# Patient Record
Sex: FEMALE | NOT HISPANIC OR LATINO | Employment: OTHER | URBAN - NONMETROPOLITAN AREA
[De-identification: names, ages, dates, MRNs, and addresses within clinical notes are randomized per-mention and may not be internally consistent; named-entity substitution may affect disease eponyms.]

---

## 2022-02-23 ENCOUNTER — HOSPITAL ENCOUNTER (EMERGENCY)
Facility: HOSPITAL | Age: 87
Discharge: HOME | End: 2022-02-24
Attending: EMERGENCY MEDICINE | Admitting: EMERGENCY MEDICINE
Payer: COMMERCIAL

## 2022-02-23 ENCOUNTER — APPOINTMENT (EMERGENCY)
Dept: CT IMAGING | Facility: HOSPITAL | Age: 87
End: 2022-02-23
Payer: COMMERCIAL

## 2022-02-23 ENCOUNTER — APPOINTMENT (EMERGENCY)
Dept: RADIOLOGY | Facility: HOSPITAL | Age: 87
End: 2022-02-23
Payer: COMMERCIAL

## 2022-02-23 DIAGNOSIS — R19.7 NAUSEA VOMITING AND DIARRHEA: ICD-10-CM

## 2022-02-23 DIAGNOSIS — R11.2 NAUSEA VOMITING AND DIARRHEA: ICD-10-CM

## 2022-02-23 DIAGNOSIS — J98.01 BRONCHOSPASM: Primary | ICD-10-CM

## 2022-02-23 DIAGNOSIS — J18.9 PNEUMONIA: ICD-10-CM

## 2022-02-23 LAB
2HR DELTA HS TROPONIN: 5 NG/L
ALBUMIN SERPL BCP-MCNC: 3.5 G/DL (ref 3.5–5)
ALP SERPL-CCNC: 86 U/L (ref 46–116)
ALT SERPL W P-5'-P-CCNC: 12 U/L (ref 12–78)
ANION GAP SERPL CALCULATED.3IONS-SCNC: 11 MMOL/L (ref 4–13)
APTT PPP: 28 SECONDS (ref 23–37)
AST SERPL W P-5'-P-CCNC: 16 U/L (ref 5–45)
BASE EX.OXY STD BLDV CALC-SCNC: 56.5 % (ref 60–80)
BASE EXCESS BLDV CALC-SCNC: -4.7 MMOL/L
BASOPHILS # BLD AUTO: 0.04 THOUSANDS/ΜL (ref 0–0.1)
BASOPHILS NFR BLD AUTO: 1 % (ref 0–1)
BILIRUB SERPL-MCNC: 0.64 MG/DL (ref 0.2–1)
BUN SERPL-MCNC: 43 MG/DL (ref 5–25)
CALCIUM SERPL-MCNC: 8.7 MG/DL (ref 8.3–10.1)
CARDIAC TROPONIN I PNL SERPL HS: 17 NG/L
CARDIAC TROPONIN I PNL SERPL HS: 22 NG/L
CHLORIDE SERPL-SCNC: 106 MMOL/L (ref 100–108)
CO2 SERPL-SCNC: 22 MMOL/L (ref 21–32)
CREAT SERPL-MCNC: 1.44 MG/DL (ref 0.6–1.3)
EOSINOPHIL # BLD AUTO: 0.85 THOUSAND/ΜL (ref 0–0.61)
EOSINOPHIL NFR BLD AUTO: 10 % (ref 0–6)
ERYTHROCYTE [DISTWIDTH] IN BLOOD BY AUTOMATED COUNT: 13.2 % (ref 11.6–15.1)
FLUAV RNA RESP QL NAA+PROBE: NEGATIVE
FLUBV RNA RESP QL NAA+PROBE: NEGATIVE
GFR SERPL CREATININE-BSD FRML MDRD: 33 ML/MIN/1.73SQ M
GLUCOSE SERPL-MCNC: 114 MG/DL (ref 65–140)
HCO3 BLDV-SCNC: 20.6 MMOL/L (ref 24–30)
HCT VFR BLD AUTO: 39.3 % (ref 34.8–46.1)
HGB BLD-MCNC: 13.4 G/DL (ref 11.5–15.4)
IMM GRANULOCYTES # BLD AUTO: 0.04 THOUSAND/UL (ref 0–0.2)
IMM GRANULOCYTES NFR BLD AUTO: 1 % (ref 0–2)
INR PPP: 1.18 (ref 0.84–1.19)
LACTATE SERPL-SCNC: 1.6 MMOL/L (ref 0.5–2)
LYMPHOCYTES # BLD AUTO: 1.79 THOUSANDS/ΜL (ref 0.6–4.47)
LYMPHOCYTES NFR BLD AUTO: 21 % (ref 14–44)
MAGNESIUM SERPL-MCNC: 2.1 MG/DL (ref 1.6–2.6)
MCH RBC QN AUTO: 33.9 PG (ref 26.8–34.3)
MCHC RBC AUTO-ENTMCNC: 34.1 G/DL (ref 31.4–37.4)
MCV RBC AUTO: 100 FL (ref 82–98)
MONOCYTES # BLD AUTO: 0.71 THOUSAND/ΜL (ref 0.17–1.22)
MONOCYTES NFR BLD AUTO: 8 % (ref 4–12)
NEUTROPHILS # BLD AUTO: 5.11 THOUSANDS/ΜL (ref 1.85–7.62)
NEUTS SEG NFR BLD AUTO: 59 % (ref 43–75)
NRBC BLD AUTO-RTO: 0 /100 WBCS
NT-PROBNP SERPL-MCNC: 501 PG/ML
O2 CT BLDV-SCNC: 11.2 ML/DL
PCO2 BLDV: 38.9 MM HG (ref 42–50)
PH BLDV: 7.34 [PH] (ref 7.3–7.4)
PLATELET # BLD AUTO: 196 THOUSANDS/UL (ref 149–390)
PMV BLD AUTO: 9.6 FL (ref 8.9–12.7)
PO2 BLDV: 29.7 MM HG (ref 35–45)
POTASSIUM SERPL-SCNC: 4.3 MMOL/L (ref 3.5–5.3)
PROT SERPL-MCNC: 7.3 G/DL (ref 6.4–8.2)
PROTHROMBIN TIME: 14.9 SECONDS (ref 11.6–14.5)
RBC # BLD AUTO: 3.95 MILLION/UL (ref 3.81–5.12)
RSV RNA RESP QL NAA+PROBE: NEGATIVE
SARS-COV-2 RNA RESP QL NAA+PROBE: NEGATIVE
SODIUM SERPL-SCNC: 139 MMOL/L (ref 136–145)
WBC # BLD AUTO: 8.54 THOUSAND/UL (ref 4.31–10.16)

## 2022-02-23 PROCEDURE — 85610 PROTHROMBIN TIME: CPT | Performed by: PHYSICIAN ASSISTANT

## 2022-02-23 PROCEDURE — 99285 EMERGENCY DEPT VISIT HI MDM: CPT

## 2022-02-23 PROCEDURE — 82805 BLOOD GASES W/O2 SATURATION: CPT | Performed by: PHYSICIAN ASSISTANT

## 2022-02-23 PROCEDURE — 0241U HB NFCT DS VIR RESP RNA 4 TRGT: CPT | Performed by: PHYSICIAN ASSISTANT

## 2022-02-23 PROCEDURE — 99284 EMERGENCY DEPT VISIT MOD MDM: CPT | Performed by: PHYSICIAN ASSISTANT

## 2022-02-23 PROCEDURE — 84484 ASSAY OF TROPONIN QUANT: CPT | Performed by: PHYSICIAN ASSISTANT

## 2022-02-23 PROCEDURE — 96360 HYDRATION IV INFUSION INIT: CPT

## 2022-02-23 PROCEDURE — 83880 ASSAY OF NATRIURETIC PEPTIDE: CPT | Performed by: PHYSICIAN ASSISTANT

## 2022-02-23 PROCEDURE — 71260 CT THORAX DX C+: CPT

## 2022-02-23 PROCEDURE — 83735 ASSAY OF MAGNESIUM: CPT | Performed by: PHYSICIAN ASSISTANT

## 2022-02-23 PROCEDURE — 71045 X-RAY EXAM CHEST 1 VIEW: CPT

## 2022-02-23 PROCEDURE — 93005 ELECTROCARDIOGRAM TRACING: CPT

## 2022-02-23 PROCEDURE — 83605 ASSAY OF LACTIC ACID: CPT | Performed by: PHYSICIAN ASSISTANT

## 2022-02-23 PROCEDURE — 87040 BLOOD CULTURE FOR BACTERIA: CPT | Performed by: PHYSICIAN ASSISTANT

## 2022-02-23 PROCEDURE — 80053 COMPREHEN METABOLIC PANEL: CPT | Performed by: PHYSICIAN ASSISTANT

## 2022-02-23 PROCEDURE — 85025 COMPLETE CBC W/AUTO DIFF WBC: CPT | Performed by: PHYSICIAN ASSISTANT

## 2022-02-23 PROCEDURE — 85730 THROMBOPLASTIN TIME PARTIAL: CPT | Performed by: PHYSICIAN ASSISTANT

## 2022-02-23 PROCEDURE — 36415 COLL VENOUS BLD VENIPUNCTURE: CPT | Performed by: PHYSICIAN ASSISTANT

## 2022-02-23 RX ORDER — ALBUTEROL SULFATE 90 UG/1
2 AEROSOL, METERED RESPIRATORY (INHALATION) EVERY 6 HOURS PRN
Qty: 8.5 G | Refills: 0 | Status: SHIPPED | OUTPATIENT
Start: 2022-02-23

## 2022-02-23 RX ORDER — DOXYCYCLINE HYCLATE 100 MG/1
100 CAPSULE ORAL EVERY 12 HOURS SCHEDULED
Qty: 14 CAPSULE | Refills: 0 | Status: SHIPPED | OUTPATIENT
Start: 2022-02-23 | End: 2022-03-02

## 2022-02-23 RX ORDER — LEVOFLOXACIN 5 MG/ML
750 INJECTION, SOLUTION INTRAVENOUS ONCE
Status: DISCONTINUED | OUTPATIENT
Start: 2022-02-23 | End: 2022-02-23

## 2022-02-23 RX ORDER — ONDANSETRON 4 MG/1
4 TABLET, ORALLY DISINTEGRATING ORAL EVERY 6 HOURS PRN
Qty: 20 TABLET | Refills: 0 | Status: SHIPPED | OUTPATIENT
Start: 2022-02-23

## 2022-02-23 RX ORDER — DOXYCYCLINE HYCLATE 100 MG/1
100 CAPSULE ORAL ONCE
Status: COMPLETED | OUTPATIENT
Start: 2022-02-23 | End: 2022-02-23

## 2022-02-23 RX ORDER — ALBUTEROL SULFATE 90 UG/1
2 AEROSOL, METERED RESPIRATORY (INHALATION) ONCE
Status: COMPLETED | OUTPATIENT
Start: 2022-02-23 | End: 2022-02-23

## 2022-02-23 RX ADMIN — DOXYCYCLINE 100 MG: 100 CAPSULE ORAL at 19:33

## 2022-02-23 RX ADMIN — SODIUM CHLORIDE 500 ML: 0.9 INJECTION, SOLUTION INTRAVENOUS at 18:52

## 2022-02-23 RX ADMIN — IOHEXOL 85 ML: 350 INJECTION, SOLUTION INTRAVENOUS at 18:29

## 2022-02-23 RX ADMIN — ALBUTEROL SULFATE 2 PUFF: 90 AEROSOL, METERED RESPIRATORY (INHALATION) at 19:36

## 2022-02-23 NOTE — ED PROVIDER NOTES
History  Chief Complaint   Patient presents with    Shortness of Breath     EMS found her saturation in the 70-80's stated possible aspiration because got short of breath after drinking some fluids  The patient is an 72-year-old female who presents emergency department today via EMS from her assisted living facility [facility] for the concern of shortness breath, cough, N/V/D  The patient states that last evening she "did not feel well " She had decreased appeptite and diarrhea  She began around 4 am having nausea and vomiting  She states that she had probably about 4 episodes of diarrhea, and 2-3 episodes of emesis last night into today  She states that these both ressolved  She states that she began having a dry persistant cough today, and at [facility], during a coughing bout, she had diaphoresis, paleness,SpO2 of 75% on RA  The aptient does not wear oxygen at home  The patient does admit to having the COVID vaccinations and booster vaccine  Patient denies any shortness of breath, chest pain, current nausea or vomiting or diarrhea, abdominal pain  Patient states that yesterday she did feel so she may have had a fever and chills but temperature was 97 9 per nursing staff  Per EMS staff when they arrived the patient was having a severe coughing bout the portable pulse ox was 75% on room air he she was placed on oxygen in route here     Patient takes elliquis due to history of PE      History provided by:  Patient and EMS personnel  Shortness of Breath  Severity:  Moderate  Onset quality:  Unable to specify  Timing:  Constant  Progression:  Resolved  Chronicity:  New  Context: URI    Relieved by:  Oxygen and rest  Worsened by:  Coughing  Ineffective treatments:  Rest  Associated symptoms: cough and vomiting    Associated symptoms: no abdominal pain, no chest pain, no ear pain, no fever, no rash and no sore throat    Cough:     Cough characteristics:  Non-productive    Sputum characteristics:  Nondescript Severity:  Moderate    Onset quality:  Gradual    Timing:  Constant    Progression:  Unchanged    Chronicity:  New  Vomiting:     Quality:  Undigested food and stomach contents    Severity:  Mild    Progression:  Resolved  Risk factors: obesity    Risk factors: no tobacco use        None       Past Medical History:   Diagnosis Date    Chronic pulmonary embolism (HCC)     Edema     Hyperlipidemia     Hypertension        History reviewed  No pertinent surgical history  History reviewed  No pertinent family history  I have reviewed and agree with the history as documented  E-Cigarette/Vaping     E-Cigarette/Vaping Substances     Social History     Tobacco Use    Smoking status: Former Smoker    Smokeless tobacco: Never Used   Substance Use Topics    Alcohol use: Never    Drug use: Never       Review of Systems   Constitutional: Negative for chills and fever  HENT: Negative for ear pain and sore throat  Eyes: Negative for pain and visual disturbance  Respiratory: Positive for cough and shortness of breath  Cardiovascular: Negative for chest pain and palpitations  Gastrointestinal: Positive for vomiting  Negative for abdominal pain  Genitourinary: Negative for dysuria and hematuria  Musculoskeletal: Negative for arthralgias and back pain  Skin: Negative for color change and rash  Neurological: Negative for seizures and syncope  All other systems reviewed and are negative  Physical Exam  Physical Exam  Vitals and nursing note reviewed  Constitutional:       General: She is not in acute distress  Appearance: She is well-developed  HENT:      Head: Normocephalic and atraumatic  Eyes:      Extraocular Movements: Extraocular movements intact  Conjunctiva/sclera: Conjunctivae normal       Pupils: Pupils are equal, round, and reactive to light  Cardiovascular:      Rate and Rhythm: Normal rate and regular rhythm  Heart sounds: No murmur heard        Pulmonary: Effort: Pulmonary effort is normal  No respiratory distress  Breath sounds: Rhonchi and rales present  Chest:      Chest wall: No tenderness  Abdominal:      Palpations: Abdomen is soft  Tenderness: There is no abdominal tenderness  Musculoskeletal:      Cervical back: Neck supple  Right lower leg: Edema present  Left lower leg: Edema present  Skin:     General: Skin is warm and dry  Capillary Refill: Capillary refill takes less than 2 seconds  Neurological:      General: No focal deficit present  Mental Status: She is alert and oriented to person, place, and time  Vital Signs  ED Triage Vitals [02/23/22 1722]   Temperature Pulse Respirations Blood Pressure SpO2   97 6 °F (36 4 °C) 72 22 139/63 95 %      Temp Source Heart Rate Source Patient Position - Orthostatic VS BP Location FiO2 (%)   Temporal Monitor Lying Right arm --      Pain Score       No Pain           Vitals:    02/23/22 1722 02/23/22 1810   BP: 139/63 103/57   Pulse: 72 68   Patient Position - Orthostatic VS: Lying          Visual Acuity      ED Medications  Medications   sodium chloride 0 9 % bolus 500 mL (0 mL Intravenous Stopped 2/23/22 1934)   iohexol (OMNIPAQUE) 350 MG/ML injection (SINGLE-DOSE) 85 mL (85 mL Intravenous Given 2/23/22 1829)   doxycycline hyclate (VIBRAMYCIN) capsule 100 mg (100 mg Oral Given 2/23/22 1933)   albuterol (PROVENTIL HFA,VENTOLIN HFA) inhaler 2 puff (2 puffs Inhalation Given 2/23/22 1936)       Diagnostic Studies  Results Reviewed     Procedure Component Value Units Date/Time    HS Troponin I 2hr [[id number]]  (Normal) Collected: 02/23/22 1901    Lab Status: Final result Specimen: Blood from Arm, Left Updated: 02/23/22 1928     hs TnI 2hr 22 ng/L      Delta 2hr hsTnI 5 ng/L     Blood culture #1 [[id number]] Collected: 02/23/22 1901    Lab Status:  In process Specimen: Blood from Arm, Left Updated: 02/23/22 1904    COVID/FLU/RSV - 2 hour TAT [[id number]]  (Normal) Collected: 02/23/22 1730    Lab Status: Final result Specimen: Nares from Nasopharyngeal Swab Updated: 02/23/22 1816     SARS-CoV-2 Negative     INFLUENZA A PCR Negative     INFLUENZA B PCR Negative     RSV PCR Negative    Narrative:      FOR PEDIATRIC PATIENTS - copy/paste COVID Guidelines URL to browser: [email or web address]    SARS-CoV-2 assay is a Nucleic Acid Amplification assay intended for the  qualitative detection of nucleic acid from SARS-CoV-2 in nasopharyngeal  swabs  Results are for the presumptive identification of SARS-CoV-2 RNA  Positive results are indicative of infection with SARS-CoV-2, the virus  causing COVID-19, but do not rule out bacterial infection or co-infection  with other viruses  Laboratories within the [place] and its  territories are required to report all positive results to the appropriate  public health authorities  Negative results do not preclude SARS-CoV-2  infection and should not be used as the sole basis for treatment or other  patient management decisions  Negative results must be combined with  clinical observations, patient history, and epidemiological information  This test has not been FDA cleared or approved  This test has been authorized by FDA under an Emergency Use Authorization  (EUA)  This test is only authorized for the duration of time the  declaration that circumstances exist justifying the authorization of the  emergency use of an in vitro diagnostic tests for detection of SARS-CoV-2  virus and/or diagnosis of COVID-19 infection under section 564(b)(1) of  the Act, 21 U  S C  185XTR-2(J)(3), unless the authorization is terminated  or revoked sooner  The test has been validated but independent review by FDA  and CLIA is pending  Test performed using [employer] Gene[employer]pert: This RT-PCR assay targets N2,  a region unique to SARS-CoV-2   A conserved region in the E-gene was chosen  for pan-Sarbecovirus detection which includes SARS-CoV-2  HS Troponin 0hr (reflex protocol) [[id number]]  (Normal) Collected: 02/23/22 1730    Lab Status: Final result Specimen: Blood from Arm, Left Updated: 02/23/22 1804     hs TnI 0hr 17 ng/L     Lactic acid [[id number]]  (Normal) Collected: 02/23/22 1730    Lab Status: Final result Specimen: Blood from Arm, Left Updated: 02/23/22 1804     LACTIC ACID 1 6 mmol/L     Narrative:      Result may be elevated if tourniquet was used during collection      Comprehensive metabolic panel [[id number]]  (Abnormal) Collected: 02/23/22 1730    Lab Status: Final result Specimen: Blood from Arm, Left Updated: 02/23/22 1801     Sodium 139 mmol/L      Potassium 4 3 mmol/L      Chloride 106 mmol/L      CO2 22 mmol/L      ANION GAP 11 mmol/L      BUN 43 mg/dL      Creatinine 1 44 mg/dL      Glucose 114 mg/dL      Calcium 8 7 mg/dL      AST 16 U/L      ALT 12 U/L      Alkaline Phosphatase 86 U/L      Total Protein 7 3 g/dL      Albumin 3 5 g/dL      Total Bilirubin 0 64 mg/dL      eGFR 33 ml/min/1 73sq m     Narrative:      [place] guidelines for Chronic Kidney Disease (CKD):     Stage 1 with normal or high GFR (GFR > 90 mL/min/1 73 square meters)    Stage 2 Mild CKD (GFR = 60-89 mL/min/1 73 square meters)    Stage 3A Moderate CKD (GFR = 45-59 mL/min/1 73 square meters)    Stage 3B Moderate CKD (GFR = 30-44 mL/min/1 73 square meters)    Stage 4 Severe CKD (GFR = 15-29 mL/min/1 73 square meters)    Stage 5 End Stage CKD (GFR <15 mL/min/1 73 square meters)  Note: GFR calculation is accurate only with a steady state creatinine    Magnesium [[id number]]  (Normal) Collected: 02/23/22 1730    Lab Status: Final result Specimen: Blood from Arm, Left Updated: 02/23/22 1801     Magnesium 2 1 mg/dL     NT-BNP PRO [[id number]]  (Abnormal) Collected: 02/23/22 1730    Lab Status: Final result Specimen: Blood from Arm, Left Updated: 02/23/22 1801     NT-proBNP 501 pg/mL     APTT [[id number]] (Normal) Collected: 02/23/22 1729    Lab Status: Final result Specimen: Blood from Arm, Left Updated: 02/23/22 1751     PTT 28 seconds     Protime-INR [[id number]]  (Abnormal) Collected: 02/23/22 1729    Lab Status: Final result Specimen: Blood from Arm, Left Updated: 02/23/22 1751     Protime 14 9 seconds      INR 1 18    Blood gas, venous [[id number]]  (Abnormal) Collected: 02/23/22 1729    Lab Status: Final result Specimen: Blood from Arm, Left Updated: 02/23/22 1738     pH, Abraham 7 341     pCO2, Abraham 38 9 mm Hg      pO2, Abraham 29 7 mm Hg      HCO3, Abraham 20 6 mmol/L      Base Excess, Abraham -4 7 mmol/L      O2 Content, Abraham 11 2 ml/dL      O2 HGB, VENOUS 56 5 %     CBC and differential [[id number]]  (Abnormal) Collected: 02/23/22 1730    Lab Status: Final result Specimen: Blood from Arm, Left Updated: 02/23/22 1737     WBC 8 54 Thousand/uL      RBC 3 95 Million/uL      Hemoglobin 13 4 g/dL      Hematocrit 39 3 %       fL      MCH 33 9 pg      MCHC 34 1 g/dL      RDW 13 2 %      MPV 9 6 fL      Platelets 617 Thousands/uL      nRBC 0 /100 WBCs      Neutrophils Relative 59 %      Immat GRANS % 1 %      Lymphocytes Relative 21 %      Monocytes Relative 8 %      Eosinophils Relative 10 %      Basophils Relative 1 %      Neutrophils Absolute 5 11 Thousands/µL      Immature Grans Absolute 0 04 Thousand/uL      Lymphocytes Absolute 1 79 Thousands/µL      Monocytes Absolute 0 71 Thousand/µL      Eosinophils Absolute 0 85 Thousand/µL      Basophils Absolute 0 04 Thousands/µL     Blood culture #2 [[id number]] Collected: 02/23/22 1730    Lab Status: In process Specimen: Blood from Arm, Left Updated: 02/23/22 1736                 CT chest with contrast   Final Result by Jose Mckenna MD (02/23 1851)      Patchy and linear opacities in bilateral upper (left greater than right) and left lower lobes  Findings may represent infection, inflammation, or aspiration  Consider follow-up chest CT in 8-12 weeks        The study was marked in EPIC for immediate notification  Workstation performed: [id number]         XR chest 1 view portable   Final Result by Karla Keller MD (02/23 1853)      Patchy linear opacities scattered throughout left lung  Differential includes infection, inflammation, or aspiration  Workstation performed: [id number]                    Procedures  ECG 12 Lead Documentation Only    Date/Time: 2/23/2022 6:11 PM  Performed by: Emilio Crum PA-C  Authorized by: Emilio Crum PA-C     Indications / Diagnosis:  Nausea  Patient location:  ED  Previous ECG:     Previous ECG:  Unavailable  Interpretation:     Interpretation: non-specific    Rate:     ECG rate:  71    ECG rate assessment: normal    Rhythm:     Rhythm: sinus rhythm    Conduction:     Conduction: abnormal      Abnormal conduction: complete RBBB               ED Course  ED Course as of 02/23/22 2306   Wed Feb 23, 2022   1802 The patient upon arrival is 95% on room air with a good wave form, will continue to monitor   1806 hs TnI 0hr: 17   1829 SARS-COV-2: Negative   1904 Patchy and linear opacities in bilateral upper (left greater than right) and left lower lobes  Findings may represent infection, inflammation, or aspiration  Consider follow-up chest CT in 8-12 weeks      The study was marked in EPIC for immediate notification  1928 Delta 2hr hsTnI: 5                               SBIRT 22yo+      Most Recent Value   SBIRT (22 yo +)    In order to provide better care to our patients, we are screening all of our patients for alcohol and drug use  Would it be okay to ask you these screening questions? Yes Filed at: 02/23/2022 1851   Initial Alcohol Screen: US AUDIT-C     1  How often do you have a drink containing alcohol? 0 Filed at: 02/23/2022 1851   2  How many drinks containing alcohol do you have on a typical day you are drinking? 0 Filed at: 02/23/2022 1851   3a  Male UNDER 65:  How often do you have five or more drinks on one occasion? 0 Filed at: 02/23/2022 1851   3b  FEMALE Any Age, or MALE 65+: How often do you have 4 or more drinks on one occassion? 0 Filed at: 02/23/2022 1851   Audit-C Score 0 Filed at: 02/23/2022 1851   DEMETRIS: How many times in the past year have you    Used an illegal drug or used a prescription medication for non-medical reasons? Never Filed at: 02/23/2022 1851                    MDM  Number of Diagnoses or Management Options  Bronchospasm  Nausea vomiting and diarrhea  Pneumonia  Diagnosis management comments: The patient upon arrival did not require any supplemental oxygen and remained 96%-98% on room air throughout her entire ER evaluation  Patient did not have any fever any leukocytosis or lactic acidosis  Patient on CT scan illustrated inflammation versus infection in the upper lobes  Patient however was on room air and doing well and feeling well  Patient's condition earlier with her abnormal pulse ox could be due to an inaccurate read secondary to bronchospasm or movement, this could been due to an acute bronchospasm which resolved  Patient will be empirically started on antibiotics  Discussed case with the patient at this time, she feels well would like to return back to the assisted living facility to complete outpatient antibiotics and treatment will return if anything worsens         Amount and/or Complexity of Data Reviewed  Clinical lab tests: ordered and reviewed  Tests in the radiology section of CPT®: ordered and reviewed  Decide to obtain previous medical records or to obtain history from someone other than the patient: yes  Review and summarize past medical records: yes  Independent visualization of images, tracings, or specimens: yes    Risk of Complications, Morbidity, and/or Mortality  Presenting problems: moderate  Diagnostic procedures: moderate  Management options: moderate    Patient Progress  Patient progress: stable      Disposition  Final diagnoses: Bronchospasm   Nausea vomiting and diarrhea   Pneumonia     Time reflects when diagnosis was documented in both MDM as applicable and the Disposition within this note     Time User Action Codes Description Comment    2/23/2022  7:31 PM Fraser Lundborg Add [J98 01] Bronchospasm     2/23/2022  7:31 PM Fraser Lundborg Add [R11 2,  R19 7] Nausea vomiting and diarrhea     2/23/2022  7:32 PM Bry Izquierdog Add [J18 9] Pneumonia       ED Disposition     ED Disposition Condition Date/Time Comment    Discharge Stable Wed Feb 23, 2022  7:32 PM Vandana Payor discharge to home/self care  Follow-up Information    None         Patient's Medications   Discharge Prescriptions    ALBUTEROL (PROAIR HFA) 90 MCG/ACT INHALER    Inhale 2 puffs every 6 (six) hours as needed for wheezing       Start Date: 2/23/2022 End Date: --       Order Dose: 2 puffs       Quantity: 8 5 g    Refills: 0    DOXYCYCLINE HYCLATE (VIBRAMYCIN) 100 MG CAPSULE    Take 1 capsule (100 mg total) by mouth every 12 (twelve) hours for 7 days       Start Date: 2/23/2022 End Date: 3/2/2022       Order Dose: 100 mg       Quantity: 14 capsule    Refills: 0    ONDANSETRON (ZOFRAN ODT) 4 MG DISINTEGRATING TABLET    Take 1 tablet (4 mg total) by mouth every 6 (six) hours as needed for nausea or vomiting       Start Date: 2/23/2022 End Date: --       Order Dose: 4 mg       Quantity: 20 tablet    Refills: 0       No discharge procedures on file      PDMP Review     None          ED Provider  Electronically Signed by           Gerry Peterson PA-C  02/23/22 1808

## 2022-02-24 VITALS
WEIGHT: 224.87 LBS | OXYGEN SATURATION: 93 % | DIASTOLIC BLOOD PRESSURE: 65 MMHG | RESPIRATION RATE: 18 BRPM | SYSTOLIC BLOOD PRESSURE: 152 MMHG | TEMPERATURE: 97.6 F | HEART RATE: 67 BPM

## 2022-02-24 LAB
ATRIAL RATE: 71 BPM
P AXIS: 54 DEGREES
PR INTERVAL: 164 MS
QRS AXIS: -2 DEGREES
QRSD INTERVAL: 118 MS
QT INTERVAL: 420 MS
QTC INTERVAL: 456 MS
T WAVE AXIS: 22 DEGREES
VENTRICULAR RATE: 71 BPM

## 2022-02-24 RX ORDER — BACITRACIN 500 [USP'U]/G
OINTMENT TOPICAL 2 TIMES DAILY
COMMUNITY

## 2022-02-24 RX ORDER — OXYBUTYNIN CHLORIDE 10 MG/1
10 TABLET, EXTENDED RELEASE ORAL
COMMUNITY

## 2022-02-24 RX ORDER — FUROSEMIDE 40 MG/1
40 TABLET ORAL DAILY PRN
COMMUNITY

## 2022-02-24 RX ORDER — PRAVASTATIN SODIUM 40 MG
40 TABLET ORAL DAILY
COMMUNITY

## 2022-02-24 RX ORDER — LISINOPRIL 20 MG/1
20 TABLET ORAL DAILY
COMMUNITY

## 2022-02-24 NOTE — ED NOTES
Pt 1 assist to bedside toilet  Pt 2 assist back into bed  Pt given ice water        Lore Gomez RN  02/23/22 5985

## 2022-02-24 NOTE — ED NOTES
[facility] to transport patient back to [address] at 1200       Girish Garrett RN  02/24/22 5627

## 2022-02-24 NOTE — ED ATTENDING ATTESTATION
2/23/2022  Gallo Guzman DO, saw and evaluated the patient  I have discussed the patient with the resident/non-physician practitioner and agree with the resident's/non-physician practitioner's findings, Plan of Care, and MDM as documented in the resident's/non-physician practitioner's note, except where noted  All available labs and Radiology studies were reviewed  I was present for key portions of any procedure(s) performed by the resident/non-physician practitioner and I was immediately available to provide assistance  At this point I agree with the current assessment done in the Emergency Department  I have conducted an independent evaluation of this patient a history and physical     ED Course  ED Course as of 02/23/22 2125 Wed Feb 23, 2022   1740 EKG 5:31 p m   Normal sinus rhythm rate 71 normal axis intraventricular conduction delay T-wave inversion V1 lead 3 no ST elevation or depression interpreted by me

## 2022-02-24 NOTE — DISCHARGE INSTRUCTIONS
On our CT of your chest you were found to have some " Patchy and linear opacities in bilateral upper (left greater than right) and left lower lobes  Findings may represent infection, inflammation, or aspiration  Consider follow-up chest CT in 8-12 weeks "    We have started you on antibiotic if anything were to worsen please return  It also started inhaler please use 1-2 puffs every 4-6 hours as needed for cough or shortness of breath  You may have had a bronchospasm that resolved, you did not need any supplemental oxygen in the emergency department

## 2022-02-24 NOTE — ED NOTES
[facility] contacted again, state no BLS transportation available tonight, will try again on day shift        Daron Vera RN  02/24/22 6621

## 2022-03-01 LAB
BACTERIA BLD CULT: NORMAL
BACTERIA BLD CULT: NORMAL

## 2022-08-31 ENCOUNTER — HOSPITAL ENCOUNTER (EMERGENCY)
Facility: HOSPITAL | Age: 87
Discharge: HOME | End: 2022-09-01
Attending: STUDENT IN AN ORGANIZED HEALTH CARE EDUCATION/TRAINING PROGRAM
Payer: COMMERCIAL

## 2022-08-31 DIAGNOSIS — R89.9 ABNORMAL LABORATORY TEST: Primary | ICD-10-CM

## 2022-08-31 LAB
ANION GAP SERPL CALCULATED.3IONS-SCNC: 9 MMOL/L (ref 4–13)
ATRIAL RATE: 71 BPM
BUN SERPL-MCNC: 34 MG/DL (ref 5–25)
CALCIUM SERPL-MCNC: 8.4 MG/DL (ref 8.3–10.1)
CHLORIDE SERPL-SCNC: 105 MMOL/L (ref 96–108)
CO2 SERPL-SCNC: 23 MMOL/L (ref 21–32)
CREAT SERPL-MCNC: 1.44 MG/DL (ref 0.6–1.3)
GFR SERPL CREATININE-BSD FRML MDRD: 33 ML/MIN/1.73SQ M
GLUCOSE SERPL-MCNC: 117 MG/DL (ref 65–140)
MAGNESIUM SERPL-MCNC: 2.1 MG/DL (ref 1.6–2.6)
P AXIS: 17 DEGREES
POTASSIUM SERPL-SCNC: 4.7 MMOL/L (ref 3.5–5.3)
PR INTERVAL: 150 MS
QRS AXIS: -35 DEGREES
QRSD INTERVAL: 122 MS
QT INTERVAL: 416 MS
QTC INTERVAL: 452 MS
SODIUM SERPL-SCNC: 137 MMOL/L (ref 135–147)
T WAVE AXIS: 14 DEGREES
VENTRICULAR RATE: 71 BPM

## 2022-08-31 PROCEDURE — 93005 ELECTROCARDIOGRAM TRACING: CPT

## 2022-08-31 PROCEDURE — 83735 ASSAY OF MAGNESIUM: CPT | Performed by: STUDENT IN AN ORGANIZED HEALTH CARE EDUCATION/TRAINING PROGRAM

## 2022-08-31 PROCEDURE — 99284 EMERGENCY DEPT VISIT MOD MDM: CPT

## 2022-08-31 PROCEDURE — 80048 BASIC METABOLIC PNL TOTAL CA: CPT | Performed by: STUDENT IN AN ORGANIZED HEALTH CARE EDUCATION/TRAINING PROGRAM

## 2022-08-31 PROCEDURE — 36415 COLL VENOUS BLD VENIPUNCTURE: CPT | Performed by: STUDENT IN AN ORGANIZED HEALTH CARE EDUCATION/TRAINING PROGRAM

## 2022-08-31 PROCEDURE — 99282 EMERGENCY DEPT VISIT SF MDM: CPT | Performed by: STUDENT IN AN ORGANIZED HEALTH CARE EDUCATION/TRAINING PROGRAM

## 2022-09-01 VITALS
WEIGHT: 235.01 LBS | TEMPERATURE: 98 F | RESPIRATION RATE: 20 BRPM | HEART RATE: 68 BPM | OXYGEN SATURATION: 98 % | HEIGHT: 65 IN | BODY MASS INDEX: 39.16 KG/M2 | DIASTOLIC BLOOD PRESSURE: 62 MMHG | SYSTOLIC BLOOD PRESSURE: 110 MMHG

## 2022-09-01 NOTE — ED NOTES
[facility] called for transportation, will call back with transport information  Patient made aware and advised transport could take some time  Patient advised they would be placed in inpatient bed if they needed to stay over night waiting for ride  Patient agreeable        Trista Rodriguez RN  08/31/22 3506

## 2022-09-01 NOTE — DISCHARGE INSTRUCTIONS
Continue all prescribed medications  Follow up with the PCP  Do not hesitate to return to the ED for any concerning signs or symptoms

## 2022-09-01 NOTE — ED PROVIDER NOTES
History  Chief Complaint   Patient presents with    Abnormal Lab     Pt sent from [facility] AND  [facility] [place]  Hx hyperkalemia  K 6 1 on OP lab draw today  HPI     82year old F  Presents to the ED with hyperkalemia  OP labs obtained today was significant for a K 6 1  Has been eating and drinking well over the past few days  Voiding normally  Prescribed PO Lasix 40 mg prn for weight gain  Denies chest pain and SOB  Prior to Admission Medications   Prescriptions Last Dose Informant Patient Reported? Taking? Pseudoephedrine-guaiFENesin (GUAIFENESIN/PSE SA PO)   Yes No   Sig: Take 5 mL by mouth every 6 (six) hours as needed   albuterol (ProAir HFA) 90 mcg/act inhaler   No No   Sig: Inhale 2 puffs every 6 (six) hours as needed for wheezing   apixaban (Eliquis) 2 5 mg   Yes No   Sig: Take by mouth 2 (two) times a day   furosemide (LASIX) 40 mg tablet   Yes No   Sig: Take 40 mg by mouth daily as needed   lisinopril (ZESTRIL) 20 mg tablet   Yes No   Sig: Take 20 mg by mouth daily   ondansetron (Zofran ODT) 4 mg disintegrating tablet   No No   Sig: Take 1 tablet (4 mg total) by mouth every 6 (six) hours as needed for nausea or vomiting   oxybutynin (DITROPAN-XL) 10 MG 24 hr tablet   Yes No   Sig: Take 10 mg by mouth daily at bedtime   pravastatin (PRAVACHOL) 40 mg tablet   Yes No   Sig: Take 40 mg by mouth daily   timolol (BETIMOL) 0 5 % ophthalmic solution   Yes No   Sig: Administer 1 drop into the left eye daily at bedtime     zinc oxide 20 % ointment   Yes No   Sig: Apply topically 2 (two) times a day      Facility-Administered Medications: None       Past Medical History:   Diagnosis Date    Chronic pulmonary embolism (HCC)     COPD (chronic obstructive pulmonary disease) (HCC)     Edema     Hyperlipidemia     Hypertension        History reviewed  No pertinent surgical history  History reviewed  No pertinent family history  I have reviewed and agree with the history as documented      E-Cigarette/Vaping E-Cigarette/Vaping Substances     Social History     Tobacco Use    Smoking status: Former Smoker    Smokeless tobacco: Never Used   Substance Use Topics    Alcohol use: Never    Drug use: Never       Review of Systems   Constitutional: Negative for activity change, appetite change, chills, diaphoresis and fever  HENT: Negative for congestion, rhinorrhea, sinus pressure, sinus pain and sore throat  Eyes: Negative for pain, discharge, itching and visual disturbance  Respiratory: Negative for cough, chest tightness, shortness of breath and wheezing  Cardiovascular: Negative for chest pain, palpitations and leg swelling  Gastrointestinal: Negative for abdominal pain, constipation, diarrhea, nausea and vomiting  Genitourinary: Negative for decreased urine volume, difficulty urinating, dysuria, flank pain, frequency and urgency  Musculoskeletal: Negative for back pain, myalgias, neck pain and neck stiffness  Skin: Negative for color change, pallor, rash and wound  Neurological: Negative for dizziness, syncope, weakness, light-headedness, numbness and headaches  Hematological: Does not bruise/bleed easily  Psychiatric/Behavioral: Negative for confusion and sleep disturbance  All other systems reviewed and are negative  Physical Exam  Physical Exam  Vitals and nursing note reviewed  Constitutional:       General: She is not in acute distress  Appearance: She is not ill-appearing or toxic-appearing  HENT:      Head: Normocephalic and atraumatic  Right Ear: External ear normal       Left Ear: External ear normal       Nose: No congestion or rhinorrhea  Mouth/Throat:      Mouth: Mucous membranes are moist       Pharynx: Oropharynx is clear  No oropharyngeal exudate or posterior oropharyngeal erythema  Eyes:      General:         Right eye: No discharge  Left eye: No discharge  Extraocular Movements: Extraocular movements intact        Conjunctiva/sclera: Conjunctivae normal    Cardiovascular:      Rate and Rhythm: Normal rate and regular rhythm  Pulses: Normal pulses  Heart sounds: Normal heart sounds  No murmur heard  Pulmonary:      Effort: Pulmonary effort is normal  No respiratory distress  Breath sounds: Normal breath sounds  No stridor  No wheezing, rhonchi or rales  Chest:      Chest wall: No tenderness  Abdominal:      General: Abdomen is flat  Bowel sounds are normal  There is no distension  Palpations: Abdomen is soft  There is no mass  Tenderness: There is no abdominal tenderness  There is no right CVA tenderness, left CVA tenderness, guarding or rebound  Hernia: No hernia is present  Musculoskeletal:         General: No swelling, tenderness, deformity or signs of injury  Cervical back: Neck supple  No tenderness  Right lower leg: Edema present  Left lower leg: Edema present  Skin:     General: Skin is warm and dry  Capillary Refill: Capillary refill takes less than 2 seconds  Coloration: Skin is not jaundiced or pale  Findings: No bruising, erythema or rash  Neurological:      General: No focal deficit present  Mental Status: She is alert and oriented to person, place, and time  Cranial Nerves: No cranial nerve deficit  Sensory: No sensory deficit  Motor: No weakness  Psychiatric:         Mood and Affect: Mood normal          Behavior: Behavior normal          Thought Content:  Thought content normal          Judgment: Judgment normal          Vital Signs  ED Triage Vitals [08/31/22 2032]   Temperature Pulse Respirations Blood Pressure SpO2   98 °F (36 7 °C) 76 16 (!) 171/129 98 %      Temp src Heart Rate Source Patient Position - Orthostatic VS BP Location FiO2 (%)   -- -- Lying Right arm --      Pain Score       No Pain         Vitals:    08/31/22 2032   BP: (!) 171/129   Pulse: 76   Patient Position - Orthostatic VS: Lying     ED Medications  Medications - No data to display    Diagnostic Studies  Results Reviewed     Procedure Component Value Units Date/Time    Basic metabolic panel [[id number]]  (Abnormal) Collected: 08/31/22 2100    Lab Status: Final result Specimen: Blood from Arm, Left Updated: 08/31/22 2115     Sodium 137 mmol/L      Potassium 4 7 mmol/L      Chloride 105 mmol/L      CO2 23 mmol/L      ANION GAP 9 mmol/L      BUN 34 mg/dL      Creatinine 1 44 mg/dL      Glucose 117 mg/dL      Calcium 8 4 mg/dL      eGFR 33 ml/min/1 73sq m     Narrative:      [place] guidelines for Chronic Kidney Disease (CKD):     Stage 1 with normal or high GFR (GFR > 90 mL/min/1 73 square meters)    Stage 2 Mild CKD (GFR = 60-89 mL/min/1 73 square meters)    Stage 3A Moderate CKD (GFR = 45-59 mL/min/1 73 square meters)    Stage 3B Moderate CKD (GFR = 30-44 mL/min/1 73 square meters)    Stage 4 Severe CKD (GFR = 15-29 mL/min/1 73 square meters)    Stage 5 End Stage CKD (GFR <15 mL/min/1 73 square meters)  Note: GFR calculation is accurate only with a steady state creatinine    Magnesium [[id number]]  (Normal) Collected: 08/31/22 2041    Lab Status: Final result Specimen: Blood from Arm, Left Updated: 08/31/22 2056     Magnesium 2 1 mg/dL              No orders to display          Procedures  Procedures     ED Course  ED Course as of 09/01/22 0646   Wed Aug 31, 2022   2124 Repeat potassium is within normal limits  Renal function is baseline  SBIRT 22yo+    Flowsheet Row Most Recent Value   SBIRT (25 yo +)    In order to provide better care to our patients, we are screening all of our patients for alcohol and drug use  Would it be okay to ask you these screening questions? Yes Filed at: 08/31/2022 2039   Initial Alcohol Screen: US AUDIT-C     1  How often do you have a drink containing alcohol? 0 Filed at: 08/31/2022 2039   2  How many drinks containing alcohol do you have on a typical day you are drinking? 0 Filed at: 08/31/2022 2039   3a   Male UNDER 65: How often do you have five or more drinks on one occasion? 0 Filed at: 08/31/2022 2039   3b  FEMALE Any Age, or MALE 65+: How often do you have 4 or more drinks on one occassion? 0 Filed at: 08/31/2022 2039   Audit-C Score 0 Filed at: 08/31/2022 2039   DEMETRIS: How many times in the past year have you    Used an illegal drug or used a prescription medication for non-medical reasons? Never Filed at: 08/31/2022 2039        MDM    Disposition  Final diagnoses:   Abnormal laboratory test     Time reflects when diagnosis was documented in both MDM as applicable and the Disposition within this note     Time User Action Codes Description Comment    8/31/2022  9:25 PM Luzma Delgado Add [R89 9] Abnormal laboratory test       ED Disposition     ED Disposition   Discharge    Condition   Stable    Date/Time   Wed Aug 31, 2022  9:24 PM    Comment   Yury Navarrete discharge to home/self care  Follow-up Information    None         Patient's Medications   Discharge Prescriptions    No medications on file       No discharge procedures on file      PDMP Review     None          ED Provider  Electronically Signed by           Hari Gunn DO  09/02/22 1524

## 2022-09-06 LAB
ATRIAL RATE: 71 BPM
P AXIS: 17 DEGREES
PR INTERVAL: 150 MS
QRS AXIS: -35 DEGREES
QRSD INTERVAL: 122 MS
QT INTERVAL: 416 MS
QTC INTERVAL: 452 MS
T WAVE AXIS: 14 DEGREES
VENTRICULAR RATE: 71 BPM

## 2022-09-06 PROCEDURE — 93010 ELECTROCARDIOGRAM REPORT: CPT | Performed by: INTERNAL MEDICINE

## 2023-02-09 ENCOUNTER — HOSPITAL ENCOUNTER (OUTPATIENT)
Dept: CT IMAGING | Facility: HOSPITAL | Age: 88
Discharge: HOME | End: 2023-02-09

## 2023-02-09 DIAGNOSIS — R91.8 LUNG MASS: ICD-10-CM

## 2023-11-20 ENCOUNTER — HOSPITAL ENCOUNTER (OUTPATIENT)
Dept: CT IMAGING | Facility: HOSPITAL | Age: 88
Discharge: HOME | End: 2023-11-20
Payer: COMMERCIAL

## 2023-11-20 DIAGNOSIS — R91.8 OTHER NONSPECIFIC ABNORMAL FINDING OF LUNG FIELD: ICD-10-CM

## 2023-11-20 PROCEDURE — 71250 CT THORAX DX C-: CPT

## 2024-01-11 ENCOUNTER — HOSPITAL ENCOUNTER (INPATIENT)
Facility: HOSPITAL | Age: 89
LOS: 2 days | Discharge: HOME HEALTH | DRG: 683 | End: 2024-01-13
Attending: EMERGENCY MEDICINE | Admitting: FAMILY MEDICINE
Payer: COMMERCIAL

## 2024-01-11 ENCOUNTER — APPOINTMENT (INPATIENT)
Dept: ULTRASOUND IMAGING | Facility: HOSPITAL | Age: 89
DRG: 683 | End: 2024-01-11
Payer: COMMERCIAL

## 2024-01-11 DIAGNOSIS — N17.9 AKI (ACUTE KIDNEY INJURY) (HCC): Primary | ICD-10-CM

## 2024-01-11 DIAGNOSIS — N17.9 ACUTE KIDNEY INJURY SUPERIMPOSED ON CHRONIC KIDNEY DISEASE: ICD-10-CM

## 2024-01-11 DIAGNOSIS — N18.9 ACUTE KIDNEY INJURY SUPERIMPOSED ON CHRONIC KIDNEY DISEASE: ICD-10-CM

## 2024-01-11 DIAGNOSIS — I89.0 LYMPHEDEMA: ICD-10-CM

## 2024-01-11 PROBLEM — I82.409 DVT (DEEP VENOUS THROMBOSIS) (HCC): Status: ACTIVE | Noted: 2024-01-11

## 2024-01-11 PROBLEM — J84.9 ILD (INTERSTITIAL LUNG DISEASE) (HCC): Status: ACTIVE | Noted: 2024-01-11

## 2024-01-11 PROBLEM — I10 HTN (HYPERTENSION): Status: ACTIVE | Noted: 2024-01-11

## 2024-01-11 LAB
ALBUMIN SERPL BCP-MCNC: 3.9 G/DL (ref 3.5–5)
ALP SERPL-CCNC: 56 U/L (ref 34–104)
ALT SERPL W P-5'-P-CCNC: 11 U/L (ref 7–52)
ANION GAP SERPL CALCULATED.3IONS-SCNC: 10 MMOL/L
AST SERPL W P-5'-P-CCNC: 24 U/L (ref 13–39)
BASOPHILS # BLD AUTO: 0.06 THOUSANDS/ÂΜL (ref 0–0.1)
BASOPHILS NFR BLD AUTO: 1 % (ref 0–1)
BILIRUB SERPL-MCNC: 0.54 MG/DL (ref 0.2–1)
BUN SERPL-MCNC: 76 MG/DL (ref 5–25)
CALCIUM SERPL-MCNC: 8.9 MG/DL (ref 8.4–10.2)
CHLORIDE SERPL-SCNC: 106 MMOL/L (ref 96–108)
CO2 SERPL-SCNC: 20 MMOL/L (ref 21–32)
CREAT SERPL-MCNC: 2.33 MG/DL (ref 0.6–1.3)
EOSINOPHIL # BLD AUTO: 0.41 THOUSAND/ÂΜL (ref 0–0.61)
EOSINOPHIL NFR BLD AUTO: 4 % (ref 0–6)
ERYTHROCYTE [DISTWIDTH] IN BLOOD BY AUTOMATED COUNT: 13 % (ref 11.6–15.1)
GFR SERPL CREATININE-BSD FRML MDRD: 18 ML/MIN/1.73SQ M
GLUCOSE SERPL-MCNC: 118 MG/DL (ref 65–140)
HCT VFR BLD AUTO: 39.7 % (ref 34.8–46.1)
HGB BLD-MCNC: 12.8 G/DL (ref 11.5–15.4)
IMM GRANULOCYTES # BLD AUTO: 0.05 THOUSAND/UL (ref 0–0.2)
IMM GRANULOCYTES NFR BLD AUTO: 1 % (ref 0–2)
LYMPHOCYTES # BLD AUTO: 2.19 THOUSANDS/ÂΜL (ref 0.6–4.47)
LYMPHOCYTES NFR BLD AUTO: 24 % (ref 14–44)
MCH RBC QN AUTO: 33.3 PG (ref 26.8–34.3)
MCHC RBC AUTO-ENTMCNC: 32.2 G/DL (ref 31.4–37.4)
MCV RBC AUTO: 103 FL (ref 82–98)
MONOCYTES # BLD AUTO: 0.71 THOUSAND/ÂΜL (ref 0.17–1.22)
MONOCYTES NFR BLD AUTO: 8 % (ref 4–12)
NEUTROPHILS # BLD AUTO: 5.82 THOUSANDS/ÂΜL (ref 1.85–7.62)
NEUTS SEG NFR BLD AUTO: 62 % (ref 43–75)
NRBC BLD AUTO-RTO: 0 /100 WBCS
PLATELET # BLD AUTO: 239 THOUSANDS/UL (ref 149–390)
PMV BLD AUTO: 10 FL (ref 8.9–12.7)
POTASSIUM SERPL-SCNC: 5.2 MMOL/L (ref 3.5–5.3)
POTASSIUM SERPL-SCNC: 5.7 MMOL/L (ref 3.5–5.3)
POTASSIUM SERPL-SCNC: 5.9 MMOL/L (ref 3.5–5.3)
PROT SERPL-MCNC: 7.5 G/DL (ref 6.4–8.4)
RBC # BLD AUTO: 3.84 MILLION/UL (ref 3.81–5.12)
SODIUM SERPL-SCNC: 136 MMOL/L (ref 135–147)
WBC # BLD AUTO: 9.24 THOUSAND/UL (ref 4.31–10.16)

## 2024-01-11 PROCEDURE — 80053 COMPREHEN METABOLIC PANEL: CPT | Performed by: EMERGENCY MEDICINE

## 2024-01-11 PROCEDURE — 96361 HYDRATE IV INFUSION ADD-ON: CPT

## 2024-01-11 PROCEDURE — 76775 US EXAM ABDO BACK WALL LIM: CPT

## 2024-01-11 PROCEDURE — 36415 COLL VENOUS BLD VENIPUNCTURE: CPT | Performed by: EMERGENCY MEDICINE

## 2024-01-11 PROCEDURE — 96360 HYDRATION IV INFUSION INIT: CPT

## 2024-01-11 PROCEDURE — 99223 1ST HOSP IP/OBS HIGH 75: CPT | Performed by: FAMILY MEDICINE

## 2024-01-11 PROCEDURE — 99285 EMERGENCY DEPT VISIT HI MDM: CPT

## 2024-01-11 PROCEDURE — 99285 EMERGENCY DEPT VISIT HI MDM: CPT | Performed by: EMERGENCY MEDICINE

## 2024-01-11 PROCEDURE — 85025 COMPLETE CBC W/AUTO DIFF WBC: CPT | Performed by: EMERGENCY MEDICINE

## 2024-01-11 PROCEDURE — 84132 ASSAY OF SERUM POTASSIUM: CPT | Performed by: EMERGENCY MEDICINE

## 2024-01-11 RX ORDER — SODIUM CHLORIDE 9 MG/ML
75 INJECTION, SOLUTION INTRAVENOUS CONTINUOUS
Status: DISCONTINUED | OUTPATIENT
Start: 2024-01-11 | End: 2024-01-13

## 2024-01-11 RX ORDER — ACETAMINOPHEN 325 MG/1
650 TABLET ORAL EVERY 6 HOURS PRN
Status: DISCONTINUED | OUTPATIENT
Start: 2024-01-11 | End: 2024-01-13 | Stop reason: HOSPADM

## 2024-01-11 RX ORDER — ALBUTEROL SULFATE 90 UG/1
2 AEROSOL, METERED RESPIRATORY (INHALATION) EVERY 6 HOURS PRN
Status: DISCONTINUED | OUTPATIENT
Start: 2024-01-11 | End: 2024-01-13 | Stop reason: HOSPADM

## 2024-01-11 RX ORDER — TIMOLOL MALEATE 5 MG/ML
1 SOLUTION/ DROPS OPHTHALMIC DAILY
Status: DISCONTINUED | OUTPATIENT
Start: 2024-01-12 | End: 2024-01-13 | Stop reason: HOSPADM

## 2024-01-11 RX ORDER — PRAVASTATIN SODIUM 40 MG
40 TABLET ORAL DAILY
Status: DISCONTINUED | OUTPATIENT
Start: 2024-01-12 | End: 2024-01-13 | Stop reason: HOSPADM

## 2024-01-11 RX ORDER — OXYBUTYNIN CHLORIDE 5 MG/1
10 TABLET, EXTENDED RELEASE ORAL
Status: DISCONTINUED | OUTPATIENT
Start: 2024-01-11 | End: 2024-01-13 | Stop reason: HOSPADM

## 2024-01-11 RX ORDER — ONDANSETRON 2 MG/ML
4 INJECTION INTRAMUSCULAR; INTRAVENOUS EVERY 6 HOURS PRN
Status: DISCONTINUED | OUTPATIENT
Start: 2024-01-11 | End: 2024-01-13 | Stop reason: HOSPADM

## 2024-01-11 RX ADMIN — APIXABAN 2.5 MG: 2.5 TABLET, FILM COATED ORAL at 17:53

## 2024-01-11 RX ADMIN — OXYBUTYNIN CHLORIDE 10 MG: 5 TABLET, EXTENDED RELEASE ORAL at 21:21

## 2024-01-11 RX ADMIN — SODIUM CHLORIDE 1000 ML: 0.9 INJECTION, SOLUTION INTRAVENOUS at 14:48

## 2024-01-11 RX ADMIN — SODIUM CHLORIDE 75 ML/HR: 0.9 INJECTION, SOLUTION INTRAVENOUS at 17:53

## 2024-01-11 NOTE — CASE MANAGEMENT
Case Management Assessment & Discharge Planning Note    Patient name Fang Victor  Location /-01 MRN [id number]  : 1935 Date 2024       Current Admission Date: 2024  Current Admission Diagnosis:Acute kidney injury superimposed on chronic kidney disease    Patient Active Problem List    Diagnosis Date Noted    Acute kidney injury superimposed on chronic kidney disease  2024    DVT (deep venous thrombosis) (HCC) 2024    ILD (interstitial lung disease) (HCC) 2024    HTN (hypertension) 2024    Lymphedema 2024      LOS (days): 0  Geometric Mean LOS (GMLOS) (days):   Days to GMLOS:     OBJECTIVE:              Current admission status: Inpatient  Referral Reason: Other (discharge planning)    Preferred Pharmacy:   PATIENT/FAMILY REPORTS NO PREFERRED PHARMACY  No address on file      Primary Care Provider: Simba Jameson MD    Primary Insurance: [facility] REP  Secondary Insurance:     ASSESSMENT:  Active Health Care Proxies    There are no active Health Care Proxies on file.       Advance Directives  Does patient have a Health Care POA?: Yes  Does patient have Advance Directives?: Yes  Advance Directives: Living will  Primary Contact: Yazmin (sister)         Readmission Root Cause  30 Day Readmission: No    Patient Information  Admitted from:: Other (comment) ([facility])  Mental Status: Alert  During Assessment patient was accompanied by: Not accompanied during assessment  Assessment information provided by:: Patient  Primary Caregiver: Self  Support Systems: Family members  [facility] Residence: [facility]  What city do you live in?: [place]  Home entry access options. Select all that apply.: No steps to enter home  Type of Current Residence: Facility  Upon entering residence, is there a bedroom on the main floor (no further steps)?: Yes  Upon entering residence, is there a bathroom on the main floor (no further steps)?:  Yes  Living Arrangements: Lives Alone  Is patient a ?: No    Activities of Daily Living Prior to Admission  Functional Status: Assistance  Completes ADLs independently?: No  Level of ADL dependence: Assistance  Ambulates independently?: Yes  Does patient use assisted devices?: Yes  Assisted Devices (DME) used: Walker, Wheelchair  Does patient currently own DME?: Yes  What DME does the patient currently own?: Walker, Wheelchair  Does patient have a history of Outpatient Therapy (PT/OT)?: No  Does the patient have a history of Short-Term Rehab?: Yes ([facility] 2020 - [place])  Does patient have a history of HHC?: No         Patient Information Continued  Income Source: SSI/SSD  Does patient have prescription coverage?: Yes  Does patient receive dialysis treatments?: No  Does patient have a history of substance abuse?: No  Does patient have a history of Mental Health Diagnosis?: No         Means of Transportation  Means of Transport to Appts:: Other (Comment) ([facility] - has an agreement with them - coordinated with [place])      Housing Stability: Low Risk  (1/11/2024)    Housing Stability Vital Sign     Unable to Pay for Housing in the Last Year: No     Number of Places Lived in the Last Year: 1     Unstable Housing in the Last Year: No   Food Insecurity: No Food Insecurity (1/11/2024)    Hunger Vital Sign     Worried About Running Out of Food in the Last Year: Never true     Ran Out of Food in the Last Year: Never true   Transportation Needs: No Transportation Needs (1/11/2024)    PRAPARE - Transportation     Lack of Transportation (Medical): No     Lack of Transportation (Non-Medical): No   Utilities: Not At Risk (1/11/2024)    [facility] Utilities     Threatened with loss of utilities: No       DISCHARGE DETAILS:    Discharge planning discussed with:: Patient  Freedom of Choice: Yes  Comments - Freedom of Choice: Patient wishes to return to [place]  CM contacted family/caregiver?: No- see comments (Patient  declined)  Were Treatment Team discharge recommendations reviewed with patient/caregiver?: Yes  Did patient/caregiver verbalize understanding of patient care needs?: Yes  Were patient/caregiver advised of the risks associated with not following Treatment Team discharge recommendations?: Yes       CM met with patient at the bedside,baseline information  was obtained. CM discussed the role of CM in helping the patient develop a discharge plan and assist the patient in carry out their plan. Patient has been living at [facility] [place] since July 2021. Patient reported overall they are independent aside of bathing support from staff. Patient able to ambulate with a walker or WC. Patient recently completed PT/OT at [facility] within the last month. Patient main support is her sister. Patient wishes to return to [facility].    CM to monitor patient's care and d/c needs.

## 2024-01-11 NOTE — H&P
[facility]  H&P  Name: Fang Victor 88 y.o. female I MRN: [id number]  Unit/Bed#: TR 13B I Date of Admission: 1/11/2024   Date of Service: 1/11/2024 I Hospital Day: 0      Assessment/Plan   * Acute kidney injury superimposed on chronic kidney disease   Assessment & Plan  Lab Results   Component Value Date    EGFR 18 01/11/2024    EGFR 33 08/31/2022    EGFR 33 02/23/2022    CREATININE 2.33 (H) 01/11/2024    CREATININE 1.44 (H) 08/31/2022    CREATININE 1.44 (H) 02/23/2022   Suspect prerenal secondary to having nausea vomiting poor p.o. intake on top of being on lisinopril and Lasix repeat hyperkalemia potassium is normal.  Looks like creatinine baseline is overall 1.3-1.4  Given a liter of fluids will hydrate with normal saline 75 mill per hour patient has Rales but this is compliant with her interstitial lung disease sounds  I's and O's will bladder scan ultrasound of the kidney and bladder  Obtain UA avoid hypotension SBP less than 130 avoid IV contrast and NSAIDs  I's and O's  Obtain urine creatinine and urine sodium urine urea  Bmp in am    Lymphedema  Assessment & Plan  Chronic per patient at base  As joselito needing hydration will hold lasix     HTN (hypertension)  Assessment & Plan  Hold lisinopril in light of joselito  Bp stable avoid <130    ILD (interstitial lung disease) ([facility])  Assessment & Plan  Hence with rales   EVIDENT ON CT CHEST   Referral seen to be made to pulmonary follow up once discharged     DVT (deep venous thrombosis) ([facility])  Assessment & Plan  Hx of  continue eliquis           VTE Pharmacologic Prophylaxis: VTE Score: 3 Moderate Risk (Score 3-4) - Pharmacological DVT Prophylaxis Contraindicated. Sequential Compression Devices Ordered.  Code Status: Level 3 - DNAR and DNI   Discussion with family: pt    Anticipated Length of Stay: Patient will be admitted on an inpatient basis with an anticipated length of stay of greater than 2 midnights secondary to joselito.    Total Time  Spent on Date of Encounter in care of patient: >45 mins. This time was spent on one or more of the following: performing physical exam; counseling and coordination of care; obtaining or reviewing history; documenting in the medical record; reviewing/ordering tests, medications or procedures; communicating with other healthcare professionals and discussing with patient's family/caregivers.    Chief Complaint: Abnormal lab    History of Present Illness:  Fang Victor is a 88 y.o. female with a PMH of interstitial fibrosis who presents with acute kidney injury hyperkalemia patient reported on Sunday she had nausea and vomiting has not been eating that has resolved no abdominal pain currently no diarrhea no chest pain or shortness of breath.  Denies any dysuria states has been urinating.    Review of Systems:  Review of Systems   Constitutional:  Negative for chills and fever.   HENT:  Negative for ear pain and sore throat.    Eyes:  Negative for pain and visual disturbance.   Respiratory:  Negative for cough and shortness of breath.    Cardiovascular:  Negative for chest pain and palpitations.   Gastrointestinal:  Negative for abdominal pain and vomiting.   Genitourinary:  Negative for dysuria and hematuria.   Musculoskeletal:  Negative for arthralgias and back pain.   Skin:  Negative for color change and rash.   Neurological:  Negative for seizures and syncope.   All other systems reviewed and are negative.      Past Medical and Surgical History:   Past Medical History:   Diagnosis Date    Chronic pulmonary embolism (HCC)     COPD (chronic obstructive pulmonary disease) (HCC)     Edema     Hyperlipidemia     Hypertension        History reviewed. No pertinent surgical history.    Meds/Allergies:  Prior to Admission medications    Medication Sig Start Date End Date Taking? Authorizing Provider   albuterol (ProAir HFA) 90 mcg/act inhaler Inhale 2 puffs every 6 (six) hours as needed for wheezing 2/23/22   Saran Pavon  JAIRO Munoz   apixaban (Eliquis) 2.5 mg Take by mouth 2 (two) times a day    Historical Provider, MD   furosemide (LASIX) 40 mg tablet Take 40 mg by mouth daily as needed    Historical Provider, MD   lisinopril (ZESTRIL) 20 mg tablet Take 20 mg by mouth daily    Historical Provider, MD   ondansetron (Zofran ODT) 4 mg disintegrating tablet Take 1 tablet (4 mg total) by mouth every 6 (six) hours as needed for nausea or vomiting 2/23/22   Saran Munoz PA-C   oxybutynin (DITROPAN-XL) 10 MG 24 hr tablet Take 10 mg by mouth daily at bedtime    Historical Provider, MD   pravastatin (PRAVACHOL) 40 mg tablet Take 40 mg by mouth daily    Historical Provider, MD   Pseudoephedrine-guaiFENesin (GUAIFENESIN/PSE SA PO) Take 5 mL by mouth every 6 (six) hours as needed    Historical Provider, MD   timolol (BETIMOL) 0.5 % ophthalmic solution Administer 1 drop into the left eye daily at bedtime      Historical Provider, MD   zinc oxide 20 % ointment Apply topically 2 (two) times a day    Historical Provider, MD FLOWER have reveiwed home medications using records provided by [facility].    Allergies:   Allergies   Allergen Reactions    Codeine Itching    Cephalosporins Rash    Penicillins Rash    Sulfa Antibiotics Rash       Social History:  Marital Status: Unknown   Substance Use History:   Social History     Substance and Sexual Activity   Alcohol Use Never     Social History     Tobacco Use   Smoking Status Former   Smokeless Tobacco Never     Social History     Substance and Sexual Activity   Drug Use Never       Family History:  History reviewed. No pertinent family history.    Physical Exam:     Vitals:   Blood Pressure: 134/87 (01/11/24 1600)  Pulse: 75 (01/11/24 1600)  Temperature: 98.5 °F (36.9 °C) (01/11/24 1359)  Temp Source: Oral (01/11/24 1359)  Respirations: 19 (01/11/24 1359)  Weight - Scale: 103 kg (227 lb 4.7 oz) (01/11/24 1359)  SpO2: 92 % (01/11/24 1600)    Physical Exam  Vitals and nursing note reviewed.    Constitutional:       General: She is not in acute distress.     Appearance: She is well-developed.   HENT:      Head: Normocephalic and atraumatic.   Eyes:      Conjunctiva/sclera: Conjunctivae normal.   Cardiovascular:      Rate and Rhythm: Normal rate and regular rhythm.      Heart sounds: No murmur heard.  Pulmonary:      Effort: Pulmonary effort is normal. No respiratory distress.      Breath sounds: Rales (has ILD c/w) present.   Abdominal:      General: Bowel sounds are normal. There is no distension.      Palpations: Abdomen is soft.      Tenderness: There is no abdominal tenderness.   Musculoskeletal:         General: Swelling (chronic lymphedema) present.      Cervical back: Neck supple.   Skin:     General: Skin is warm and dry.      Capillary Refill: Capillary refill takes less than 2 seconds.   Neurological:      Mental Status: She is alert and oriented to person, place, and time.   Psychiatric:         Mood and Affect: Mood normal.          Additional Data:     Lab Results:  Results from last 7 days   Lab Units 01/11/24  1409   WBC Thousand/uL 9.24   HEMOGLOBIN g/dL 12.8   HEMATOCRIT % 39.7   PLATELETS Thousands/uL 239   NEUTROS PCT % 62   LYMPHS PCT % 24   MONOS PCT % 8   EOS PCT % 4     Results from last 7 days   Lab Units 01/11/24  1554 01/11/24  1515 01/11/24  1409   SODIUM mmol/L  --   --  136   POTASSIUM mmol/L 5.2   < > 5.9*   CHLORIDE mmol/L  --   --  106   CO2 mmol/L  --   --  20*   BUN mg/dL  --   --  76*   CREATININE mg/dL  --   --  2.33*   ANION GAP mmol/L  --   --  10   CALCIUM mg/dL  --   --  8.9   ALBUMIN g/dL  --   --  3.9   TOTAL BILIRUBIN mg/dL  --   --  0.54   ALK PHOS U/L  --   --  56   ALT U/L  --   --  11   AST U/L  --   --  24   GLUCOSE RANDOM mg/dL  --   --  118    < > = values in this interval not displayed.                       Lines/Drains:  Invasive Devices       Peripheral Intravenous Line  Duration             Peripheral IV 01/11/24 Right Antecubital <1 day                         Imaging: No pertinent imaging reviewed.  US kidney and bladder    (Results Pending)       EKG and Other Studies Reviewed on Admission:   EKG: No EKG obtained.    ** Please Note: This note has been constructed using a voice recognition system. **

## 2024-01-11 NOTE — PLAN OF CARE
Problem: Potential for Falls  Goal: Patient will remain free of falls  Description: INTERVENTIONS:  - Educate patient/family on patient safety including physical limitations  - Instruct patient to call for assistance with activity   - Consult OT/PT to assist with strengthening/mobility   - Keep Call bell within reach  - Keep bed low and locked with side rails adjusted as appropriate  - Keep care items and personal belongings within reach  - Initiate and maintain comfort rounds  - Make Fall Risk Sign visible to staff  - Offer Toileting every 2 Hours, in advance of need  - Initiate/Maintain bed/chair alarm  - Obtain necessary fall risk management equipment: alarms  - Apply yellow socks and bracelet for high fall risk patients  - Consider moving patient to room near nurses station  1/11/2024 1831 by Lashonda Moctezuma RN  Outcome: Progressing  1/11/2024 1830 by Lashonda Moctezuma RN  Outcome: Progressing     Problem: PAIN - ADULT  Goal: Verbalizes/displays adequate comfort level or baseline comfort level  Description: Interventions:  - Encourage patient to monitor pain and request assistance  - Assess pain using appropriate pain scale  - Administer analgesics based on type and severity of pain and evaluate response  - Implement non-pharmacological measures as appropriate and evaluate response  - Consider cultural and social influences on pain and pain management  - Notify physician/advanced practitioner if interventions unsuccessful or patient reports new pain  1/11/2024 1831 by Lashonda Moctezuma RN  Outcome: Progressing  1/11/2024 1830 by Lashonda Moctezuma RN  Outcome: Progressing     Problem: INFECTION - ADULT  Goal: Absence or prevention of progression during hospitalization  Description: INTERVENTIONS:  - Assess and monitor for signs and symptoms of infection  - Monitor lab/diagnostic results  - Monitor all insertion sites, i.e. indwelling lines, tubes, and drains  - Monitor endotracheal if appropriate and nasal secretions for  changes in amount and color  - [place] appropriate cooling/warming therapies per order  - Administer medications as ordered  - Instruct and encourage patient and family to use good hand hygiene technique  - Identify and instruct in appropriate isolation precautions for identified infection/condition  1/11/2024 1831 by Lashonda Moctezuma RN  Outcome: Progressing  1/11/2024 1830 by Lashonda Moctezuma RN  Outcome: Progressing     Problem: SAFETY ADULT  Goal: Patient will remain free of falls  Description: INTERVENTIONS:  - Educate patient/family on patient safety including physical limitations  - Instruct patient to call for assistance with activity   - Consult OT/PT to assist with strengthening/mobility   - Keep Call bell within reach  - Keep bed low and locked with side rails adjusted as appropriate  - Keep care items and personal belongings within reach  - Initiate and maintain comfort rounds  - Make Fall Risk Sign visible to staff  - Offer Toileting every 2 Hours, in advance of need  - Initiate/Maintain bed/chair alarm  - Obtain necessary fall risk management equipment: alarms  - Apply yellow socks and bracelet for high fall risk patients  - Consider moving patient to room near nurses station  1/11/2024 1831 by Lashonda Moctezuma RN  Outcome: Progressing  1/11/2024 1830 by Lashonda Moctezuma RN  Outcome: Progressing  Goal: Maintain or return to baseline ADL function  Description: INTERVENTIONS:  -  Assess patient's ability to carry out ADLs; assess patient's baseline for ADL function and identify physical deficits which impact ability to perform ADLs (bathing, care of mouth/teeth, toileting, grooming, dressing, etc.)  - Assess/evaluate cause of self-care deficits   - Assess range of motion  - Assess patient's mobility; develop plan if impaired  - Assess patient's need for assistive devices and provide as appropriate  - Encourage maximum independence but intervene and supervise when necessary  - Involve family in performance of  ADLs  - Assess for home care needs following discharge   - Consider OT consult to assist with ADL evaluation and planning for discharge  - Provide patient education as appropriate  1/11/2024 1831 by Lashonda Moctezuma RN  Outcome: Progressing  1/11/2024 1830 by Lashonda Moctezuma RN  Outcome: Progressing  Goal: Maintains/Returns to pre admission functional level  Description: INTERVENTIONS:  - Perform AM-PAC 6 Click Basic Mobility/ Daily Activity assessment daily.  - Set and communicate daily mobility goal to care team and patient/family/caregiver.   - Collaborate with rehabilitation services on mobility goals if consulted  - Perform Range of Motion TID times a day.  - Reposition patient every 2 hours.  - Dangle patient TID times a day  - Stand patient TID times a day  - Ambulate patient TId times a day  - Out of bed to chair TID times a day   - Out of bed for meals TID times a day  - Out of bed for toileting  - Record patient progress and toleration of activity level   1/11/2024 1831 by Lashonda Moctezuma RN  Outcome: Progressing  1/11/2024 1830 by Lashonda Moctezuma RN  Outcome: Progressing     Problem: DISCHARGE PLANNING  Goal: Discharge to home or other facility with appropriate resources  Description: INTERVENTIONS:  - Identify barriers to discharge w/patient and caregiver  - Arrange for needed discharge resources and transportation as appropriate  - Identify discharge learning needs (meds, wound care, etc.)  - Arrange for interpretive services to assist at discharge as needed  - Refer to Case Management Department for coordinating discharge planning if the patient needs post-hospital services based on physician/advanced practitioner order or complex needs related to functional status, cognitive ability, or social support system  1/11/2024 1831 by Lashonda Moctezuma RN  Outcome: Progressing  1/11/2024 1830 by Lashonda Moctezuma RN  Outcome: Progressing     Problem: Knowledge Deficit  Goal: Patient/family/caregiver demonstrates  understanding of disease process, treatment plan, medications, and discharge instructions  Description: Complete learning assessment and assess knowledge base.  Interventions:  - Provide teaching at level of understanding  - Provide teaching via preferred learning methods  1/11/2024 1831 by Lashonda Moctezuma RN  Outcome: Progressing  1/11/2024 1830 by Lashonda Moctezuma RN  Outcome: Progressing     Problem: METABOLIC, FLUID AND ELECTROLYTES - ADULT  Goal: Electrolytes maintained within normal limits  Description: INTERVENTIONS:  - Monitor labs and assess patient for signs and symptoms of electrolyte imbalances  - Administer electrolyte replacement as ordered  - Monitor response to electrolyte replacements, including repeat lab results as appropriate  - Instruct patient on fluid and nutrition as appropriate  1/11/2024 1831 by Lashonda Moctezuma RN  Outcome: Progressing  1/11/2024 1830 by Lashonda Moctezuma RN  Outcome: Progressing

## 2024-01-11 NOTE — ASSESSMENT & PLAN NOTE
Hence with rales   EVIDENT ON CT CHEST   Referral seen to be made to pulmonary follow up once discharged

## 2024-01-11 NOTE — ASSESSMENT & PLAN NOTE
Lab Results   Component Value Date    EGFR 18 01/11/2024    EGFR 33 08/31/2022    EGFR 33 02/23/2022    CREATININE 2.33 (H) 01/11/2024    CREATININE 1.44 (H) 08/31/2022    CREATININE 1.44 (H) 02/23/2022   Suspect prerenal secondary to having nausea vomiting poor p.o. intake on top of being on lisinopril and Lasix repeat hyperkalemia potassium is normal.  Looks like creatinine baseline is overall 1.3-1.4  Given a liter of fluids will hydrate with normal saline 75 mill per hour patient has Rales but this is compliant with her interstitial lung disease sounds  I's and O's will bladder scan ultrasound of the kidney and bladder  Obtain UA avoid hypotension SBP less than 130 avoid IV contrast and NSAIDs  I's and O's  Obtain urine creatinine and urine sodium urine urea  Bmp in am

## 2024-01-11 NOTE — ED PROVIDER NOTES
History  Chief Complaint   Patient presents with    Abnormal Lab     Abnormal BUN and CR per nursing home.      88 yr old female presents from the NH for evaluation of elevated BUN/cr. Patient denies any complaints. She denies any nausea vomiting or diarrhea and she admits to having poor PO intake.         Prior to Admission Medications   Prescriptions Last Dose Informant Patient Reported? Taking?   Pseudoephedrine-guaiFENesin (GUAIFENESIN/PSE SA PO)   Yes No   Sig: Take 5 mL by mouth every 6 (six) hours as needed   albuterol (ProAir HFA) 90 mcg/act inhaler   No No   Sig: Inhale 2 puffs every 6 (six) hours as needed for wheezing   apixaban (Eliquis) 2.5 mg   Yes No   Sig: Take by mouth 2 (two) times a day   furosemide (LASIX) 40 mg tablet   Yes No   Sig: Take 40 mg by mouth daily as needed   lisinopril (ZESTRIL) 20 mg tablet   Yes No   Sig: Take 20 mg by mouth daily   ondansetron (Zofran ODT) 4 mg disintegrating tablet   No No   Sig: Take 1 tablet (4 mg total) by mouth every 6 (six) hours as needed for nausea or vomiting   oxybutynin (DITROPAN-XL) 10 MG 24 hr tablet   Yes No   Sig: Take 10 mg by mouth daily at bedtime   pravastatin (PRAVACHOL) 40 mg tablet   Yes No   Sig: Take 40 mg by mouth daily   timolol (BETIMOL) 0.5 % ophthalmic solution   Yes No   Sig: Administer 1 drop into the left eye daily at bedtime     zinc oxide 20 % ointment   Yes No   Sig: Apply topically 2 (two) times a day      Facility-Administered Medications: None       Past Medical History:   Diagnosis Date    Chronic pulmonary embolism (HCC)     COPD (chronic obstructive pulmonary disease) (HCC)     Edema     Hyperlipidemia     Hypertension        History reviewed. No pertinent surgical history.    History reviewed. No pertinent family history.  I have reviewed and agree with the history as documented.    E-Cigarette/Vaping    E-Cigarette Use Never User      E-Cigarette/Vaping Substances     Social History     Tobacco Use    Smoking status:  Former    Smokeless tobacco: Never   Vaping Use    Vaping status: Never Used   Substance Use Topics    Alcohol use: Never    Drug use: Never       Review of Systems   Constitutional:  Negative for chills and fever.   HENT:  Negative for ear pain and sore throat.    Eyes:  Negative for pain and visual disturbance.   Respiratory:  Negative for cough and shortness of breath.    Cardiovascular:  Negative for chest pain and palpitations.   Gastrointestinal:  Negative for abdominal pain and vomiting.   Genitourinary:  Negative for dysuria and hematuria.   Musculoskeletal:  Negative for arthralgias and back pain.   Skin:  Negative for color change and rash.   Neurological:  Negative for seizures and syncope.   All other systems reviewed and are negative.      Physical Exam  Physical Exam  Vitals and nursing note reviewed.   Constitutional:       General: She is not in acute distress.     Appearance: Normal appearance. She is well-developed and normal weight.   HENT:      Head: Normocephalic and atraumatic.      Right Ear: External ear normal.      Left Ear: External ear normal.      Nose: Nose normal. No congestion or rhinorrhea.      Mouth/Throat:      Mouth: Mucous membranes are moist.      Pharynx: No oropharyngeal exudate or posterior oropharyngeal erythema.   Eyes:      General:         Right eye: No discharge.         Left eye: No discharge.      Extraocular Movements: Extraocular movements intact.      Conjunctiva/sclera: Conjunctivae normal.   Cardiovascular:      Rate and Rhythm: Normal rate and regular rhythm.      Heart sounds: No murmur heard.  Pulmonary:      Effort: Pulmonary effort is normal. No respiratory distress.      Breath sounds: Normal breath sounds. No stridor. No wheezing or rhonchi.   Abdominal:      General: Abdomen is flat. Bowel sounds are normal. There is no distension.      Palpations: Abdomen is soft. There is no mass.      Tenderness: There is no abdominal tenderness. There is no guarding  or rebound.      Hernia: No hernia is present.   Musculoskeletal:         General: No swelling, tenderness, deformity or signs of injury. Normal range of motion.      Cervical back: Normal range of motion and neck supple. No rigidity or tenderness.      Right lower leg: No edema.      Left lower leg: No edema.   Skin:     General: Skin is warm and dry.      Capillary Refill: Capillary refill takes less than 2 seconds.   Neurological:      General: No focal deficit present.      Mental Status: She is alert and oriented to person, place, and time. Mental status is at baseline.      Cranial Nerves: No cranial nerve deficit.      Sensory: No sensory deficit.      Motor: No weakness.      Coordination: Coordination normal.      Gait: Gait normal.   Psychiatric:         Mood and Affect: Mood normal.     /86    Vital Signs  ED Triage Vitals   Temperature Pulse Respirations Blood Pressure SpO2   01/11/24 1359 01/11/24 1359 01/11/24 1359 01/11/24 1359 01/11/24 1359   98.5 °F (36.9 °C) 84 19 133/63 95 %      Temp Source Heart Rate Source Patient Position - Orthostatic VS BP Location FiO2 (%)   01/11/24 1359 01/11/24 1359 01/11/24 1359 01/11/24 1359 --   Oral Monitor Sitting Left arm       Pain Score       01/11/24 1759       No Pain           Vitals:    01/11/24 1600 01/11/24 1700 01/11/24 1737 01/11/24 2130   BP: 134/87 118/78 144/63 (!) 117/48   Pulse: 75  71 74   Patient Position - Orthostatic VS:   Lying          Visual Acuity      ED Medications  Medications   apixaban (ELIQUIS) tablet 2.5 mg (2.5 mg Oral Given 1/11/24 1753)   oxybutynin (DITROPAN-XL) 24 hr tablet 10 mg (10 mg Oral Given 1/11/24 2121)   pravastatin (PRAVACHOL) tablet 40 mg (has no administration in time range)   timolol (TIMOPTIC) 0.5 % ophthalmic solution 1 drop (has no administration in time range)   albuterol (PROVENTIL HFA,VENTOLIN HFA) inhaler 2 puff (has no administration in time range)   acetaminophen (TYLENOL) tablet 650 mg (has no administration  in time range)   ondansetron (ZOFRAN) injection 4 mg (has no administration in time range)   sodium chloride 0.9 % infusion (75 mL/hr Intravenous New Bag 1/11/24 1753)   sodium chloride 0.9 % bolus 1,000 mL (1,000 mL Intravenous New Bag 1/11/24 1448)       Diagnostic Studies  Results Reviewed       Procedure Component Value Units Date/Time    Sodium, urine, random [[id number]]     Lab Status: No result Specimen: Urine     Creatinine, urine, random [[id number]]     Lab Status: No result Specimen: Urine     Urinalysis with microscopic [[id number]]     Lab Status: No result Specimen: Urine     Urea nitrogen, urine [[id number]]     Lab Status: No result Specimen: Urine     MRSA culture [[id number]]     Lab Status: No result Specimen: Nares from Nose     Potassium [[id number]]  (Normal) Collected: 01/11/24 1554    Lab Status: Final result Specimen: Blood from Arm, Right Updated: 01/11/24 1620     Potassium 5.2 mmol/L     Potassium [[id number]]  (Abnormal) Collected: 01/11/24 1515    Lab Status: Final result Specimen: Blood from Arm, Right Updated: 01/11/24 1538     Potassium 5.7 mmol/L     Comprehensive metabolic panel [[id number]]  (Abnormal) Collected: 01/11/24 1409    Lab Status: Final result Specimen: Blood from Arm, Left Updated: 01/11/24 1444     Sodium 136 mmol/L      Potassium 5.9 mmol/L      Chloride 106 mmol/L      CO2 20 mmol/L      ANION GAP 10 mmol/L      BUN 76 mg/dL      Creatinine 2.33 mg/dL      Glucose 118 mg/dL      Calcium 8.9 mg/dL      AST 24 U/L      ALT 11 U/L      Alkaline Phosphatase 56 U/L      Total Protein 7.5 g/dL      Albumin 3.9 g/dL      Total Bilirubin 0.54 mg/dL      eGFR 18 ml/min/1.73sq m     Narrative:      [employer] guidelines for Chronic Kidney Disease (CKD):     Stage 1 with normal or high GFR (GFR > 90 mL/min/1.73 square meters)    Stage 2 Mild CKD (GFR = 60-89 mL/min/1.73 square meters)    Stage 3A Moderate CKD (GFR = 45-59 mL/min/1.73 square meters)    Stage 3B  Moderate CKD (GFR = 30-44 mL/min/1.73 square meters)    Stage 4 Severe CKD (GFR = 15-29 mL/min/1.73 square meters)    Stage 5 End Stage CKD (GFR <15 mL/min/1.73 square meters)  Note: GFR calculation is accurate only with a steady state creatinine    CBC and differential [[id number]]  (Abnormal) Collected: 01/11/24 1409    Lab Status: Final result Specimen: Blood from Arm, Left Updated: 01/11/24 1413     WBC 9.24 Thousand/uL      RBC 3.84 Million/uL      Hemoglobin 12.8 g/dL      Hematocrit 39.7 %       fL      MCH 33.3 pg      MCHC 32.2 g/dL      RDW 13.0 %      MPV 10.0 fL      Platelets 239 Thousands/uL      nRBC 0 /100 WBCs      Neutrophils Relative 62 %      Immat GRANS % 1 %      Lymphocytes Relative 24 %      Monocytes Relative 8 %      Eosinophils Relative 4 %      Basophils Relative 1 %      Neutrophils Absolute 5.82 Thousands/µL      Immature Grans Absolute 0.05 Thousand/uL      Lymphocytes Absolute 2.19 Thousands/µL      Monocytes Absolute 0.71 Thousand/µL      Eosinophils Absolute 0.41 Thousand/µL      Basophils Absolute 0.06 Thousands/µL                    US kidney and bladder    (Results Pending)              Procedures  Procedures         ED Course  ED Course as of 01/11/24 2229   Thu Jan 11, 2024   1636 Case was discussed with the hospitalist who accepts the patient for admission                                 SBIRT 22yo+      Flowsheet Row Most Recent Value   Initial Alcohol Screen: US AUDIT-C     1. How often do you have a drink containing alcohol? 0 Filed at: 01/11/2024 1401   2. How many drinks containing alcohol do you have on a typical day you are drinking?  0 Filed at: 01/11/2024 1401   3a. Male UNDER 65: How often do you have five or more drinks on one occasion? 0 Filed at: 01/11/2024 1401   3b. FEMALE Any Age, or MALE 65+: How often do you have 4 or more drinks on one occassion? 0 Filed at: 01/11/2024 1401   Audit-C Score 0 Filed at: 01/11/2024 1401   DEMETRIS: How many times in the past  year have you...    Used an illegal drug or used a prescription medication for non-medical reasons? Never Filed at: 01/11/2024 1401                      Medical Decision Making  Amount and/or Complexity of Data Reviewed  Labs: ordered.    Risk  Decision regarding hospitalization.             Disposition  Final diagnoses:   MICHAEL (acute kidney injury) ([facility])     Time reflects when diagnosis was documented in both MDM as applicable and the Disposition within this note       Time User Action Codes Description Comment    1/11/2024  4:29 PM Erica Slater Add [N17.9] MICHAEL (acute kidney injury) ([facility])           ED Disposition       ED Disposition   Admit    Condition   Stable    Date/Time   Thu Jan 11, 2024 5690    Comment                  Follow-up Information    None         Current Discharge Medication List        CONTINUE these medications which have NOT CHANGED    Details   albuterol (ProAir HFA) 90 mcg/act inhaler Inhale 2 puffs every 6 (six) hours as needed for wheezing  Qty: 8.5 g, Refills: 0    Comments: Substitution to a formulary equivalent within the same pharmaceutical class is authorized.  Associated Diagnoses: Bronchospasm      apixaban (Eliquis) 2.5 mg Take by mouth 2 (two) times a day      furosemide (LASIX) 40 mg tablet Take 40 mg by mouth daily as needed      lisinopril (ZESTRIL) 20 mg tablet Take 20 mg by mouth daily      ondansetron (Zofran ODT) 4 mg disintegrating tablet Take 1 tablet (4 mg total) by mouth every 6 (six) hours as needed for nausea or vomiting  Qty: 20 tablet, Refills: 0    Associated Diagnoses: Nausea vomiting and diarrhea      oxybutynin (DITROPAN-XL) 10 MG 24 hr tablet Take 10 mg by mouth daily at bedtime      pravastatin (PRAVACHOL) 40 mg tablet Take 40 mg by mouth daily      Pseudoephedrine-guaiFENesin (GUAIFENESIN/PSE SA PO) Take 5 mL by mouth every 6 (six) hours as needed      timolol (BETIMOL) 0.5 % ophthalmic solution Administer 1 drop into the left eye daily at bedtime        zinc  oxide 20 % ointment Apply topically 2 (two) times a day             No discharge procedures on file.    PDMP Review       None            ED Provider  Electronically Signed by             Erica Slater DO  01/11/24 0566

## 2024-01-12 ENCOUNTER — HOME HEALTH ADMISSION (OUTPATIENT)
Dept: HOME HEALTH SERVICES | Facility: HOME HEALTHCARE | Age: 89
End: 2024-01-12

## 2024-01-12 LAB
ANION GAP SERPL CALCULATED.3IONS-SCNC: 9 MMOL/L
BACTERIA UR QL AUTO: ABNORMAL /HPF
BILIRUB UR QL STRIP: NEGATIVE
BUN SERPL-MCNC: 64 MG/DL (ref 5–25)
CALCIUM SERPL-MCNC: 8.4 MG/DL (ref 8.4–10.2)
CHLORIDE SERPL-SCNC: 112 MMOL/L (ref 96–108)
CLARITY UR: CLEAR
CO2 SERPL-SCNC: 18 MMOL/L (ref 21–32)
COLOR UR: YELLOW
CREAT SERPL-MCNC: 1.83 MG/DL (ref 0.6–1.3)
CREAT UR-MCNC: 42.5 MG/DL
ERYTHROCYTE [DISTWIDTH] IN BLOOD BY AUTOMATED COUNT: 12.9 % (ref 11.6–15.1)
GFR SERPL CREATININE-BSD FRML MDRD: 24 ML/MIN/1.73SQ M
GLUCOSE SERPL-MCNC: 87 MG/DL (ref 65–140)
GLUCOSE UR STRIP-MCNC: NEGATIVE MG/DL
HCT VFR BLD AUTO: 36.4 % (ref 34.8–46.1)
HGB BLD-MCNC: 11.5 G/DL (ref 11.5–15.4)
HGB UR QL STRIP.AUTO: NEGATIVE
KETONES UR STRIP-MCNC: NEGATIVE MG/DL
LEUKOCYTE ESTERASE UR QL STRIP: NEGATIVE
MAGNESIUM SERPL-MCNC: 2.2 MG/DL (ref 1.9–2.7)
MCH RBC QN AUTO: 32.7 PG (ref 26.8–34.3)
MCHC RBC AUTO-ENTMCNC: 31.6 G/DL (ref 31.4–37.4)
MCV RBC AUTO: 103 FL (ref 82–98)
NITRITE UR QL STRIP: POSITIVE
NON-SQ EPI CELLS URNS QL MICRO: ABNORMAL /HPF
PH UR STRIP.AUTO: 6 [PH]
PLATELET # BLD AUTO: 178 THOUSANDS/UL (ref 149–390)
PMV BLD AUTO: 9.2 FL (ref 8.9–12.7)
POTASSIUM SERPL-SCNC: 4.9 MMOL/L (ref 3.5–5.3)
PROT UR STRIP-MCNC: NEGATIVE MG/DL
RBC # BLD AUTO: 3.52 MILLION/UL (ref 3.81–5.12)
RBC #/AREA URNS AUTO: ABNORMAL /HPF
SODIUM 24H UR-SCNC: 103 MOL/L
SODIUM SERPL-SCNC: 139 MMOL/L (ref 135–147)
SP GR UR STRIP.AUTO: 1.01 (ref 1–1.03)
UROBILINOGEN UR QL STRIP.AUTO: 0.2 E.U./DL
UUN 24H UR-MCNC: 544 MG/DL
WBC # BLD AUTO: 7.79 THOUSAND/UL (ref 4.31–10.16)
WBC #/AREA URNS AUTO: ABNORMAL /HPF

## 2024-01-12 PROCEDURE — 84540 ASSAY OF URINE/UREA-N: CPT | Performed by: FAMILY MEDICINE

## 2024-01-12 PROCEDURE — 85027 COMPLETE CBC AUTOMATED: CPT | Performed by: FAMILY MEDICINE

## 2024-01-12 PROCEDURE — 99232 SBSQ HOSP IP/OBS MODERATE 35: CPT | Performed by: FAMILY MEDICINE

## 2024-01-12 PROCEDURE — 80048 BASIC METABOLIC PNL TOTAL CA: CPT | Performed by: FAMILY MEDICINE

## 2024-01-12 PROCEDURE — 87086 URINE CULTURE/COLONY COUNT: CPT | Performed by: FAMILY MEDICINE

## 2024-01-12 PROCEDURE — 81001 URINALYSIS AUTO W/SCOPE: CPT | Performed by: FAMILY MEDICINE

## 2024-01-12 PROCEDURE — 84300 ASSAY OF URINE SODIUM: CPT | Performed by: FAMILY MEDICINE

## 2024-01-12 PROCEDURE — 97166 OT EVAL MOD COMPLEX 45 MIN: CPT

## 2024-01-12 PROCEDURE — 87077 CULTURE AEROBIC IDENTIFY: CPT | Performed by: FAMILY MEDICINE

## 2024-01-12 PROCEDURE — 82570 ASSAY OF URINE CREATININE: CPT | Performed by: FAMILY MEDICINE

## 2024-01-12 PROCEDURE — 97163 PT EVAL HIGH COMPLEX 45 MIN: CPT

## 2024-01-12 PROCEDURE — 87081 CULTURE SCREEN ONLY: CPT | Performed by: FAMILY MEDICINE

## 2024-01-12 PROCEDURE — 87186 SC STD MICRODIL/AGAR DIL: CPT | Performed by: FAMILY MEDICINE

## 2024-01-12 PROCEDURE — 83735 ASSAY OF MAGNESIUM: CPT | Performed by: FAMILY MEDICINE

## 2024-01-12 PROCEDURE — 92610 EVALUATE SWALLOWING FUNCTION: CPT

## 2024-01-12 RX ORDER — LEVOFLOXACIN 750 MG/1
750 TABLET, FILM COATED ORAL EVERY OTHER DAY
Status: DISCONTINUED | OUTPATIENT
Start: 2024-01-12 | End: 2024-01-13 | Stop reason: HOSPADM

## 2024-01-12 RX ORDER — PANTOPRAZOLE SODIUM 40 MG/1
40 TABLET, DELAYED RELEASE ORAL
Status: DISCONTINUED | OUTPATIENT
Start: 2024-01-12 | End: 2024-01-13 | Stop reason: HOSPADM

## 2024-01-12 RX ADMIN — OXYBUTYNIN CHLORIDE 10 MG: 5 TABLET, EXTENDED RELEASE ORAL at 21:43

## 2024-01-12 RX ADMIN — PANTOPRAZOLE SODIUM 40 MG: 40 TABLET, DELAYED RELEASE ORAL at 12:45

## 2024-01-12 RX ADMIN — SODIUM CHLORIDE 75 ML/HR: 0.9 INJECTION, SOLUTION INTRAVENOUS at 16:46

## 2024-01-12 RX ADMIN — APIXABAN 2.5 MG: 2.5 TABLET, FILM COATED ORAL at 16:46

## 2024-01-12 RX ADMIN — TIMOLOL MALEATE 1 DROP: 5 SOLUTION/ DROPS OPHTHALMIC at 07:57

## 2024-01-12 RX ADMIN — SODIUM CHLORIDE 75 ML/HR: 0.9 INJECTION, SOLUTION INTRAVENOUS at 05:15

## 2024-01-12 RX ADMIN — PRAVASTATIN SODIUM 40 MG: 40 TABLET ORAL at 07:55

## 2024-01-12 RX ADMIN — APIXABAN 2.5 MG: 2.5 TABLET, FILM COATED ORAL at 07:55

## 2024-01-12 RX ADMIN — LEVOFLOXACIN 750 MG: 750 TABLET, FILM COATED ORAL at 12:45

## 2024-01-12 NOTE — PLAN OF CARE
Problem: OCCUPATIONAL THERAPY ADULT  Goal: Performs self-care activities at highest level of function for planned discharge setting.  See evaluation for individualized goals.  Description: Treatment Interventions: ADL retraining, UE strengthening/ROM, Endurance training, Functional transfer training, Compensatory technique education, Energy conservation, Activityengagement          See flowsheet documentation for full assessment, interventions and recommendations.   1/12/2024 1157 by Sarahi Franklin OT  Outcome: Progressing  Note: Limitation: Decreased ADL status, Decreased UE strength, Decreased Safe judgement during ADL, Decreased endurance, Decreased self-care trans  Prognosis: Fair  Assessment: Pt is a 88 y.o. female seen for OT evaluation s/p admit to  [facility]  on 1/11/2024 w/ Acute kidney injury superimposed on chronic kidney disease .  Comorbidities affecting pt's functional performance at time of assessment include: HTN, COPD, and hyperlipidemia . Personal factors affecting pt at time of IE include:difficulty performing ADLS and health management . Prior to admission, pt was reportedly requires minimal assistance for ADL tasks, Mod I for functional mobility. Upon evaluation: Pt requires Mod A for LB ADLs, Min A for UB ADLs 2* the following deficits impacting occupational performance: decreased strength, decreased balance, decreased tolerance, and decreased safety awareness. Pt on RA during session with SPO2 remaining 90% or greater. Pt to benefit from continued skilled OT tx while in the hospital to address deficits as defined above and maximize level of functional independence w ADL's and functional mobility. Occupational Performance areas to address include: bathing/shower, toilet hygiene, dressing, health maintenance, functional mobility, and clothing management. The patient's raw score on the -PAC Daily Activity Inpatient Short Form is 16. A raw score of less than 19 suggests the patient may benefit  from discharge to post-acute rehabilitation services. Discharge recommendation at this time is level level 2 (moderate resource intensity).  Pt benefited from co-evaluation of skilled OT and PT therapists in order to most appropriately address functional deficits d/t extensive assistance required for safe functional mobility, decreased activity tolerance, and regression from functioning level prior to admission and/or onset of present illness. OT/PT objectives were addressed separately; please see PT note for specific goal areas targeted.     Rehab Resource Intensity Level, OT: II (Moderate Resource Intensity)       1/12/2024 1157 by Sarahi Franklin OT  Outcome: Progressing  Note: Limitation: Decreased ADL status, Decreased UE strength, Decreased Safe judgement during ADL, Decreased endurance, Decreased self-care trans  Prognosis: Fair  Assessment: Pt is a 88 y.o. female seen for OT evaluation s/p admit to  [facility]  on 1/11/2024 w/ Acute kidney injury superimposed on chronic kidney disease .  Comorbidities affecting pt's functional performance at time of assessment include: HTN, COPD, and hyperlipidemia . Personal factors affecting pt at time of IE include:difficulty performing ADLS and health management . Prior to admission, pt was reportedly requires minimal assistance for ADL tasks, Mod I for functional mobility. Upon evaluation: Pt requires Mod A for LB ADLs, Min A for UB ADLs 2* the following deficits impacting occupational performance: decreased strength, decreased balance, decreased tolerance, and decreased safety awareness. Pt on RA during session with SPO2 remaining 90% or greater. Pt to benefit from continued skilled OT tx while in the hospital to address deficits as defined above and maximize level of functional independence w ADL's and functional mobility. Occupational Performance areas to address include: bathing/shower, toilet hygiene, dressing, health maintenance, functional mobility, and clothing  management. The patient's raw score on the AM-PAC Daily Activity Inpatient Short Form is 16. A raw score of less than 19 suggests the patient may benefit from discharge to post-acute rehabilitation services. Discharge recommendation at this time is level level 2 (moderate resource intensity).  Pt benefited from co-evaluation of skilled OT and PT therapists in order to most appropriately address functional deficits d/t extensive assistance required for safe functional mobility, decreased activity tolerance, and regression from functioning level prior to admission and/or onset of present illness. OT/PT objectives were addressed separately; please see PT note for specific goal areas targeted.     Rehab Resource Intensity Level, OT: II (Moderate Resource Intensity)

## 2024-01-12 NOTE — ASSESSMENT & PLAN NOTE
Lab Results   Component Value Date    EGFR 24 01/12/2024    EGFR 18 01/11/2024    EGFR 33 08/31/2022    CREATININE 1.83 (H) 01/12/2024    CREATININE 2.33 (H) 01/11/2024    CREATININE 1.44 (H) 08/31/2022   Suspect prerenal secondary to having nausea vomiting poor p.o. intake on top of being on lisinopril and Lasix repeat hyperkalemia potassium is normal.  Looks like creatinine baseline is overall 1.3-1.4  Given a liter of fluids will hydrate with normal saline 75 mill per hour patient has Rales but this is compliant with her interstitial lung disease sounds. Cr improved will hydrate for another day   I's and O's will bladder scan ultrasound of the kidney and bladder  Obtain UA Is with uti  hypotension SBP less than 130 avoid IV contrast and NSAIDs  I's and O's  Obtain urine creatinine and urine sodium urine urea awaiting results  Bmp in am  Us kidney and bladder - no obstruction

## 2024-01-12 NOTE — PROGRESS NOTES
Patient:    MRN:  [id number]    Zach Request ID:  [id number]    Level of care reserved:  Home Health Agency    Partner Reserved:  [facility], [place], [place] [id number] [phone]    Clinical needs requested:    Geography searched:  [phone]    Start of Service:    Request sent:  2:40pm EST on 1/12/2024 by Rachel Ferreira    Partner reserved:  3:07pm EST on 1/12/2024 by Rachel Ferreira    Choice list shared:

## 2024-01-12 NOTE — UTILIZATION REVIEW
Initial Clinical Review    Admission: Date/Time/Statement:   Admission Orders (From admission, onward)       Ordered        01/11/24 1630  INPATIENT ADMISSION  Once                          Orders Placed This Encounter   Procedures    INPATIENT ADMISSION     Standing Status:   Standing     Number of Occurrences:   1     Order Specific Question:   Level of Care     Answer:   Med Surg [16]     Order Specific Question:   Estimated length of stay     Answer:   More than 2 Midnights     Order Specific Question:   Certification     Answer:   I certify that inpatient services are medically necessary for this patient for a duration of greater than two midnights. See H&P and MD Progress Notes for additional information about the patient's course of treatment.     ED Arrival Information       Expected   -    Arrival   1/11/2024 13:56    Acuity   Urgent              Means of arrival   Ambulance    Escorted by   [facility]   Hospitalist    Admission type   Emergency              Arrival complaint   abnormal kidney function             Chief Complaint   Patient presents with    Abnormal Lab     Abnormal BUN and CR per nursing home.        Initial Presentation: 88 y.o. female to ED via EMS from home   Present to ED with acute kidney injury hyperkalemia. patient reported on Sunday she had nausea and vomiting and has not been eating.   PMHX:  interstitial fibrosis; COPD; HLD; HTN  Admitted to [facility] with DX: Acute kidney injury superimposed on chronic kidney disease   on exam: lungs with rales; Cr 2.33 (baseline 1.3-1.4)   PLAN: cont ivf; monitor labs; f/u U/S bladder/ kidneys; f/u UA; fluid restriction; f/u urine creatinine and urine sodium urine urea       Date: 1/12/24      Day 2  Cr improved will hydrate for another day ;  Us kidney and bladder - no obstruction   Plan: cont ivf; monitor labs; f/u UA; fluid restriction; f/u urine creatinine and urine sodium urine urea; hold lasix    Date: 1/13/24     Day 3: Has  surpassed a 2nd midnight with active treatments and services, which include cont ivf; monitor labs.      ED Triage Vitals   Temperature Pulse Respirations Blood Pressure SpO2   01/11/24 1359 01/11/24 1359 01/11/24 1359 01/11/24 1359 01/11/24 1359   98.5 °F (36.9 °C) 84 19 133/63 95 %      Temp Source Heart Rate Source Patient Position - Orthostatic VS BP Location FiO2 (%)   01/11/24 1359 01/11/24 1359 01/11/24 1359 01/11/24 1359 --   Oral Monitor Sitting Left arm       Pain Score       01/11/24 1759       No Pain          Wt Readings from Last 1 Encounters:   01/11/24 103 kg (227 lb 4.7 oz)     Additional Vital Signs:   Date/Time Temp Pulse Resp BP MAP (mmHg) SpO2 O2 Device Patient Position - Orthostatic VS   01/13/24 07:16:02 97.5 °F (36.4 °C) 66 19 129/54 79 93 % -- --   01/12/24 22:52:08 97.5 °F (36.4 °C) 70 -- 119/58 78 93 % None (Room air) --   01/12/24 14:04:49 97.3 °F (36.3 °C) Abnormal  67 18 114/55 75 94 %       Date/Time Temp Pulse Resp BP MAP (mmHg) SpO2 O2 Device Patient Position - Orthostatic VS   01/12/24 0755 -- -- -- -- -- -- None (Room air) --   01/12/24 07:17:03 97.3 °F (36.3 °C) Abnormal  66 18 115/68 84 94 % -- --   01/11/24 21:30:39 97.5 °F (36.4 °C) 74 18 117/48 Abnormal  71 96 % None (Room air) --   01/11/24 1811 -- -- -- -- -- -- None (Room air) --   01/11/24 17:37:17 97.3 °F (36.3 °C) Abnormal  71 17 144/63 90 97 % None (Room air) Lying   01/11/24 1700 -- -- -- 118/78 93 -- -- --   01/11/24 1600 -- 75 -- 134/87 106 92 % -- --   01/11/24 1410 -- -- -- -- -- -- None (Room air) --   01/11/24 1359 98.5 °F (36.9 °C) 84 19 133/63 -- 95 % None (Room air) Sitting         EKG: None obtained      Pertinent Labs/Diagnostic Test Results:   US kidney and bladder   Final Result by Gabe Howell DO (01/12 0829)      No hydronephrosis.      Mild bilateral renal atrophy.            Workstation performed: [id number]              Results from last 7 days   Lab Units 01/12/24  0520 01/11/24  1409   WBC  Thousand/uL 7.79 9.24   HEMOGLOBIN g/dL 11.5 12.8   HEMATOCRIT % 36.4 39.7   PLATELETS Thousands/uL 178 239   NEUTROS ABS Thousands/µL  --  5.82        Results from last 7 days   Lab Units 01/13/24  0509 01/12/24  0520 01/11/24  1554 01/11/24  1515 01/11/24  1409   SODIUM mmol/L 139 139  --   --  136   POTASSIUM mmol/L 4.6 4.9 5.2 5.7* 5.9*   CHLORIDE mmol/L 112* 112*  --   --  106   CO2 mmol/L 20* 18*  --   --  20*   ANION GAP mmol/L 7 9  --   --  10   BUN mg/dL 48* 64*  --   --  76*   CREATININE mg/dL 1.48* 1.83*  --   --  2.33*   EGFR ml/min/1.73sq m 31 24  --   --  18   CALCIUM mg/dL 8.5 8.4  --   --  8.9   MAGNESIUM mg/dL  --  2.2  --   --   --      Results from last 7 days   Lab Units 01/11/24  1409   AST U/L 24   ALT U/L 11   ALK PHOS U/L 56   TOTAL PROTEIN g/dL 7.5   ALBUMIN g/dL 3.9   TOTAL BILIRUBIN mg/dL 0.54        Results from last 7 days   Lab Units 01/13/24  0509 01/12/24  0520 01/11/24  1409   GLUCOSE RANDOM mg/dL 92 87 118        Results from last 7 days   Lab Units 01/12/24  0010   CLARITY UA  Clear   COLOR UA  Yellow   SPEC GRAV UA  1.015   PH UA  6.0   GLUCOSE UA mg/dl Negative   KETONES UA mg/dl Negative   BLOOD UA  Negative   PROTEIN UA mg/dl Negative   NITRITE UA  Positive*   BILIRUBIN UA  Negative   UROBILINOGEN UA E.U./dl 0.2   LEUKOCYTES UA  Negative   WBC UA /hpf 0-1*   RBC UA /hpf None Seen   BACTERIA UA /hpf Moderate*   EPITHELIAL CELLS WET PREP /hpf Occasional   SODIUM UR  103   CREATININE UR mg/dL 42.5              Date/Time Order Dose Route Action     01/11/2024 1448 EST sodium chloride 0.9 % bolus 1,000 mL 1,000 mL Intravenous New Bag            Admitting Diagnosis: Abnormal laboratory test result [R89.9]  MICHAEL (acute kidney injury) (HCC) [N17.9]    Age/Sex: 88 y.o. female    Admission Orders: SCDs; I/O; renal diet; fluid restriction 1800    Scheduled Medications:  apixaban, 2.5 mg, Oral, BID  oxybutynin, 10 mg, Oral, HS  pravastatin, 40 mg, Oral, Daily  timolol, 1 drop, Left Eye,  Daily      Continuous IV Infusions:  sodium chloride, 75 mL/hr, Intravenous, Continuous      PRN Meds:  acetaminophen, 650 mg, Oral, Q6H PRN  albuterol, 2 puff, Inhalation, Q6H PRN  ondansetron, 4 mg, Intravenous, Q6H PRN        Network Utilization Review Department  ATTENTION: Please call with any questions or concerns to [phone] and carefully listen to the prompts so that you are directed to the right person. All voicemails are confidential.   For Discharge needs, contact Care Management DC Support Team at [phone] opt. 2  Send all requests for admission clinical reviews, approved or denied determinations and any other requests to dedicated fax number below belonging to the campus where the patient is receiving treatment. List of dedicated fax numbers for the Facilities:  FACILITY NAME UR FAX NUMBER   ADMISSION DENIALS (Administrative/Medical Necessity) [phone]   DISCHARGE SUPPORT TEAM (NETWORK) [phone]   PARENT CHILD HEALTH (Maternity/NICU/Pediatrics) [phone]   [facility] [phone]   [facility] [phone]   [facility] [phone]   [facility] [phone]   [facility] [phone]   [facility] [phone]   [facility] [phone]   [facility] [phone]   [facility] [phone]   [facility] [phone]   [facility] [phone]

## 2024-01-12 NOTE — OCCUPATIONAL THERAPY NOTE
"    Occupational Therapy Evaluation     Patient Name: Fang Victor  Today's Date: 1/12/2024  Problem List  Principal Problem:    Acute kidney injury superimposed on chronic kidney disease   Active Problems:    DVT (deep venous thrombosis) (HCC)    ILD (interstitial lung disease) (HCC)    HTN (hypertension)    Lymphedema    Past Medical History  Past Medical History:   Diagnosis Date    Chronic pulmonary embolism (HCC)     COPD (chronic obstructive pulmonary disease) (HCC)     Edema     Hyperlipidemia     Hypertension      Past Surgical History  History reviewed. No pertinent surgical history.      01/12/24 0915   OT Last Visit   OT Visit Date 01/12/24   Note Type   Note type Evaluation   Pain Assessment   Pain Assessment Tool 0-10   Pain Score No Pain   Home Living   Type of Home Assisted living  ([place])   Home Layout One level;Performs ADLs on one level;Able to live on main level with bedroom/bathroom   Bathroom Shower/Tub Walk-in shower   Bathroom Toilet Raised   Bathroom Equipment Grab bars in shower;Shower chair;Grab bars around toilet   Bathroom Accessibility Accessible   Home Equipment Walker;Wheelchair-manual   Prior Function   Level of [place] Independent with functional mobility;Needs assistance with ADLs;Needs assistance with IADLS   Lives With Facility staff   Receives Help From Personal care attendant   IADLs Family/Friend/Other provides transportation;Family/Friend/Other provides meals;Family/Friend/Other provides medication management   Falls in the last 6 months 0   Vocational Retired   Comments Pt reports A for LB ADL tasks, and IADLs; functional mobility with RW at Mod I level for short distances, Mod I with WC for long distances.   Subjective   Subjective \"I was doing all of this myself but  I don't think I can now.\"   ADL   Where Assessed Other (Comment)  (EOB, toilet)   LB Bathing Assistance 3  Moderate Assistance   LB Bathing Deficit Buttocks   UB Dressing Assistance 4  Minimal " Assistance   UB Dressing Deficit Thread RUE;Thread LUE;Fasteners;Other (Comment)  (to don hospital gown)   LB Dressing Assistance 3  Moderate Assistance   LB Dressing Deficit Thread RLE into underwear;Thread LLE into underwear;Pull up over hips;Steadying;Requires assistive device for steadying   Toileting Assistance  3  Moderate Assistance   Toileting Deficit Grab bar use;Clothing management up;Clothing management down;Perineal hygiene;Steadying   Bed Mobility   Supine to Sit   (SBA)   Additional items Increased time required;Bedrails;HOB elevated;Verbal cues   Additional Comments Pt sleeps in adjustable bed at baseline. Pt OOB in chair at end of session, all needs within reach.   Transfers   Sit to Stand   (steadying A)   Additional items Increased time required;Verbal cues   Stand to Sit   (steadying A)   Additional items Increased time required;Verbal cues   Stand pivot   (steadying A)   Additional items Increased time required;Verbal cues   Additional Comments RW used, verbal cues for safe techniques   Functional Mobility   Functional Mobility   (steadying A)   Additional Comments Pt ambulates to/from restroom in her room using RW, steadying A for safety. Pt requires increased time due to slow pace.   Additional items Rolling walker   Activity Tolerance   Activity Tolerance Patient limited by fatigue   Medical Staff Made Aware nurse aware   RUE Assessment   RUE Assessment WFL   LUE Assessment   LUE Assessment WFL   Hand Function   Gross Motor Coordination Functional   Fine Motor Coordination Functional   Sensation   Light Touch No apparent deficits   Sharp/Dull No apparent deficits   Vision-Basic Assessment   Current Vision Wears glasses all the time   Psychosocial   Psychosocial (WDL) WDL   Cognition   Overall Cognitive Status WFL   Arousal/Participation Alert;Cooperative   Attention Within functional limits   Orientation Level Oriented X4   Memory Within functional limits   Following Commands Follows one step  commands without difficulty   Assessment   Limitation Decreased ADL status;Decreased UE strength;Decreased Safe judgement during ADL;Decreased endurance;Decreased self-care trans   Prognosis Fair   Assessment Pt is a 88 y.o. female seen for OT evaluation s/p admit to  [facility]  on 1/11/2024 w/ Acute kidney injury superimposed on chronic kidney disease .  Comorbidities affecting pt's functional performance at time of assessment include: HTN, COPD, and hyperlipidemia . Personal factors affecting pt at time of IE include:difficulty performing ADLS and health management . Prior to admission, pt was reportedly requires minimal assistance for ADL tasks, Mod I for functional mobility. Upon evaluation: Pt requires Mod A for LB ADLs, Min A for UB ADLs 2* the following deficits impacting occupational performance: decreased strength, decreased balance, decreased tolerance, and decreased safety awareness. Pt on RA during session with SPO2 remaining 90% or greater. Pt to benefit from continued skilled OT tx while in the hospital to address deficits as defined above and maximize level of functional independence w ADL's and functional mobility. Occupational Performance areas to address include: bathing/shower, toilet hygiene, dressing, health maintenance, functional mobility, and clothing management. The patient's raw score on the -PAC Daily Activity Inpatient Short Form is 16. A raw score of less than 19 suggests the patient may benefit from discharge to post-acute rehabilitation services. Discharge recommendation at this time is level level 2 (moderate resource intensity).  Pt benefited from co-evaluation of skilled OT and PT therapists in order to most appropriately address functional deficits d/t extensive assistance required for safe functional mobility, decreased activity tolerance, and regression from functioning level prior to admission and/or onset of present illness. OT/PT objectives were addressed separately; please see PT  note for specific goal areas targeted.   Goals   Patient Goals to go home   Short Term Goal #1 Pt will complete UB/LB bathing and dressing tasks with SPV.   Short Term Goal #2 Pt will complete toileting tasks with SPV.   Short Term Goal  Pt will complete all functional transfers and mobility with SPV.   Long Term Goal #1 Pt will complete B UE strengthening exercises and demo carryover of HEP.   Plan   Treatment Interventions ADL retraining;UE strengthening/ROM;Endurance training;Functional transfer training;Compensatory technique education;Energy conservation;Activityengagement   Goal Expiration Date 01/26/24   OT Frequency 3-5x/wk   Discharge Recommendation   Rehab Resource Intensity Level, OT II (Moderate Resource Intensity)   AM-PAC Daily Activity Inpatient   Lower Body Dressing 2   Bathing 2   Toileting 2   Upper Body Dressing 3   Grooming 3   Eating 4   Daily Activity Raw Score 16   Daily Activity Standardized Score (Calc for Raw Score >=11) 35.96   AM-PAC Applied Cognition Inpatient   Following a Speech/Presentation 4   Understanding Ordinary Conversation 4   Taking Medications 3   Remembering Where Things Are Placed or Put Away 3   Remembering List of 4-5 Errands 3   Taking Care of Complicated Tasks 3   Applied Cognition Raw Score 20   Applied Cognition Standardized Score 41.76     Pt will benefit from continued OT services in order to maximize (I) c ADL performance, FM c RW, and improve overall endurance/strength required to complete functional tasks in preparation for d/c.    Pt benefited from co-evaluation of skilled OT and PT therapists in order to most appropriately address functional deficits d/t extensive assistance required for safe functional mobility, decreased activity tolerance, and regression from functioning level prior to admission and/or onset of present illness. OT/PT objectives were addressed separately; please see PT note for specific goal areas targeted.     Pt left seated in chair at end  of session; all needs within reach; all lines intact.

## 2024-01-12 NOTE — PROGRESS NOTES
[facility]  Progress Note  Name: Fang Victor I  MRN: [id number]  Unit/Bed#: [facility] I Date of Admission: 1/11/2024   Date of Service: 1/12/2024 I Hospital Day: 1    Assessment/Plan   * Acute kidney injury superimposed on chronic kidney disease   Assessment & Plan  Lab Results   Component Value Date    EGFR 24 01/12/2024    EGFR 18 01/11/2024    EGFR 33 08/31/2022    CREATININE 1.83 (H) 01/12/2024    CREATININE 2.33 (H) 01/11/2024    CREATININE 1.44 (H) 08/31/2022   Suspect prerenal secondary to having nausea vomiting poor p.o. intake on top of being on lisinopril and Lasix repeat hyperkalemia potassium is normal.  Looks like creatinine baseline is overall 1.3-1.4  Given a liter of fluids will hydrate with normal saline 75 mill per hour patient has Rales but this is compliant with her interstitial lung disease sounds. Cr improved will hydrate for another day   I's and O's will bladder scan ultrasound of the kidney and bladder  Obtain UA Is with uti  hypotension SBP less than 130 avoid IV contrast and NSAIDs  I's and O's  Obtain urine creatinine and urine sodium urine urea awaiting results  Bmp in am  Us kidney and bladder - no obstruction    Lymphedema  Assessment & Plan  Chronic per patient [facility]  As joselito needing hydration will hold lasix     HTN (hypertension)  Assessment & Plan  Hold lisinopril in light of joselito  Bp stable avoid <130    ILD (interstitial lung disease) ([facility])  Assessment & Plan  Hence with rales   EVIDENT ON CT CHEST   Referral seen to be made to pulmonary follow up once discharged     DVT (deep venous thrombosis) ([facility])  Assessment & Plan  Hx of  continue eliquis                 VTE Pharmacologic Prophylaxis: VTE Score: 3 Moderate Risk (Score 3-4) - Pharmacological DVT Prophylaxis Ordered: apixaban (Eliquis).    Mobility:   Basic Mobility Inpatient Raw Score: 16  JH-HLM Goal: 5: Stand one or more mins  JH-HLM Achieved: 6: Walk 10 steps or more  HLM Goal achieved.  Continue to encourage appropriate mobility.    Patient Centered Rounds: I performed bedside rounds with nursing staff today.   Discussions with Specialists or Other Care Team Provider: none    Education and Discussions with Family / Patient: pt will update sister     Total Time Spent on Date of Encounter in care of patient: >35 mins. This time was spent on one or more of the following: performing physical exam; counseling and coordination of care; obtaining or reviewing history; documenting in the medical record; reviewing/ordering tests, medications or procedures; communicating with other healthcare professionals and discussing with patient's family/caregivers.    Current Length of Stay: 1 day(s)  Current Patient Status: Inpatient   Certification Statement: The patient will continue to require additional inpatient hospital stay due to joselito  Discharge Plan: Anticipate discharge tomorrow to home with home services.    Code Status: Level 3 - DNAR and DNI    Subjective:   Seen and examined gerd type symptoms    Objective:     Vitals:   Temp (24hrs), Av.7 °F (36.5 °C), Min:97.3 °F (36.3 °C), Max:98.5 °F (36.9 °C)    Temp:  [97.3 °F (36.3 °C)-98.5 °F (36.9 °C)] 97.3 °F (36.3 °C)  HR:  [66-84] 66  Resp:  [17-19] 18  BP: (115-144)/(48-87) 115/68  SpO2:  [92 %-97 %] 94 %  Body mass index is 47.5 kg/m².     Input and Output Summary (last 24 hours):     Intake/Output Summary (Last 24 hours) at 2024 1231  Last data filed at 2024 0849  Gross per 24 hour   Intake 1300 ml   Output 1950 ml   Net -650 ml       Physical Exam:   Physical Exam  Vitals and nursing note reviewed.   Constitutional:       General: She is not in acute distress.     Appearance: She is well-developed.   HENT:      Head: Normocephalic and atraumatic.   Eyes:      Conjunctiva/sclera: Conjunctivae normal.   Cardiovascular:      Rate and Rhythm: Normal rate and regular rhythm.      Heart sounds: No murmur heard.  Pulmonary:      Effort: Pulmonary  effort is normal. No respiratory distress.      Breath sounds: Rales (scattered rales) present. No wheezing.   Abdominal:      General: There is no distension.      Palpations: Abdomen is soft.      Tenderness: There is no abdominal tenderness.   Musculoskeletal:         General: Swelling present.      Cervical back: Neck supple.   Skin:     General: Skin is warm and dry.      Capillary Refill: Capillary refill takes less than 2 seconds.   Neurological:      Mental Status: She is alert and oriented to person, place, and time.   Psychiatric:         Mood and Affect: Mood normal.          Additional Data:     Labs:  Results from last 7 days   Lab Units 01/12/24  0520 01/11/24  1409   WBC Thousand/uL 7.79 9.24   HEMOGLOBIN g/dL 11.5 12.8   HEMATOCRIT % 36.4 39.7   PLATELETS Thousands/uL 178 239   NEUTROS PCT %  --  62   LYMPHS PCT %  --  24   MONOS PCT %  --  8   EOS PCT %  --  4     Results from last 7 days   Lab Units 01/12/24  0520 01/11/24  1515 01/11/24  1409   SODIUM mmol/L 139  --  136   POTASSIUM mmol/L 4.9   < > 5.9*   CHLORIDE mmol/L 112*  --  106   CO2 mmol/L 18*  --  20*   BUN mg/dL 64*  --  76*   CREATININE mg/dL 1.83*  --  2.33*   ANION GAP mmol/L 9  --  10   CALCIUM mg/dL 8.4  --  8.9   ALBUMIN g/dL  --   --  3.9   TOTAL BILIRUBIN mg/dL  --   --  0.54   ALK PHOS U/L  --   --  56   ALT U/L  --   --  11   AST U/L  --   --  24   GLUCOSE RANDOM mg/dL 87  --  118    < > = values in this interval not displayed.                       Lines/Drains:  Invasive Devices       Peripheral Intravenous Line  Duration             Peripheral IV 01/11/24 Right Antecubital <1 day                          Imaging: Reviewed radiology reports from this admission including: ultrasound(s)    Recent Cultures (last 7 days):         Last 24 Hours Medication List:   Current Facility-Administered Medications   Medication Dose Route Frequency Provider Last Rate    acetaminophen  650 mg Oral Q6H PRN Nicole Galeana MD      albuterol   2 puff Inhalation Q6H PRN Nicole Galeana MD      apixaban  2.5 mg Oral BID Nicole Galeana MD      levofloxacin  750 mg Oral Every Other Day Nicole Galeana MD      ondansetron  4 mg Intravenous Q6H PRN Nicole Galeana MD      oxybutynin  10 mg Oral HS Nicole Galeana MD      pantoprazole  40 mg Oral Early Morning Nicole Galeana MD      pravastatin  40 mg Oral Daily Nicole Galeana MD      sodium chloride  75 mL/hr Intravenous Continuous Nicole Galeana MD 75 mL/hr (01/12/24 0515)    timolol  1 drop Left Eye Daily Nicole Galeana MD          Today, Patient Was Seen By: Nicole Galeana MD    **Please Note: This note may have been constructed using a voice recognition system.**

## 2024-01-12 NOTE — PLAN OF CARE
Problem: Potential for Falls  Goal: Patient will remain free of falls  Description: INTERVENTIONS:  - Educate patient/family on patient safety including physical limitations  - Instruct patient to call for assistance with activity   - Consult OT/PT to assist with strengthening/mobility   - Keep Call bell within reach  - Keep bed low and locked with side rails adjusted as appropriate  - Keep care items and personal belongings within reach  - Initiate and maintain comfort rounds  - Make Fall Risk Sign visible to staff  - Offer Toileting every 2 Hours, in advance of need  - Initiate/Maintain bed/chair alarm  - Obtain necessary fall risk management equipment: alarms  - Apply yellow socks and bracelet for high fall risk patients  - Consider moving patient to room near nurses station  Outcome: Progressing     Problem: PAIN - ADULT  Goal: Verbalizes/displays adequate comfort level or baseline comfort level  Description: Interventions:  - Encourage patient to monitor pain and request assistance  - Assess pain using appropriate pain scale  - Administer analgesics based on type and severity of pain and evaluate response  - Implement non-pharmacological measures as appropriate and evaluate response  - Consider cultural and social influences on pain and pain management  - Notify physician/advanced practitioner if interventions unsuccessful or patient reports new pain  Outcome: Progressing     Problem: INFECTION - ADULT  Goal: Absence or prevention of progression during hospitalization  Description: INTERVENTIONS:  - Assess and monitor for signs and symptoms of infection  - Monitor lab/diagnostic results  - Monitor all insertion sites, i.e. indwelling lines, tubes, and drains  - Monitor endotracheal if appropriate and nasal secretions for changes in amount and color  - [place] appropriate cooling/warming therapies per order  - Administer medications as ordered  - Instruct and encourage patient and family to use good hand  hygiene technique  - Identify and instruct in appropriate isolation precautions for identified infection/condition  Outcome: Progressing     Problem: SAFETY ADULT  Goal: Patient will remain free of falls  Description: INTERVENTIONS:  - Educate patient/family on patient safety including physical limitations  - Instruct patient to call for assistance with activity   - Consult OT/PT to assist with strengthening/mobility   - Keep Call bell within reach  - Keep bed low and locked with side rails adjusted as appropriate  - Keep care items and personal belongings within reach  - Initiate and maintain comfort rounds  - Make Fall Risk Sign visible to staff  - Offer Toileting every 2 Hours, in advance of need  - Initiate/Maintain bed/chair alarm  - Obtain necessary fall risk management equipment: alarms  - Apply yellow socks and bracelet for high fall risk patients  - Consider moving patient to room near nurses station  Outcome: Progressing  Goal: Maintain or return to baseline ADL function  Description: INTERVENTIONS:  -  Assess patient's ability to carry out ADLs; assess patient's baseline for ADL function and identify physical deficits which impact ability to perform ADLs (bathing, care of mouth/teeth, toileting, grooming, dressing, etc.)  - Assess/evaluate cause of self-care deficits   - Assess range of motion  - Assess patient's mobility; develop plan if impaired  - Assess patient's need for assistive devices and provide as appropriate  - Encourage maximum independence but intervene and supervise when necessary  - Involve family in performance of ADLs  - Assess for home care needs following discharge   - Consider OT consult to assist with ADL evaluation and planning for discharge  - Provide patient education as appropriate  Outcome: Progressing  Goal: Maintains/Returns to pre admission functional level  Description: INTERVENTIONS:  - Perform AM-PAC 6 Click Basic Mobility/ Daily Activity assessment daily.  - Set and  communicate daily mobility goal to care team and patient/family/caregiver.   - Collaborate with rehabilitation services on mobility goals if consulted  - Perform Range of Motion TID times a day.  - Reposition patient every 2 hours.  - Dangle patient TID times a day  - Stand patient TID times a day  - Ambulate patient TId times a day  - Out of bed to chair TID times a day   - Out of bed for meals TID times a day  - Out of bed for toileting  - Record patient progress and toleration of activity level   Outcome: Progressing     Problem: DISCHARGE PLANNING  Goal: Discharge to home or other facility with appropriate resources  Description: INTERVENTIONS:  - Identify barriers to discharge w/patient and caregiver  - Arrange for needed discharge resources and transportation as appropriate  - Identify discharge learning needs (meds, wound care, etc.)  - Arrange for interpretive services to assist at discharge as needed  - Refer to Case Management Department for coordinating discharge planning if the patient needs post-hospital services based on physician/advanced practitioner order or complex needs related to functional status, cognitive ability, or social support system  Outcome: Progressing     Problem: Knowledge Deficit  Goal: Patient/family/caregiver demonstrates understanding of disease process, treatment plan, medications, and discharge instructions  Description: Complete learning assessment and assess knowledge base.  Interventions:  - Provide teaching at level of understanding  - Provide teaching via preferred learning methods  Outcome: Progressing     Problem: METABOLIC, FLUID AND ELECTROLYTES - ADULT  Goal: Electrolytes maintained within normal limits  Description: INTERVENTIONS:  - Monitor labs and assess patient for signs and symptoms of electrolyte imbalances  - Administer electrolyte replacement as ordered  - Monitor response to electrolyte replacements, including repeat lab results as appropriate  - Instruct  patient on fluid and nutrition as appropriate  Outcome: Progressing     Problem: Prexisting or High Potential for Compromised Skin Integrity  Goal: Skin integrity is maintained or improved  Description: INTERVENTIONS:  - Identify patients at risk for skin breakdown  - Assess and monitor skin integrity  - Assess and monitor nutrition and hydration status  - Monitor labs   - Assess for incontinence   - Turn and reposition patient  - Assist with mobility/ambulation  - Relieve pressure over bony prominences  - Avoid friction and shearing  - Provide appropriate hygiene as needed including keeping skin clean and dry  - Evaluate need for skin moisturizer/barrier cream  - Collaborate with interdisciplinary team   - Patient/family teaching  - Consider wound care consult   Outcome: Progressing

## 2024-01-12 NOTE — PLAN OF CARE
Problem: PHYSICAL THERAPY ADULT  Goal: Performs mobility at highest level of function for planned discharge setting.  See evaluation for individualized goals.  Description: Treatment/Interventions: Functional transfer training, LE strengthening/ROM, Therapeutic exercise, Endurance training, Patient/family training, Equipment eval/education, Bed mobility, Gait training, Compensatory technique education, Spoke to nursing, OT  Equipment Recommended:  (pt owns RW)       See flowsheet documentation for full assessment, interventions and recommendations.  1/12/2024 1152 by Ssi Colón PT  Note: Prognosis: Good  Problem List: Decreased strength, Decreased endurance, Impaired balance, Decreased mobility, Decreased safety awareness, Obesity  Assessment: Pt is a 88 y.o. female seen for PT evaluation s/p admission to [facility] on 1/11/2024 with Acute kidney injury superimposed on chronic kidney disease .  Order placed for PT services.  Upon evaluation: Pt is presenting with impaired functional mobility due to decreased strength, decreased endurance, impaired balance, gait deviations, decreased safety awareness, and fall risk requiring  SBA assistance for bed mobility and steadying assistance for transfers and ambulation with RW . Pt's clinical presentation is currently unstable/unpredictable given the functional mobility deficits above coupled with fall risks as indicated by AM-PAC 6-Clicks: 16/24 as well as impaired balance, polypharmacy, decreased safety awareness, and obesity and combined with medical complications of abnormal renal lab values, abnormal CO2 values, and need for input for mobility technique/safety.  Pt's PMHx and comorbidities that may affect physical performance and progress include: COPD, CKD, h/o DVT, h/o PE, HTN, and obesity. Personal factors affecting pt at time of IE include: advanced age, inability to perform IADLs, inability to perform ADLs, inability to navigate level  surfaces without external assistance, inability to ambulate household distances, and inability to navigate community distances. Pt will benefit from continued skilled PT services to address deficits as defined above and to maximize level of functional mobility to facilitate return toward PLOF and improved QOL. From PT/mobility standpoint, recommendation at time of d/c would be Level II (Moderate Resource Intensity pending progress in order to reduce fall risk and maximize pt's functional independence and consistency with mobility in order to facilitate return to PLOF.  Recommend trial with walker next 1-2 sessions and ther ex next 1-2 sessions.     Barriers to Discharge Comments: Pt resides in [facility], requires increased assistance for mobility  Rehab Resource Intensity Level, PT: II (Moderate Resource Intensity)    See flowsheet documentation for full assessment.

## 2024-01-12 NOTE — SPEECH THERAPY NOTE
Speech Language/Pathology  Speech-Language Pathology Bedside Swallow Evaluation      Patient Name: Fang Victor    Today's Date: 1/12/2024       Summary   Consult received for bedside swallow assessment secondary to coughing w/ breakfast. Pt admitted w/ MICHAEL, CKD, and lymphedema. PMHx includes ILD, HTN, DVT.   Pt overall denies dysphagia, reports sometimes she doesn't chew enough when she is in rush. Endorses coughing w/ liquids intermittently but reports it is not of concern. Currently on regular textures w/ thin liquids. On room air, initially reclined in chair. SLP assisted to upright positioning. Denies difficulty w/ pills.  Seen w/ regular texture lunch of salmon, veggies and thin liquids. Set up assistance provided. Verbose during intake. Slightly prolonged mastication but adequate clearance. Swallow prompt and no overt s/s aspiration w/ solids or liquids.    Recommend to continue regular textures w/ thin liquids  Meds whole as tolerated  Education provided to pt to decrease rate and verbosity while eating/drinking. Pt expressed understanding  SLP will s/o; please reconsult as needed    Risk/s for Aspiration: Low     Recommended Diet: regular diet and thin liquids   Recommended Form of Meds: whole with liquid   Aspiration precautions and swallowing strategies: upright posture, slow rate of feeding, small bites/sips, and quiet environment (tv off, limit talking, door closed, etc.)  Other Recommendations: Continue frequent oral care        Current Medical:  Fang Victor is a 88 y.o. female with a PMH of interstitial fibrosis who presents with acute kidney injury hyperkalemia patient reported on Sunday she had nausea and vomiting has not been eating that has resolved no abdominal pain currently no diarrhea no chest pain or shortness of breath.  Denies any dysuria states has been urinating.    Current Precautions:  Fall      Allergies:  No known food allergies    Past medical history:  Please see H&P for  details    Special Studies:  CT Chest 11/20/23  Stable unclassifiable pattern of pulmonary fibrosis with multifocal reticulation, groundglass opacity, parenchymal bands, and traction bronchiectasis/bronchiolectasis, greatest in the left upper lobe.  Pulmonary artery enlargement which can be seen with pulmonary hypertension.       Social/Education/Vocational Hx:  Pt lives in assisted living facility    Swallow Information   Current Risks for Dysphagia & Aspiration:  coughing w/ intake  Current Symptoms/Concerns: coughing with po  Current Diet: regular diet and thin liquids   Baseline Diet: regular diet and thin liquids      Baseline Assessment   Behavior/Cognition: alert  Speech/Language Status: able to participate in conversation  Patient Positioning: upright in recliner  Pain Status/Interventions/Response to Interventions:   No report of or nonverbal indications of pain.       Swallow Mechanism Exam  Facial: symmetrical  Labial: WFL  Lingual: WFL  Velum: symmetrical  Mandible: adequate ROM  Dentition: adequate  Vocal quality:clear/adequate   Volitional Cough: strong/productive   Respiratory Status: on RA        Consistencies Assessed and Performance   Consistencies Administered: thin liquids, soft solids, and hard solids    Oral Stage: WFL  Mastication was slightly prolonged  Bolus formation and transfer were functional with no significant oral residue noted.  No overt s/s reduced oral control.    Pharyngeal Stage: WFL  Swallow Mechanics:  Swallowing initiation appeared prompt.  Laryngeal rise was palpated and judged to be within functional limits.  No coughing, throat clearing, change in vocal quality or respiratory status noted today.     Esophageal Concerns: none reported    Summary and Recommendations (see above)    Results Reviewed with: patient and MD     Treatment Recommended: None at this time     Nora Atwood MS CCC-SLP  1/12/2024

## 2024-01-12 NOTE — CASE MANAGEMENT
Case Management Discharge Planning Note    Patient name Fang Victor  Location /-01 MRN [id number]  : 1935 Date 2024       Current Admission Date: 2024  Current Admission Diagnosis:Acute kidney injury superimposed on chronic kidney disease    Patient Active Problem List    Diagnosis Date Noted    Acute kidney injury superimposed on chronic kidney disease  2024    DVT (deep venous thrombosis) ([facility]) 2024    ILD (interstitial lung disease) ([facility]) 2024    HTN (hypertension) 2024    Lymphedema 2024      LOS (days): 1  Geometric Mean LOS (GMLOS) (days): 3.1  Days to GMLOS:2.2     OBJECTIVE:  Risk of Unplanned Readmission Score: 9.59         Current admission status: Inpatient   Preferred Pharmacy:   PATIENT/FAMILY REPORTS NO PREFERRED PHARMACY  No address on file      Primary Care Provider: Simba Jameson MD    Primary Insurance: [facility] REP  Secondary Insurance:     DISCHARGE DETAILS:                 [facility] has been recommended for pt at time of discharge, CM placing blanket referrals to [facility] in [place].            [facility] has [facility] pt at time of discharge.

## 2024-01-12 NOTE — PHYSICAL THERAPY NOTE
PHYSICAL THERAPY EVALUATION  NAME:  Fang Victor  DATE: 01/12/24    AGE:   88 y.o.  Mrn:   [id number]  ADMIT DX:  Abnormal laboratory test result [R89.9]  MICHAEL (acute kidney injury) (HCC) [N17.9]    Past Medical History:   Diagnosis Date    Chronic pulmonary embolism (HCC)     COPD (chronic obstructive pulmonary disease) (HCC)     Edema     Hyperlipidemia     Hypertension      Length Of Stay: 1  Performed at least 2 patient identifiers during session: Name and Birthday  PHYSICAL THERAPY EVALUATION :        01/12/24 0849   Note Type   Note type Evaluation   Pain Assessment   Pain Assessment Tool 0-10   Pain Score No Pain   Restrictions/Precautions   Other Precautions Chair Alarm;Bed Alarm;Multiple lines;Fall Risk   Home Living   Type of Home Assisted living  ([place])   Home Layout One level;Performs ADLs on one level;Able to live on main level with bedroom/bathroom   Bathroom Shower/Tub Walk-in shower   Bathroom Toilet Raised   Bathroom Equipment Grab bars in shower;Shower chair;Grab bars around toilet   Home Equipment Walker;Wheelchair-manual   Additional Comments Pt reports living at [facility], ambulates short distances with RW at [facility], W/C for longer distances   Prior Function   Level of [place] Independent with functional mobility;Needs assistance with ADLs;Needs assistance with IADLS   Lives With Facility staff   Falls in the last 6 months 0   Comments Pt reports completing mobility with RW or W/C at [facility], has assistance with ADLs and IADLs   Cognition   Overall Cognitive Status WFL   Orientation Level Oriented X4   Following Commands Follows one step commands without difficulty   RLE Assessment   RLE Assessment WFL  (grossly assessed at least 3+/5 strength)   LLE Assessment   LLE Assessment WFL  (grossly assessed at least 3+/5 strength)   Bed Mobility   Supine to Sit   (SBA)   Additional items Increased time required;Verbal cues   Additional Comments HOB elevated as pt reports using adjustable  bed, SBA with VC for technique and scooting to EOB   Transfers   Sit to Stand   (steadying assist)   Additional items Increased time required;Verbal cues   Stand to Sit   (steadying assist)   Additional items Increased time required;Verbal cues   Stand pivot   (steadying assist)   Additional items Increased time required;Verbal cues   Additional Comments RW and steadying assisit for transfers with VC for hand placement, encouragement required   Ambulation/Elevation   Gait pattern Improper Weight shift;Wide EMILIE;Decreased foot clearance;Foward flexed;Short stride;Excessively slow   Gait Assistance   (steadying assist)   Additional items Verbal cues   Assistive Device Rolling walker   Distance 10' x 2 with RW and steadying assist, VC for improved upright posture as pt with significant forward flexion, limited by fatigue   Balance   Static Sitting Fair +   Dynamic Sitting Fair   Static Standing Fair   Dynamic Standing Fair -   Ambulatory Fair -   Endurance Deficit   Endurance Deficit Yes   Endurance Deficit Description fatigue   Activity Tolerance   Activity Tolerance Patient limited by fatigue   Medical Staff Made Aware OTSarahi   Nurse Made Aware RN Donna   Assessment   Prognosis Good   Problem List Decreased strength;Decreased endurance;Impaired balance;Decreased mobility;Decreased safety awareness;Obesity   Barriers to Discharge Comments [facility], requires increased assistance for mobility   Goals   Patient Goals Get better   RUST Expiration Date 01/26/24   Plan   Treatment/Interventions Functional transfer training;LE strengthening/ROM;Therapeutic exercise;Endurance training;Patient/family training;Equipment eval/education;Bed mobility;Gait training;Compensatory technique education;Spoke to nursing;OT   PT Frequency 3-5x/wk   Discharge Recommendation   Rehab Resource Intensity Level, PT II (Moderate Resource Intensity)   Equipment Recommended   (pt owns RW)   AM-PAC Basic Mobility Inpatient   Turning in  Flat Bed Without Bedrails 3   Lying on Back to Sitting on Edge of Flat Bed Without Bedrails 3   Moving Bed to Chair 3   Standing Up From Chair Using Arms 3   Walk in Room 3   Climb 3-5 Stairs With Railing 1   Basic Mobility Inpatient Raw Score 16   Basic Mobility Standardized Score 38.32   Highest Level Of Mobility   -HL Goal 5: Stand one or more mins   JH-HLM Achieved 6: Walk 10 steps or more   End of Consult   Patient Position at End of Consult Bedside chair;Bed/Chair alarm activated;All needs within reach     The patient's AM-PAC Basic Mobility Inpatient Short Form Raw Score is 16  . A Raw score of less than or equal to 16 suggests the patient may benefit from discharge to post-acute rehabilitation services. Please also refer to the recommendation of the Physical Therapist for safe discharge planning.    (Please find full objective findings from PT assessment regarding body systems outlined above).     Assessment: Pt is a 88 y.o. female seen for PT evaluation s/p admission to [facility] on 1/11/2024 with Acute kidney injury superimposed on chronic kidney disease .  Order placed for PT services.  Upon evaluation: Pt is presenting with impaired functional mobility due to decreased strength, decreased endurance, impaired balance, gait deviations, decreased safety awareness, and fall risk requiring  SBA assistance for bed mobility and steadying assistance for transfers and ambulation with RW . Pt's clinical presentation is currently unstable/unpredictable given the functional mobility deficits above coupled with fall risks as indicated by AM-PAC 6-Clicks: 16/24 as well as impaired balance, polypharmacy, decreased safety awareness, and obesity and combined with medical complications of abnormal renal lab values, abnormal CO2 values, and need for input for mobility technique/safety.  Pt's PMHx and comorbidities that may affect physical performance and progress include: COPD, CKD, h/o DVT, h/o PE,  HTN, and obesity. Personal factors affecting pt at time of IE include: advanced age, inability to perform IADLs, inability to perform ADLs, inability to navigate level surfaces without external assistance, inability to ambulate household distances, and inability to navigate community distances. Pt will benefit from continued skilled PT services to address deficits as defined above and to maximize level of functional mobility to facilitate return toward PLOF and improved QOL. From PT/mobility standpoint, recommendation at time of d/c would be Level II (Moderate Resource Intensity pending progress in order to reduce fall risk and maximize pt's functional independence and consistency with mobility in order to facilitate return to PLOF.  Recommend trial with walker next 1-2 sessions and ther ex next 1-2 sessions.      Goals: Pt will: Perform bed mobility tasks with modified I to reposition in bed and prepare for transfers. Pt will perform transfers with modified I to increase Indep in home environment and prepare for ambulation. Pt will ambulate with RW for >/= 25' with  modified I  to improve gait quality and promote proper use of assistive device and to access home environment. Increase bilateral LE strength 1/2 grade to facilitate independent mobility and Increase all balance 1/2 grade to decrease risk for falls.      Sis Colón, PT,DPT

## 2024-01-13 VITALS
DIASTOLIC BLOOD PRESSURE: 54 MMHG | RESPIRATION RATE: 19 BRPM | BODY MASS INDEX: 47.71 KG/M2 | HEIGHT: 58 IN | OXYGEN SATURATION: 93 % | SYSTOLIC BLOOD PRESSURE: 129 MMHG | WEIGHT: 227.29 LBS | HEART RATE: 66 BPM | TEMPERATURE: 97.5 F

## 2024-01-13 LAB
ANION GAP SERPL CALCULATED.3IONS-SCNC: 7 MMOL/L
BUN SERPL-MCNC: 48 MG/DL (ref 5–25)
CALCIUM SERPL-MCNC: 8.5 MG/DL (ref 8.4–10.2)
CHLORIDE SERPL-SCNC: 112 MMOL/L (ref 96–108)
CO2 SERPL-SCNC: 20 MMOL/L (ref 21–32)
CREAT SERPL-MCNC: 1.48 MG/DL (ref 0.6–1.3)
GFR SERPL CREATININE-BSD FRML MDRD: 31 ML/MIN/1.73SQ M
GLUCOSE SERPL-MCNC: 92 MG/DL (ref 65–140)
MRSA NOSE QL CULT: NORMAL
POTASSIUM SERPL-SCNC: 4.6 MMOL/L (ref 3.5–5.3)
SODIUM SERPL-SCNC: 139 MMOL/L (ref 135–147)

## 2024-01-13 PROCEDURE — 99239 HOSP IP/OBS DSCHRG MGMT >30: CPT | Performed by: FAMILY MEDICINE

## 2024-01-13 PROCEDURE — 80048 BASIC METABOLIC PNL TOTAL CA: CPT | Performed by: FAMILY MEDICINE

## 2024-01-13 RX ORDER — FUROSEMIDE 40 MG/1
40 TABLET ORAL DAILY
Start: 2024-01-13

## 2024-01-13 RX ORDER — LEVOFLOXACIN 750 MG/1
750 TABLET, FILM COATED ORAL EVERY OTHER DAY
Qty: 2 TABLET | Refills: 0 | Status: SHIPPED | OUTPATIENT
Start: 2024-01-14 | End: 2024-01-17

## 2024-01-13 RX ADMIN — SODIUM CHLORIDE 75 ML/HR: 0.9 INJECTION, SOLUTION INTRAVENOUS at 05:23

## 2024-01-13 RX ADMIN — PRAVASTATIN SODIUM 40 MG: 40 TABLET ORAL at 08:14

## 2024-01-13 RX ADMIN — TIMOLOL MALEATE 1 DROP: 5 SOLUTION/ DROPS OPHTHALMIC at 08:15

## 2024-01-13 RX ADMIN — APIXABAN 2.5 MG: 2.5 TABLET, FILM COATED ORAL at 08:14

## 2024-01-13 RX ADMIN — PANTOPRAZOLE SODIUM 40 MG: 40 TABLET, DELAYED RELEASE ORAL at 05:23

## 2024-01-13 NOTE — PLAN OF CARE
Problem: Potential for Falls  Goal: Patient will remain free of falls  Description: INTERVENTIONS:  - Educate patient/family on patient safety including physical limitations  - Instruct patient to call for assistance with activity   - Consult OT/PT to assist with strengthening/mobility   - Keep Call bell within reach  - Keep bed low and locked with side rails adjusted as appropriate  - Keep care items and personal belongings within reach  - Initiate and maintain comfort rounds  - Make Fall Risk Sign visible to staff  - Offer Toileting every 2 Hours, in advance of need  - Initiate/Maintain bed/chair alarm  - Obtain necessary fall risk management equipment: alarms  - Apply yellow socks and bracelet for high fall risk patients  - Consider moving patient to room near nurses station  1/13/2024 1305 by Isha Kwok LPN  Outcome: Adequate for Discharge  1/13/2024 0911 by Isha Kwok LPN  Outcome: Progressing     Problem: PAIN - ADULT  Goal: Verbalizes/displays adequate comfort level or baseline comfort level  Description: Interventions:  - Encourage patient to monitor pain and request assistance  - Assess pain using appropriate pain scale  - Administer analgesics based on type and severity of pain and evaluate response  - Implement non-pharmacological measures as appropriate and evaluate response  - Consider cultural and social influences on pain and pain management  - Notify physician/advanced practitioner if interventions unsuccessful or patient reports new pain  1/13/2024 1305 by Isha Kwok LPN  Outcome: Adequate for Discharge  1/13/2024 0911 by Isha Kwok LPN  Outcome: Progressing     Problem: INFECTION - ADULT  Goal: Absence or prevention of progression during hospitalization  Description: INTERVENTIONS:  - Assess and monitor for signs and symptoms of infection  - Monitor lab/diagnostic results  - Monitor all insertion sites, i.e. indwelling lines, tubes, and drains  - Monitor endotracheal if  appropriate and nasal secretions for changes in amount and color  - [place] appropriate cooling/warming therapies per order  - Administer medications as ordered  - Instruct and encourage patient and family to use good hand hygiene technique  - Identify and instruct in appropriate isolation precautions for identified infection/condition  1/13/2024 1305 by Isha Kwok LPN  Outcome: Adequate for Discharge  1/13/2024 0911 by Isha Kwok LPN  Outcome: Progressing     Problem: SAFETY ADULT  Goal: Patient will remain free of falls  Description: INTERVENTIONS:  - Educate patient/family on patient safety including physical limitations  - Instruct patient to call for assistance with activity   - Consult OT/PT to assist with strengthening/mobility   - Keep Call bell within reach  - Keep bed low and locked with side rails adjusted as appropriate  - Keep care items and personal belongings within reach  - Initiate and maintain comfort rounds  - Make Fall Risk Sign visible to staff  - Offer Toileting every 2 Hours, in advance of need  - Initiate/Maintain bed/chair alarm  - Obtain necessary fall risk management equipment: alarms  - Apply yellow socks and bracelet for high fall risk patients  - Consider moving patient to room near nurses station  1/13/2024 1305 by Isha Kwok LPN  Outcome: Adequate for Discharge  1/13/2024 0911 by Isha Kwok LPN  Outcome: Progressing  Goal: Maintain or return to baseline ADL function  Description: INTERVENTIONS:  -  Assess patient's ability to carry out ADLs; assess patient's baseline for ADL function and identify physical deficits which impact ability to perform ADLs (bathing, care of mouth/teeth, toileting, grooming, dressing, etc.)  - Assess/evaluate cause of self-care deficits   - Assess range of motion  - Assess patient's mobility; develop plan if impaired  - Assess patient's need for assistive devices and provide as appropriate  - Encourage maximum independence but  intervene and supervise when necessary  - Involve family in performance of ADLs  - Assess for home care needs following discharge   - Consider OT consult to assist with ADL evaluation and planning for discharge  - Provide patient education as appropriate  1/13/2024 1305 by Isha Kwok LPN  Outcome: Adequate for Discharge  1/13/2024 0911 by Isha Kwok LPN  Outcome: Progressing  Goal: Maintains/Returns to pre admission functional level  Description: INTERVENTIONS:  - Perform AM-PAC 6 Click Basic Mobility/ Daily Activity assessment daily.  - Set and communicate daily mobility goal to care team and patient/family/caregiver.   - Collaborate with rehabilitation services on mobility goals if consulted  - Perform Range of Motion TID times a day.  - Reposition patient every 2 hours.  - Dangle patient TID times a day  - Stand patient TID times a day  - Ambulate patient TId times a day  - Out of bed to chair TID times a day   - Out of bed for meals TID times a day  - Out of bed for toileting  - Record patient progress and toleration of activity level   1/13/2024 1305 by Isha Kwok LPN  Outcome: Adequate for Discharge  1/13/2024 0911 by Isha Kwok LPN  Outcome: Progressing     Problem: DISCHARGE PLANNING  Goal: Discharge to home or other facility with appropriate resources  Description: INTERVENTIONS:  - Identify barriers to discharge w/patient and caregiver  - Arrange for needed discharge resources and transportation as appropriate  - Identify discharge learning needs (meds, wound care, etc.)  - Arrange for interpretive services to assist at discharge as needed  - Refer to Case Management Department for coordinating discharge planning if the patient needs post-hospital services based on physician/advanced practitioner order or complex needs related to functional status, cognitive ability, or social support system  1/13/2024 1305 by Isha Kwok LPN  Outcome: Adequate for Discharge  1/13/2024 0911 by  Isha Kwok LPN  Outcome: Progressing     Problem: Knowledge Deficit  Goal: Patient/family/caregiver demonstrates understanding of disease process, treatment plan, medications, and discharge instructions  Description: Complete learning assessment and assess knowledge base.  Interventions:  - Provide teaching at level of understanding  - Provide teaching via preferred learning methods  1/13/2024 1305 by Isha Kwok LPN  Outcome: Adequate for Discharge  1/13/2024 0911 by Isha wKok LPN  Outcome: Progressing     Problem: METABOLIC, FLUID AND ELECTROLYTES - ADULT  Goal: Electrolytes maintained within normal limits  Description: INTERVENTIONS:  - Monitor labs and assess patient for signs and symptoms of electrolyte imbalances  - Administer electrolyte replacement as ordered  - Monitor response to electrolyte replacements, including repeat lab results as appropriate  - Instruct patient on fluid and nutrition as appropriate  1/13/2024 1305 by Isha Kwok LPN  Outcome: Adequate for Discharge  1/13/2024 0911 by Isha Kwok LPN  Outcome: Progressing     Problem: Prexisting or High Potential for Compromised Skin Integrity  Goal: Skin integrity is maintained or improved  Description: INTERVENTIONS:  - Identify patients at risk for skin breakdown  - Assess and monitor skin integrity  - Assess and monitor nutrition and hydration status  - Monitor labs   - Assess for incontinence   - Turn and reposition patient  - Assist with mobility/ambulation  - Relieve pressure over bony prominences  - Avoid friction and shearing  - Provide appropriate hygiene as needed including keeping skin clean and dry  - Evaluate need for skin moisturizer/barrier cream  - Collaborate with interdisciplinary team   - Patient/family teaching  - Consider wound care consult   1/13/2024 1305 by Isha Kwok LPN  Outcome: Adequate for Discharge  1/13/2024 0911 by Isha Kwok LPN  Outcome: Progressing

## 2024-01-13 NOTE — ASSESSMENT & PLAN NOTE
Hold lisinopril in light of joselito will discontinue blood pressure stable and has recurrence of hyperkalemia if needed directions given to use other options  Bp stable avoid <130

## 2024-01-13 NOTE — ASSESSMENT & PLAN NOTE
Lab Results   Component Value Date    EGFR 31 01/13/2024    EGFR 24 01/12/2024    EGFR 18 01/11/2024    CREATININE 1.48 (H) 01/13/2024    CREATININE 1.83 (H) 01/12/2024    CREATININE 2.33 (H) 01/11/2024   Suspect prerenal secondary to having nausea vomiting poor p.o. intake on top of being on lisinopril and Lasix repeat hyperkalemia potassium is normal.  Looks like creatinine baseline is overall 1.3-1.4  S/p fluids    I's and O's will bladder scan ultrasound of the kidney and bladder  Obtain UA Is with uti  hypotension SBP less than 130 avoid IV contrast and NSAIDs  I's and O's  Resolved discontinue fluids patient's creatinine is back to baseline we will discontinue lisinopril as an outpatient blood pressure stable and she has recurrence of hyperkalemia placed instructions of BP med as needed in the future to use other options.  Levaquin for 2 more days Lasix may be restarted as previous   Us kidney and bladder - no obstruction

## 2024-01-13 NOTE — CASE MANAGEMENT
Case Management Discharge Planning Note    Patient name Fang Victor  Location /-01 MRN [id number]  : 1935 Date 2024       Current Admission Date: 2024  Current Admission Diagnosis:Acute kidney injury superimposed on chronic kidney disease    Patient Active Problem List    Diagnosis Date Noted    Acute kidney injury superimposed on chronic kidney disease  2024    DVT (deep venous thrombosis) (HCC) 2024    ILD (interstitial lung disease) (HCC) 2024    HTN (hypertension) 2024    Lymphedema 2024      LOS (days): 2  Geometric Mean LOS (GMLOS) (days): 3.1  Days to GMLOS:1.4     OBJECTIVE:  Risk of Unplanned Readmission Score: 9.98         Current admission status: Inpatient   Preferred Pharmacy:   PATIENT/FAMILY REPORTS NO PREFERRED PHARMACY  No address on file      Primary Care Provider: Simba Jameson MD    Primary Insurance: [employer] [facility] RE[facility]  Secondary Insurance:     DISCHARGE DETAILS:                       Due to insurance issues, [facility] can no longer accept pt  [facility] has accepted at time of dc

## 2024-01-13 NOTE — DISCHARGE INSTR - AVS FIRST PAGE
Lisinopril has been stopped as recurrent hyperkalemia and bp is stable  If bp med is needed use other options   Restart lasix jose    Bmp one week   If patient not eating good or has gi symptoms would hold lasix that  day

## 2024-01-13 NOTE — PLAN OF CARE
Problem: Potential for Falls  Goal: Patient will remain free of falls  Description: INTERVENTIONS:  - Educate patient/family on patient safety including physical limitations  - Instruct patient to call for assistance with activity   - Consult OT/PT to assist with strengthening/mobility   - Keep Call bell within reach  - Keep bed low and locked with side rails adjusted as appropriate  - Keep care items and personal belongings within reach  - Initiate and maintain comfort rounds  - Make Fall Risk Sign visible to staff  - Offer Toileting every 2 Hours, in advance of need  - Initiate/Maintain bed/chair alarm  - Obtain necessary fall risk management equipment: alarms  - Apply yellow socks and bracelet for high fall risk patients  - Consider moving patient to room near nurses station  Outcome: Progressing     Problem: PAIN - ADULT  Goal: Verbalizes/displays adequate comfort level or baseline comfort level  Description: Interventions:  - Encourage patient to monitor pain and request assistance  - Assess pain using appropriate pain scale  - Administer analgesics based on type and severity of pain and evaluate response  - Implement non-pharmacological measures as appropriate and evaluate response  - Consider cultural and social influences on pain and pain management  - Notify physician/advanced practitioner if interventions unsuccessful or patient reports new pain  Outcome: Progressing     Problem: INFECTION - ADULT  Goal: Absence or prevention of progression during hospitalization  Description: INTERVENTIONS:  - Assess and monitor for signs and symptoms of infection  - Monitor lab/diagnostic results  - Monitor all insertion sites, i.e. indwelling lines, tubes, and drains  - Monitor endotracheal if appropriate and nasal secretions for changes in amount and color  - [place] appropriate cooling/warming therapies per order  - Administer medications as ordered  - Instruct and encourage patient and family to use good hand  hygiene technique  - Identify and instruct in appropriate isolation precautions for identified infection/condition  Outcome: Progressing     Problem: SAFETY ADULT  Goal: Patient will remain free of falls  Description: INTERVENTIONS:  - Educate patient/family on patient safety including physical limitations  - Instruct patient to call for assistance with activity   - Consult OT/PT to assist with strengthening/mobility   - Keep Call bell within reach  - Keep bed low and locked with side rails adjusted as appropriate  - Keep care items and personal belongings within reach  - Initiate and maintain comfort rounds  - Make Fall Risk Sign visible to staff  - Offer Toileting every 2 Hours, in advance of need  - Initiate/Maintain bed/chair alarm  - Obtain necessary fall risk management equipment: alarms  - Apply yellow socks and bracelet for high fall risk patients  - Consider moving patient to room near nurses station  Outcome: Progressing  Goal: Maintain or return to baseline ADL function  Description: INTERVENTIONS:  -  Assess patient's ability to carry out ADLs; assess patient's baseline for ADL function and identify physical deficits which impact ability to perform ADLs (bathing, care of mouth/teeth, toileting, grooming, dressing, etc.)  - Assess/evaluate cause of self-care deficits   - Assess range of motion  - Assess patient's mobility; develop plan if impaired  - Assess patient's need for assistive devices and provide as appropriate  - Encourage maximum independence but intervene and supervise when necessary  - Involve family in performance of ADLs  - Assess for home care needs following discharge   - Consider OT consult to assist with ADL evaluation and planning for discharge  - Provide patient education as appropriate  Outcome: Progressing  Goal: Maintains/Returns to pre admission functional level  Description: INTERVENTIONS:  - Perform AM-PAC 6 Click Basic Mobility/ Daily Activity assessment daily.  - Set and  communicate daily mobility goal to care team and patient/family/caregiver.   - Collaborate with rehabilitation services on mobility goals if consulted  - Perform Range of Motion TID times a day.  - Reposition patient every 2 hours.  - Dangle patient TID times a day  - Stand patient TID times a day  - Ambulate patient TId times a day  - Out of bed to chair TID times a day   - Out of bed for meals TID times a day  - Out of bed for toileting  - Record patient progress and toleration of activity level   Outcome: Progressing     Problem: DISCHARGE PLANNING  Goal: Discharge to home or other facility with appropriate resources  Description: INTERVENTIONS:  - Identify barriers to discharge w/patient and caregiver  - Arrange for needed discharge resources and transportation as appropriate  - Identify discharge learning needs (meds, wound care, etc.)  - Arrange for interpretive services to assist at discharge as needed  - Refer to Case Management Department for coordinating discharge planning if the patient needs post-hospital services based on physician/advanced practitioner order or complex needs related to functional status, cognitive ability, or social support system  Outcome: Progressing     Problem: Knowledge Deficit  Goal: Patient/family/caregiver demonstrates understanding of disease process, treatment plan, medications, and discharge instructions  Description: Complete learning assessment and assess knowledge base.  Interventions:  - Provide teaching at level of understanding  - Provide teaching via preferred learning methods  Outcome: Progressing     Problem: METABOLIC, FLUID AND ELECTROLYTES - ADULT  Goal: Electrolytes maintained within normal limits  Description: INTERVENTIONS:  - Monitor labs and assess patient for signs and symptoms of electrolyte imbalances  - Administer electrolyte replacement as ordered  - Monitor response to electrolyte replacements, including repeat lab results as appropriate  - Instruct  patient on fluid and nutrition as appropriate  Outcome: Progressing     Problem: Prexisting or High Potential for Compromised Skin Integrity  Goal: Skin integrity is maintained or improved  Description: INTERVENTIONS:  - Identify patients at risk for skin breakdown  - Assess and monitor skin integrity  - Assess and monitor nutrition and hydration status  - Monitor labs   - Assess for incontinence   - Turn and reposition patient  - Assist with mobility/ambulation  - Relieve pressure over bony prominences  - Avoid friction and shearing  - Provide appropriate hygiene as needed including keeping skin clean and dry  - Evaluate need for skin moisturizer/barrier cream  - Collaborate with interdisciplinary team   - Patient/family teaching  - Consider wound care consult   Outcome: Progressing

## 2024-01-13 NOTE — UTILIZATION REVIEW
NOTIFICATION OF INPATIENT ADMISSION   AUTHORIZATION REQUEST   SERVICING FACILITY:   [place] [address]  Tax ID: [id number]  NPI: [id number] ATTENDING PROVIDER:  Attending Name and NPI#: Nicole Galeana Md [[id number]]  Address: [address] [place] [address]  Phone: [phone]   ADMISSION INFORMATION:  Place of Service: Inpatient [facility]  Place of Service Code: 21  Inpatient Admission Date/Time: 1/11/24  4:30 PM  Discharge Date/Time: No discharge date for patient encounter.  Admitting Diagnosis Code/Description:  Abnormal laboratory test result [R89.9]  MICHAEL (acute kidney injury) (HCC) [N17.9]     UTILIZATION REVIEW CONTACT:  Josi Choi Utilization   Network Utilization Review Department  Phone: [phone]  Fax [phone]  Email: [email or web address]  Contact for approvals/pending authorizations, clinical reviews, and discharge.     PHYSICIAN ADVISORY SERVICES:  Medical Necessity Denial & Fbph-qj-Jizn Review  Phone: [phone]  Fax: [phone]  Email: [email or web address]     DISCHARGE SUPPORT TEAM:  For Patients Discharge Needs & Updates  Phone: [phone] opt. 2 Fax: [phone]  Email: [email or web address]

## 2024-01-13 NOTE — PLAN OF CARE
Problem: Potential for Falls  Goal: Patient will remain free of falls  Description: INTERVENTIONS:  - Educate patient/family on patient safety including physical limitations  - Instruct patient to call for assistance with activity   - Consult OT/PT to assist with strengthening/mobility   - Keep Call bell within reach  - Keep bed low and locked with side rails adjusted as appropriate  - Keep care items and personal belongings within reach  - Initiate and maintain comfort rounds  - Make Fall Risk Sign visible to staff  - Offer Toileting every 2 Hours, in advance of need  - Initiate/Maintain bed/chair alarm  - Obtain necessary fall risk management equipment: alarms  - Apply yellow socks and bracelet for high fall risk patients  - Consider moving patient to room near nurses station  Outcome: Progressing     Problem: PAIN - ADULT  Goal: Verbalizes/displays adequate comfort level or baseline comfort level  Description: Interventions:  - Encourage patient to monitor pain and request assistance  - Assess pain using appropriate pain scale  - Administer analgesics based on type and severity of pain and evaluate response  - Implement non-pharmacological measures as appropriate and evaluate response  - Consider cultural and social influences on pain and pain management  - Notify physician/advanced practitioner if interventions unsuccessful or patient reports new pain  Outcome: Progressing     Problem: INFECTION - ADULT  Goal: Absence or prevention of progression during hospitalization  Description: INTERVENTIONS:  - Assess and monitor for signs and symptoms of infection  - Monitor lab/diagnostic results  - Monitor all insertion sites, i.e. indwelling lines, tubes, and drains  - Monitor endotracheal if appropriate and nasal secretions for changes in amount and color  - [place] appropriate cooling/warming therapies per order  - Administer medications as ordered  - Instruct and encourage patient and family to use good hand  hygiene technique  - Identify and instruct in appropriate isolation precautions for identified infection/condition  Outcome: Progressing     Problem: SAFETY ADULT  Goal: Patient will remain free of falls  Description: INTERVENTIONS:  - Educate patient/family on patient safety including physical limitations  - Instruct patient to call for assistance with activity   - Consult OT/PT to assist with strengthening/mobility   - Keep Call bell within reach  - Keep bed low and locked with side rails adjusted as appropriate  - Keep care items and personal belongings within reach  - Initiate and maintain comfort rounds  - Make Fall Risk Sign visible to staff  - Offer Toileting every 2 Hours, in advance of need  - Initiate/Maintain bed/chair alarm  - Obtain necessary fall risk management equipment: alarms  - Apply yellow socks and bracelet for high fall risk patients  - Consider moving patient to room near nurses station  Outcome: Progressing  Goal: Maintain or return to baseline ADL function  Description: INTERVENTIONS:  -  Assess patient's ability to carry out ADLs; assess patient's baseline for ADL function and identify physical deficits which impact ability to perform ADLs (bathing, care of mouth/teeth, toileting, grooming, dressing, etc.)  - Assess/evaluate cause of self-care deficits   - Assess range of motion  - Assess patient's mobility; develop plan if impaired  - Assess patient's need for assistive devices and provide as appropriate  - Encourage maximum independence but intervene and supervise when necessary  - Involve family in performance of ADLs  - Assess for home care needs following discharge   - Consider OT consult to assist with ADL evaluation and planning for discharge  - Provide patient education as appropriate  Outcome: Progressing  Goal: Maintains/Returns to pre admission functional level  Description: INTERVENTIONS:  - Perform AM-PAC 6 Click Basic Mobility/ Daily Activity assessment daily.  - Set and  communicate daily mobility goal to care team and patient/family/caregiver.   - Collaborate with rehabilitation services on mobility goals if consulted  - Perform Range of Motion TID times a day.  - Reposition patient every 2 hours.  - Dangle patient TID times a day  - Stand patient TID times a day  - Ambulate patient TId times a day  - Out of bed to chair TID times a day   - Out of bed for meals TID times a day  - Out of bed for toileting  - Record patient progress and toleration of activity level   Outcome: Progressing     Problem: DISCHARGE PLANNING  Goal: Discharge to home or other facility with appropriate resources  Description: INTERVENTIONS:  - Identify barriers to discharge w/patient and caregiver  - Arrange for needed discharge resources and transportation as appropriate  - Identify discharge learning needs (meds, wound care, etc.)  - Arrange for interpretive services to assist at discharge as needed  - Refer to Case Management Department for coordinating discharge planning if the patient needs post-hospital services based on physician/advanced practitioner order or complex needs related to functional status, cognitive ability, or social support system  Outcome: Progressing     Problem: Knowledge Deficit  Goal: Patient/family/caregiver demonstrates understanding of disease process, treatment plan, medications, and discharge instructions  Description: Complete learning assessment and assess knowledge base.  Interventions:  - Provide teaching at level of understanding  - Provide teaching via preferred learning methods  Outcome: Progressing     Problem: METABOLIC, FLUID AND ELECTROLYTES - ADULT  Goal: Electrolytes maintained within normal limits  Description: INTERVENTIONS:  - Monitor labs and assess patient for signs and symptoms of electrolyte imbalances  - Administer electrolyte replacement as ordered  - Monitor response to electrolyte replacements, including repeat lab results as appropriate  - Instruct  patient on fluid and nutrition as appropriate  Outcome: Progressing     Problem: Prexisting or High Potential for Compromised Skin Integrity  Goal: Skin integrity is maintained or improved  Description: INTERVENTIONS:  - Identify patients at risk for skin breakdown  - Assess and monitor skin integrity  - Assess and monitor nutrition and hydration status  - Monitor labs   - Assess for incontinence   - Turn and reposition patient  - Assist with mobility/ambulation  - Relieve pressure over bony prominences  - Avoid friction and shearing  - Provide appropriate hygiene as needed including keeping skin clean and dry  - Evaluate need for skin moisturizer/barrier cream  - Collaborate with interdisciplinary team   - Patient/family teaching  - Consider wound care consult   Outcome: Progressing

## 2024-01-13 NOTE — DISCHARGE SUMMARY
[facility]  Discharge- Fang Victor 1935, 88 y.o. female MRN: [id number]  Unit/Bed#: [facility] Encounter: [id number]  Primary Care Provider: Simba Jameson MD   Date and time admitted to hospital: 1/11/2024  1:56 PM    * Acute kidney injury superimposed on chronic kidney disease   Assessment & Plan  Lab Results   Component Value Date    EGFR 31 01/13/2024    EGFR 24 01/12/2024    EGFR 18 01/11/2024    CREATININE 1.48 (H) 01/13/2024    CREATININE 1.83 (H) 01/12/2024    CREATININE 2.33 (H) 01/11/2024   Suspect prerenal secondary to having nausea vomiting poor p.o. intake on top of being on lisinopril and Lasix repeat hyperkalemia potassium is normal.  Looks like creatinine baseline is overall 1.3-1.4  Given a liter of fluids will hydrate with normal saline 75 mill per hour patient has Rales but this is compliant with her interstitial lung disease sounds.   I's and O's will bladder scan ultrasound of the kidney and bladder  Obtain UA Is with uti  hypotension SBP less than 130 avoid IV contrast and NSAIDs  I's and O's  Resolved discontinue fluids patient's creatinine is back to baseline we will discontinue lisinopril as an outpatient blood pressure stable and she has recurrence of hyperkalemia placed instructions of BP med as needed in the future to use other options.  Levaquin for 2 more days Lasix may be restarted as needed  Us kidney and bladder - no obstruction    Lymphedema  Assessment & Plan  Chronic per patient at base  May resume Lasix as previous    HTN (hypertension)  Assessment & Plan  Hold lisinopril in light of joselito will discontinue blood pressure stable and has recurrence of hyperkalemia if needed directions given to use other options  Bp stable avoid <130    ILD (interstitial lung disease) ([facility])  Assessment & Plan  Hence with rales   EVIDENT ON CT CHEST   Referral seen to be made to pulmonary follow up once discharged     DVT (deep venous thrombosis) ([facility])  Assessment &  "Plan  Hx of  continue eliquis          Medical Problems       Resolved Problems  Date Reviewed: 1/13/2024   None       Discharging Physician / Practitioner: Nicole Galeana MD  PCP: Simba Jameson MD  Admission Date:   Admission Orders (From admission, onward)       Ordered        01/11/24 1630  INPATIENT ADMISSION  Once                          Discharge Date: 01/13/24    Consultations During Hospital Stay:  none    Procedures Performed:   none    Significant Findings / Test Results:   Us of kidney and bladder -No hydronephrosis.     Mild bilateral renal atrophy.    Incidental Findings:   none    Test Results Pending at Discharge (will require follow up):   none     Outpatient Tests Requested:  none    Complications:  none    Reason for Admission: michael    Hospital Course:   Fang Victor is a 88 y.o. female patient who originally presented to the hospital on 1/11/2024 due to acute kidney injury.  It has not had some GI symptoms and was not eating or drinking much and being on Lasix and lisinopril found to have acute kidney injury with hyperkalemia which was stabilized patient hydrated and MICHAEL has resolved she had no GI symptoms eating well.  Her lisinopril is held and will be discontinued as looking into history if she has recurrence of hyperkalemia blood pressure stable if needed outpatient May use other options Lasix may be resumed.no obstruction. Also found to have uti - levaquin for 2 more days given.        Please see above list of diagnoses and related plan for additional information.     Condition at Discharge: stable    Discharge Day Visit / Exam:   Subjective:  seen and examined no complaints wants to go home  Vitals: Blood Pressure: 129/54 (01/13/24 0716)  Pulse: 66 (01/13/24 0716)  Temperature: 97.5 °F (36.4 °C) (01/13/24 0716)  Temp Source: Temporal (01/11/24 1737)  Respirations: 19 (01/13/24 0716)  Height: 4' 10\" (147.3 cm) (01/12/24 1100)  Weight - Scale: 103 kg (227 lb 4.7 oz) (01/11/24 1737)  SpO2: " 93 % (01/13/24 0751)  Exam:   Physical Exam  Vitals and nursing note reviewed.   Constitutional:       General: She is not in acute distress.     Appearance: She is well-developed.   HENT:      Head: Normocephalic and atraumatic.   Eyes:      Conjunctiva/sclera: Conjunctivae normal.   Cardiovascular:      Rate and Rhythm: Normal rate and regular rhythm.      Heart sounds: No murmur heard.  Pulmonary:      Effort: Pulmonary effort is normal. No respiratory distress.      Breath sounds: Rales present. No wheezing.   Abdominal:      General: There is no distension.      Palpations: Abdomen is soft.      Tenderness: There is no abdominal tenderness.   Musculoskeletal:         General: Swelling (b/l chronic lymphedema at baseline) present.      Cervical back: Neck supple.   Skin:     General: Skin is warm and dry.      Capillary Refill: Capillary refill takes less than 2 seconds.   Neurological:      Mental Status: She is alert.   Psychiatric:         Mood and Affect: Mood normal.          Discussion with Family: pt    Discharge instructions/Information to patient and family:   See after visit summary for information provided to patient and family.      Provisions for Follow-Up Care:  See after visit summary for information related to follow-up care and any pertinent home health orders.      Mobility at time of Discharge:   Basic Mobility Inpatient Raw Score: 16  JH-HLM Goal: 5: Stand one or more mins  JH-HLM Achieved: 2: Bed activities/Dependent transfer  HLM Goal achieved. Continue to encourage appropriate mobility.     Disposition:   Home with VNA Services (Reminder: Complete face to face encounter)    Planned Readmission: no     Discharge Statement:  I spent >35 minutes discharging the patient. This time was spent on the day of discharge. I had direct contact with the patient on the day of discharge. Greater than 50% of the total time was spent examining patient, answering all patient questions, arranging and  discussing plan of care with patient as well as directly providing post-discharge instructions.  Additional time then spent on discharge activities.    Discharge Medications:  See after visit summary for reconciled discharge medications provided to patient and/or family.      **Please Note: This note may have been constructed using a voice recognition system**

## 2024-01-13 NOTE — CASE MANAGEMENT
Case Management Discharge Planning Note    Patient name Fang Victor  Location /-01 MRN [id number]  : 1935 Date 2024       Current Admission Date: 2024  Current Admission Diagnosis:Acute kidney injury superimposed on chronic kidney disease    Patient Active Problem List    Diagnosis Date Noted    Acute kidney injury superimposed on chronic kidney disease  2024    DVT (deep venous thrombosis) (HCC) 2024    ILD (interstitial lung disease) ([facility]) 2024    HTN (hypertension) 2024    Lymphedema 2024      LOS (days): 2  Geometric Mean LOS (GMLOS) (days): 3.1  Days to GMLOS:1.2     OBJECTIVE:  Risk of Unplanned Readmission Score: 9.89         Current admission status: Inpatient   Preferred Pharmacy:   PATIENT/FAMILY REPORTS NO PREFERRED PHARMACY  No address on file      Primary Care Provider: Simba Jameson MD    Primary Insurance: [facility] REP  Secondary Insurance:     DISCHARGE DETAILS:                    Pt medically stable for discharge.  CM placing transportation request to [facility] [place].  Pt requesting [facility], CM adding preference to request  CM unable to reach staff at [place],  left several messages for Administration and Nursing unit. CM faxing facility informing pt is med clear and returning to facility today.  CM faxing dc instructions        As per Hector ALVARADO will  pt at 1500 to transport to [facility] via

## 2024-01-13 NOTE — PROGRESS NOTES
Patient:    MRN:  [id number]    Zach Request ID:  [id number]    Level of care reserved:  Home Health Agency    Partner Reserved:  [facility], [place] [address] [phone]    Clinical needs requested:    Geography searched:  [phone]    Start of Service:    Request sent:  2:40pm EST on 1/12/2024 by Rachel Ferreira    Partner reserved:  8:49am EST on 1/13/2024 by Rachel Ferreira    Choice list shared:

## 2024-01-14 LAB — BACTERIA UR CULT: ABNORMAL

## 2024-01-15 NOTE — UTILIZATION REVIEW
NOTIFICATION OF ADMISSION DISCHARGE   This is a Notification of Discharge from [facility]. Please be advised that this patient has been discharge from our facility. Below you will find the admission and discharge date and time including the patient’s disposition.   UTILIZATION REVIEW CONTACT:  Josi Choi  Utilization   Network Utilization Review Department  Phone: [phone] x carefully listen to the prompts. All voicemails are confidential.  Email: [email or web address]     ADMISSION INFORMATION  PRESENTATION DATE: 1/11/2024  1:56 PM  OBERVATION ADMISSION DATE:   INPATIENT ADMISSION DATE: 1/11/24  4:30 PM   DISCHARGE DATE: 1/13/2024  3:08 PM   DISPOSITION:Home with Home Health Care    Network Utilization Review Department  ATTENTION: Please call with any questions or concerns to [phone] and carefully listen to the prompts so that you are directed to the right person. All voicemails are confidential.   For Discharge needs, contact Care Management DC Support Team at [phone] opt. 2  Send all requests for admission clinical reviews, approved or denied determinations and any other requests to dedicated fax number below belonging to the campus where the patient is receiving treatment. List of dedicated fax numbers for the Facilities:  FACILITY NAME UR FAX NUMBER   ADMISSION DENIALS (Administrative/Medical Necessity) [phone]   DISCHARGE SUPPORT TEAM ([facility]) [phone]   PARENT CHILD HEALTH (Maternity/NICU/Pediatrics) [phone]   General acute hospital [phone]   [facility] [phone]   [facility] [phone]   [facility] [phone]   [facility] [phone]   [facility] [phone]   [facility] [phone]   [facility]  [place] [phone]   [facility] [phone]   [facility] [phone]   [facility] [phone]

## 2024-02-02 ENCOUNTER — LAB REQUISITION (OUTPATIENT)
Dept: LAB | Facility: HOSPITAL | Age: 89
End: 2024-02-02
Payer: COMMERCIAL

## 2024-02-02 DIAGNOSIS — N18.9 CHRONIC KIDNEY DISEASE, UNSPECIFIED: ICD-10-CM

## 2024-02-02 LAB
ANION GAP SERPL CALCULATED.3IONS-SCNC: 8 MMOL/L
BUN SERPL-MCNC: 32 MG/DL (ref 5–25)
CALCIUM SERPL-MCNC: 8.9 MG/DL (ref 8.4–10.2)
CHLORIDE SERPL-SCNC: 105 MMOL/L (ref 96–108)
CO2 SERPL-SCNC: 27 MMOL/L (ref 21–32)
CREAT SERPL-MCNC: 1.4 MG/DL (ref 0.6–1.3)
GFR SERPL CREATININE-BSD FRML MDRD: 34 ML/MIN/1.73SQ M
GLUCOSE P FAST SERPL-MCNC: 93 MG/DL (ref 65–99)
POTASSIUM SERPL-SCNC: 4.4 MMOL/L (ref 3.5–5.3)
SODIUM SERPL-SCNC: 140 MMOL/L (ref 135–147)

## 2024-02-02 PROCEDURE — 80048 BASIC METABOLIC PNL TOTAL CA: CPT | Performed by: FAMILY MEDICINE

## 2024-12-12 ENCOUNTER — TELEPHONE (OUTPATIENT)
Age: 89
End: 2024-12-12

## 2024-12-12 NOTE — TELEPHONE ENCOUNTER
Received from Dr. Simba Jameson office that they have been trying to get this pt in our [place] office as she is a nursing home resident with no transportaion and spoke with someone back in October that we have told them it would be okay to schedule this pt here with Dr. Flores.     Informed there was no documentation in the chart nor referral would be able to do a new pt appt if needed and asked for diagnosis. Was provided pulmonary fibrosis, when using the decision tree it did not give the option of of [place] and informed Naa of that, however Dr. Jameson insisted that we get the patient scheduled in at [place]. Was able to get pt scheduled as ILD new pt with Dr. Flores at [facility], however did inform Naa that I am not sure that this appt would be appropriate and would have management take a look as it had to be booked a bit differently, to which she understood and that we may have to get back if this appt cannot be completed.     Can reach Naa at [phone]. From Dr. Jameson office.

## 2025-01-13 RX ORDER — IPRATROPIUM BROMIDE AND ALBUTEROL SULFATE 2.5; .5 MG/3ML; MG/3ML
SOLUTION RESPIRATORY (INHALATION)
COMMUNITY
Start: 2024-10-11

## 2025-02-10 ENCOUNTER — TELEPHONE (OUTPATIENT)
Age: 89
End: 2025-02-10

## 2025-02-10 NOTE — TELEPHONE ENCOUNTER
Patient called to reschedule appointment 2/11/25 at 9:30 with Dr Flores-Sayed. She is now scheduled for soonest available 4/8/25 at 2:00 pm. Patient is ILD new patient and declined going to any other locations with sooner availability. She would only like [facility] as [facility] is her transportation. Patient requested a call back if able to schedule her sooner. Please advise.

## 2025-02-11 NOTE — TELEPHONE ENCOUNTER
Patient will keep 4/8/25 appointment   Spoke with patient. Reviewed full message.     Patient would like to know if she should still take lomotil if she is starting the colestipol.     Patient mentioned that she has already been taking the dicyclomine 10 mg 4 times a day for 1 week and it is not helping.     Attempted to schedule follow up with Dr. Nunn or Dr. Ambriz. Patient would like an appointment yet this week as she said she has been dealing with this for a year. Nothing available with providers in any offices. Patient cannot do Monday or Tuesday next week as her  is having surgery and she needs to stay with him. The rest of the week she would need after 3 pm in  and the providers are not in the [place] office. Would either Dr. Nunn or Dr. Ambriz be able to squeeze this patient in anywhere?

## 2025-04-04 ENCOUNTER — APPOINTMENT (EMERGENCY)
Dept: RADIOLOGY | Facility: HOSPITAL | Age: 89
DRG: 189 | End: 2025-04-04
Payer: COMMERCIAL

## 2025-04-04 ENCOUNTER — HOSPITAL ENCOUNTER (INPATIENT)
Facility: HOSPITAL | Age: 89
LOS: 3 days | Discharge: NURSING, REHAB OR LONG-TERM CARE | DRG: 189 | End: 2025-04-08
Attending: EMERGENCY MEDICINE | Admitting: FAMILY MEDICINE
Payer: COMMERCIAL

## 2025-04-04 ENCOUNTER — APPOINTMENT (OUTPATIENT)
Dept: CT IMAGING | Facility: HOSPITAL | Age: 89
DRG: 189 | End: 2025-04-04
Payer: COMMERCIAL

## 2025-04-04 ENCOUNTER — APPOINTMENT (OUTPATIENT)
Dept: NON INVASIVE DIAGNOSTICS | Facility: HOSPITAL | Age: 89
DRG: 189 | End: 2025-04-04
Payer: COMMERCIAL

## 2025-04-04 DIAGNOSIS — R09.02 HYPOXIA: ICD-10-CM

## 2025-04-04 DIAGNOSIS — J84.10 PULMONARY FIBROSIS (HCC): ICD-10-CM

## 2025-04-04 DIAGNOSIS — L30.4 INTERTRIGO: ICD-10-CM

## 2025-04-04 DIAGNOSIS — J81.0 ACUTE PULMONARY EDEMA (HCC): ICD-10-CM

## 2025-04-04 DIAGNOSIS — J44.1 COPD EXACERBATION (HCC): Primary | ICD-10-CM

## 2025-04-04 DIAGNOSIS — J96.01 ACUTE RESPIRATORY FAILURE WITH HYPOXIA (HCC): ICD-10-CM

## 2025-04-04 PROBLEM — N18.32 STAGE 3B CHRONIC KIDNEY DISEASE (HCC): Status: ACTIVE | Noted: 2025-04-04

## 2025-04-04 PROBLEM — Z86.718 HISTORY OF DEEP VEIN THROMBOSIS (DVT) OF LOWER EXTREMITY: Status: ACTIVE | Noted: 2025-04-04

## 2025-04-04 PROBLEM — J81.1 PULMONARY EDEMA: Status: ACTIVE | Noted: 2025-04-04

## 2025-04-04 LAB
2HR DELTA HS TROPONIN: 6 NG/L
4HR DELTA HS TROPONIN: 9 NG/L
ALBUMIN SERPL BCG-MCNC: 3.7 G/DL (ref 3.5–5)
ALP SERPL-CCNC: 68 U/L (ref 34–104)
ALT SERPL W P-5'-P-CCNC: 8 U/L (ref 7–52)
ANION GAP SERPL CALCULATED.3IONS-SCNC: 11 MMOL/L (ref 4–13)
AORTIC ROOT: 2.8 CM
AORTIC VALVE MEAN VELOCITY: 17.7 M/S
ASCENDING AORTA: 2.9 CM
AST SERPL W P-5'-P-CCNC: 17 U/L (ref 13–39)
ATRIAL RATE: 78 BPM
AV AREA BY CONTINUOUS VTI: 0.8 CM2
AV AREA PEAK VELOCITY: 0.8 CM2
AV LVOT MEAN GRADIENT: 2 MMHG
AV LVOT PEAK GRADIENT: 4 MMHG
AV MEAN PRESS GRAD SYS DOP V1V2: 14 MMHG
AV ORIFICE AREA US: 0.8 CM2
AV PEAK GRADIENT: 24 MMHG
AV REGURGITATION PRESSURE HALF TIME: 404 MS
AV VELOCITY RATIO: 0.4
AV VMAX SYS DOP: 2.44 M/S
BASE EX.OXY STD BLDV CALC-SCNC: 85.3 % (ref 60–80)
BASE EXCESS BLDV CALC-SCNC: 1.5 MMOL/L
BASOPHILS # BLD AUTO: 0.05 THOUSANDS/ÂΜL (ref 0–0.1)
BASOPHILS NFR BLD AUTO: 1 % (ref 0–1)
BILIRUB SERPL-MCNC: 0.79 MG/DL (ref 0.2–1)
BNP SERPL-MCNC: 249 PG/ML (ref 0–100)
BSA FOR ECHO PROCEDURE: 1.92 M2
BUN SERPL-MCNC: 31 MG/DL (ref 5–25)
CALCIUM SERPL-MCNC: 8.8 MG/DL (ref 8.4–10.2)
CARDIAC TROPONIN I PNL SERPL HS: 37 NG/L (ref ?–50)
CARDIAC TROPONIN I PNL SERPL HS: 43 NG/L (ref ?–50)
CARDIAC TROPONIN I PNL SERPL HS: 46 NG/L (ref ?–50)
CHLORIDE SERPL-SCNC: 101 MMOL/L (ref 96–108)
CO2 SERPL-SCNC: 27 MMOL/L (ref 21–32)
CREAT SERPL-MCNC: 1.39 MG/DL (ref 0.6–1.3)
DOP CALC AO VTI: 55.18 CM
DOP CALC LVOT AREA: 2.01 CM2
DOP CALC LVOT CARDIAC INDEX: 1.82 L/MIN/M2
DOP CALC LVOT CARDIAC OUTPUT: 3.49 L/MIN
DOP CALC LVOT DIAMETER: 1.6 CM
DOP CALC LVOT PEAK VEL VTI: 21.96 CM
DOP CALC LVOT PEAK VEL: 0.95 M/S
DOP CALC LVOT STROKE INDEX: 22.4 ML/M2
DOP CALC LVOT STROKE VOLUME: 44.13
DOP CALC MV VTI: 42.24 CM
E WAVE DECELERATION TIME: 242 MS
E/A RATIO: 1.02
EOSINOPHIL # BLD AUTO: 0.08 THOUSAND/ÂΜL (ref 0–0.61)
EOSINOPHIL NFR BLD AUTO: 1 % (ref 0–6)
ERYTHROCYTE [DISTWIDTH] IN BLOOD BY AUTOMATED COUNT: 14.3 % (ref 11.6–15.1)
FLUAV AG UPPER RESP QL IA.RAPID: NEGATIVE
FLUBV AG UPPER RESP QL IA.RAPID: NEGATIVE
FRACTIONAL SHORTENING: 22 (ref 28–44)
GFR SERPL CREATININE-BSD FRML MDRD: 34 ML/MIN/1.73SQ M
GLUCOSE SERPL-MCNC: 120 MG/DL (ref 65–140)
HCO3 BLDV-SCNC: 26.4 MMOL/L (ref 24–30)
HCT VFR BLD AUTO: 41.8 % (ref 34.8–46.1)
HGB BLD-MCNC: 13.8 G/DL (ref 11.5–15.4)
IMM GRANULOCYTES # BLD AUTO: 0.03 THOUSAND/UL (ref 0–0.2)
IMM GRANULOCYTES NFR BLD AUTO: 0 % (ref 0–2)
INTERVENTRICULAR SEPTUM IN DIASTOLE (PARASTERNAL SHORT AXIS VIEW): 0.9 CM
INTERVENTRICULAR SEPTUM: 0.9 CM (ref 0.6–1.1)
LAAS-AP2: 18.6 CM2
LAAS-AP4: 19.7 CM2
LACTATE SERPL-SCNC: 1.3 MMOL/L (ref 0.5–2)
LEFT ATRIUM SIZE: 4.2 CM
LEFT ATRIUM VOLUME (MOD BIPLANE): 60 ML
LEFT ATRIUM VOLUME INDEX (MOD BIPLANE): 31.2 ML/M2
LEFT INTERNAL DIMENSION IN SYSTOLE: 2.8 CM (ref 2.1–4)
LEFT VENTRICLE DIASTOLIC VOLUME (MOD BIPLANE): 39 ML
LEFT VENTRICLE DIASTOLIC VOLUME INDEX (MOD BIPLANE): 20.3 ML/M2
LEFT VENTRICLE SYSTOLIC VOLUME (MOD BIPLANE): 9 ML
LEFT VENTRICLE SYSTOLIC VOLUME INDEX (MOD BIPLANE): 4.7 ML/M2
LEFT VENTRICULAR INTERNAL DIMENSION IN DIASTOLE: 3.6 CM (ref 3.5–6)
LEFT VENTRICULAR POSTERIOR WALL IN END DIASTOLE: 0.9 CM
LEFT VENTRICULAR STROKE VOLUME: 26 ML
LV EF BIPLANE MOD: 78 %
LV EF US.2D.A4C+ESTIMATED: 76 %
LVSV (TEICH): 26 ML
LYMPHOCYTES # BLD AUTO: 1.29 THOUSANDS/ÂΜL (ref 0.6–4.47)
LYMPHOCYTES NFR BLD AUTO: 15 % (ref 14–44)
MCH RBC QN AUTO: 33.3 PG (ref 26.8–34.3)
MCHC RBC AUTO-ENTMCNC: 33 G/DL (ref 31.4–37.4)
MCV RBC AUTO: 101 FL (ref 82–98)
MONOCYTES # BLD AUTO: 1.2 THOUSAND/ÂΜL (ref 0.17–1.22)
MONOCYTES NFR BLD AUTO: 14 % (ref 4–12)
MV E'TISSUE VEL-LAT: 7 CM/S
MV E'TISSUE VEL-SEP: 4 CM/S
MV MEAN GRADIENT: 4 MMHG
MV PEAK A VEL: 1.2 M/S
MV PEAK E VEL: 123 CM/S
MV PEAK GRADIENT: 9 MMHG
MV STENOSIS PRESSURE HALF TIME: 70 MS
MV VALVE AREA BY CONTINUITY EQUATION: 1.04 CM2
MV VALVE AREA P 1/2 METHOD: 3.14
NEUTROPHILS # BLD AUTO: 6.21 THOUSANDS/ÂΜL (ref 1.85–7.62)
NEUTS SEG NFR BLD AUTO: 69 % (ref 43–75)
NRBC BLD AUTO-RTO: 0 /100 WBCS
O2 CT BLDV-SCNC: 17.8 ML/DL
P AXIS: 64 DEGREES
PCO2 BLDV: 42.5 MM HG (ref 42–50)
PH BLDV: 7.41 [PH] (ref 7.3–7.4)
PLATELET # BLD AUTO: 182 THOUSANDS/UL (ref 149–390)
PMV BLD AUTO: 9.6 FL (ref 8.9–12.7)
PO2 BLDV: 53.4 MM HG (ref 35–45)
POTASSIUM SERPL-SCNC: 3.6 MMOL/L (ref 3.5–5.3)
PR INTERVAL: 160 MS
PROCALCITONIN SERPL-MCNC: 0.07 NG/ML
PROT SERPL-MCNC: 7.1 G/DL (ref 6.4–8.4)
QRS AXIS: -16 DEGREES
QRSD INTERVAL: 116 MS
QT INTERVAL: 414 MS
QTC INTERVAL: 471 MS
RA PRESSURE ESTIMATED: 3 MMHG
RBC # BLD AUTO: 4.14 MILLION/UL (ref 3.81–5.12)
RIGHT ATRIUM AREA SYSTOLE A4C: 13.4 CM2
RIGHT VENTRICLE ID DIMENSION: 3.7 CM
RV PSP: 50 MMHG
SARS-COV+SARS-COV-2 AG RESP QL IA.RAPID: NEGATIVE
SL CV AV DECELERATION TIME RETROGRADE: 1393 MS
SL CV AV PEAK GRADIENT RETROGRADE: 50 MMHG
SL CV LEFT ATRIUM LENGTH A2C: 5 CM
SL CV LV EF: 75
SL CV PED ECHO LEFT VENTRICLE DIASTOLIC VOLUME (MOD BIPLANE) 2D: 56 ML
SL CV PED ECHO LEFT VENTRICLE SYSTOLIC VOLUME (MOD BIPLANE) 2D: 30 ML
SODIUM SERPL-SCNC: 139 MMOL/L (ref 135–147)
T WAVE AXIS: -5 DEGREES
TR MAX PG: 47 MMHG
TR PEAK VELOCITY: 3.4 M/S
TRICUSPID ANNULAR PLANE SYSTOLIC EXCURSION: 1.7 CM
TRICUSPID VALVE PEAK REGURGITATION VELOCITY: 3.44 M/S
VENTRICULAR RATE: 78 BPM
WBC # BLD AUTO: 8.86 THOUSAND/UL (ref 4.31–10.16)

## 2025-04-04 PROCEDURE — 83605 ASSAY OF LACTIC ACID: CPT | Performed by: EMERGENCY MEDICINE

## 2025-04-04 PROCEDURE — 93005 ELECTROCARDIOGRAM TRACING: CPT

## 2025-04-04 PROCEDURE — 80053 COMPREHEN METABOLIC PANEL: CPT | Performed by: EMERGENCY MEDICINE

## 2025-04-04 PROCEDURE — 96365 THER/PROPH/DIAG IV INF INIT: CPT

## 2025-04-04 PROCEDURE — 87811 SARS-COV-2 COVID19 W/OPTIC: CPT | Performed by: EMERGENCY MEDICINE

## 2025-04-04 PROCEDURE — 84145 PROCALCITONIN (PCT): CPT | Performed by: INTERNAL MEDICINE

## 2025-04-04 PROCEDURE — 71045 X-RAY EXAM CHEST 1 VIEW: CPT

## 2025-04-04 PROCEDURE — 82805 BLOOD GASES W/O2 SATURATION: CPT | Performed by: EMERGENCY MEDICINE

## 2025-04-04 PROCEDURE — 83880 ASSAY OF NATRIURETIC PEPTIDE: CPT | Performed by: EMERGENCY MEDICINE

## 2025-04-04 PROCEDURE — 71250 CT THORAX DX C-: CPT

## 2025-04-04 PROCEDURE — 94640 AIRWAY INHALATION TREATMENT: CPT

## 2025-04-04 PROCEDURE — 84484 ASSAY OF TROPONIN QUANT: CPT | Performed by: EMERGENCY MEDICINE

## 2025-04-04 PROCEDURE — 85025 COMPLETE CBC W/AUTO DIFF WBC: CPT | Performed by: EMERGENCY MEDICINE

## 2025-04-04 PROCEDURE — 36415 COLL VENOUS BLD VENIPUNCTURE: CPT | Performed by: EMERGENCY MEDICINE

## 2025-04-04 PROCEDURE — 87081 CULTURE SCREEN ONLY: CPT | Performed by: FAMILY MEDICINE

## 2025-04-04 PROCEDURE — 99222 1ST HOSP IP/OBS MODERATE 55: CPT | Performed by: FAMILY MEDICINE

## 2025-04-04 PROCEDURE — 87804 INFLUENZA ASSAY W/OPTIC: CPT | Performed by: EMERGENCY MEDICINE

## 2025-04-04 PROCEDURE — 99285 EMERGENCY DEPT VISIT HI MDM: CPT | Performed by: EMERGENCY MEDICINE

## 2025-04-04 PROCEDURE — 93306 TTE W/DOPPLER COMPLETE: CPT

## 2025-04-04 PROCEDURE — 99285 EMERGENCY DEPT VISIT HI MDM: CPT

## 2025-04-04 PROCEDURE — 87040 BLOOD CULTURE FOR BACTERIA: CPT | Performed by: EMERGENCY MEDICINE

## 2025-04-04 PROCEDURE — 96374 THER/PROPH/DIAG INJ IV PUSH: CPT

## 2025-04-04 PROCEDURE — 84484 ASSAY OF TROPONIN QUANT: CPT | Performed by: FAMILY MEDICINE

## 2025-04-04 RX ORDER — IPRATROPIUM BROMIDE AND ALBUTEROL SULFATE 2.5; .5 MG/3ML; MG/3ML
3 SOLUTION RESPIRATORY (INHALATION) ONCE
Status: COMPLETED | OUTPATIENT
Start: 2025-04-04 | End: 2025-04-04

## 2025-04-04 RX ORDER — METHYLPREDNISOLONE SODIUM SUCCINATE 125 MG/2ML
80 INJECTION, POWDER, LYOPHILIZED, FOR SOLUTION INTRAMUSCULAR; INTRAVENOUS ONCE
Status: COMPLETED | OUTPATIENT
Start: 2025-04-04 | End: 2025-04-04

## 2025-04-04 RX ORDER — GUAIFENESIN/DEXTROMETHORPHAN 100-10MG/5
10 SYRUP ORAL EVERY 4 HOURS PRN
Status: DISCONTINUED | OUTPATIENT
Start: 2025-04-04 | End: 2025-04-08 | Stop reason: HOSPADM

## 2025-04-04 RX ORDER — LEVOFLOXACIN 5 MG/ML
750 INJECTION, SOLUTION INTRAVENOUS ONCE
Status: COMPLETED | OUTPATIENT
Start: 2025-04-04 | End: 2025-04-04

## 2025-04-04 RX ORDER — MULTIVITAMIN
1 TABLET ORAL DAILY
COMMUNITY

## 2025-04-04 RX ORDER — BENZONATATE 100 MG/1
100 CAPSULE ORAL 3 TIMES DAILY PRN
Status: DISCONTINUED | OUTPATIENT
Start: 2025-04-04 | End: 2025-04-08 | Stop reason: HOSPADM

## 2025-04-04 RX ORDER — PRAVASTATIN SODIUM 40 MG
40 TABLET ORAL DAILY
Status: DISCONTINUED | OUTPATIENT
Start: 2025-04-04 | End: 2025-04-08 | Stop reason: HOSPADM

## 2025-04-04 RX ORDER — ALBUTEROL SULFATE 0.83 MG/ML
2.5 SOLUTION RESPIRATORY (INHALATION) EVERY 6 HOURS PRN
Status: DISCONTINUED | OUTPATIENT
Start: 2025-04-04 | End: 2025-04-08 | Stop reason: HOSPADM

## 2025-04-04 RX ORDER — OXYBUTYNIN CHLORIDE 5 MG/1
10 TABLET, EXTENDED RELEASE ORAL
Status: DISCONTINUED | OUTPATIENT
Start: 2025-04-04 | End: 2025-04-08 | Stop reason: HOSPADM

## 2025-04-04 RX ORDER — DORZOLAMIDE HYDROCHLORIDE AND TIMOLOL MALEATE 20; 5 MG/ML; MG/ML
1 SOLUTION/ DROPS OPHTHALMIC 2 TIMES DAILY
Status: DISCONTINUED | OUTPATIENT
Start: 2025-04-04 | End: 2025-04-08 | Stop reason: HOSPADM

## 2025-04-04 RX ADMIN — METHYLPREDNISOLONE SODIUM SUCCINATE 80 MG: 125 INJECTION, POWDER, FOR SOLUTION INTRAMUSCULAR; INTRAVENOUS at 09:40

## 2025-04-04 RX ADMIN — LEVOFLOXACIN 750 MG: 750 INJECTION, SOLUTION INTRAVENOUS at 10:13

## 2025-04-04 RX ADMIN — OXYBUTYNIN 10 MG: 5 TABLET, FILM COATED, EXTENDED RELEASE ORAL at 21:45

## 2025-04-04 RX ADMIN — DORZOLAMIDE HYDROCHLORIDE AND TIMOLOL MALEATE 1 DROP: 20; 5 SOLUTION OPHTHALMIC at 19:00

## 2025-04-04 RX ADMIN — PRAVASTATIN SODIUM 40 MG: 40 TABLET ORAL at 21:45

## 2025-04-04 RX ADMIN — APIXABAN 2.5 MG: 2.5 TABLET, FILM COATED ORAL at 18:59

## 2025-04-04 RX ADMIN — BENZONATATE 100 MG: 100 CAPSULE ORAL at 13:35

## 2025-04-04 RX ADMIN — IPRATROPIUM BROMIDE AND ALBUTEROL SULFATE 3 ML: .5; 3 SOLUTION RESPIRATORY (INHALATION) at 09:41

## 2025-04-04 RX ADMIN — BENZONATATE 100 MG: 100 CAPSULE ORAL at 21:45

## 2025-04-04 RX ADMIN — GUAIFENESIN AND DEXTROMETHORPHAN 10 ML: 20; 200 SYRUP ORAL at 19:51

## 2025-04-04 NOTE — ASSESSMENT & PLAN NOTE
Lab Results   Component Value Date    EGFR 34 04/04/2025    EGFR 34 02/02/2024    EGFR 31 01/13/2024    CREATININE 1.39 (H) 04/04/2025    CREATININE 1.40 (H) 02/02/2024    CREATININE 1.48 (H) 01/13/2024   Presents at renal baseline  Avoid nephrotoxic agents and hypotension  Mindful of renal dysfunction with volume unloading

## 2025-04-04 NOTE — ASSESSMENT & PLAN NOTE
Patient with evidence of mild pulmonary edema on plain film imaging  Have ordered CT chest to better examine lung parenchyma given presentation with transient hypoxia  Received IV diuretic therapy in the ED  However, the patient does not appear overtly volume overloaded  Will assess daily weight/volume status and determine appropriateness of continuing with diuretic therapy intravenously  Reports compliance with oral Lasix in the outpatient setting  2D echocardiogram has been ordered with result pending

## 2025-04-04 NOTE — ED PROVIDER NOTES
Time reflects when diagnosis was documented in both MDM as applicable and the Disposition within this note       Time User Action Codes Description Comment    4/4/2025 11:27 AM Bjorn Paul Add [J44.1] COPD exacerbation (HCC)     4/4/2025 11:27 AM Bjorn Paul [R09.02] Hypoxia           ED Disposition       ED Disposition   Admit    Condition   Stable    Date/Time   Fri Apr 4, 2025 11:27 AM    Comment                  Assessment & Plan       Medical Decision Making  0919: Patient appears well, vital signs reviewed.  Patient in no respiratory distress however noted to be hypoxic.  Patient with diffuse rales, history of pulmonary fibrosis however does not require oxygen use.  Placed on the monitor and oxygen.  Patient responding to nasal cannula oxygen.  Plan to complete basic labs including cardiac enzymes, BNP, send COVID/flu PCR.  Check chest x-ray.  EKG nonischemic.  I will give Solu-Medrol and DuoNeb, reevaluate.    1130: Chest x-ray and labs reviewed.  Discussed with hospitalist for admission.    Amount and/or Complexity of Data Reviewed  Labs: ordered.  Radiology: ordered.     Details: Chest x-ray--diffuse patchy opacities  ECG/medicine tests: ordered and independent interpretation performed.     Details: Normal sinus rhythm 78 bpm, right bundle branch block, no acute ischemia.    Risk  Prescription drug management.  Decision regarding hospitalization.             Medications   apixaban (ELIQUIS) tablet 2.5 mg (has no administration in time range)   oxybutynin (DITROPAN-XL) 24 hr tablet 10 mg (has no administration in time range)   pravastatin (PRAVACHOL) tablet 40 mg (has no administration in time range)   dorzolamide-timolol (COSOPT) 2-0.5 % ophthalmic solution 1 drop (has no administration in time range)   methylPREDNISolone sodium succinate (Solu-MEDROL) injection 80 mg (80 mg Intravenous Given 4/4/25 0940)   ipratropium-albuterol (DUO-NEB) 0.5-2.5 mg/3 mL inhalation solution 3 mL (3 mL  Nebulization Given 4/4/25 3209)   levofloxacin (LEVAQUIN) IVPB (premix in dextrose) 750 mg 150 mL (0 mg Intravenous Stopped 4/4/25 1143)       ED Risk Strat Scores                                                History of Present Illness       Chief Complaint   Patient presents with    Shortness of Breath     Patient brought in by EMS from [place] for sob, back pain and cough. Patient has hx of frequent pneumonia.        Past Medical History:   Diagnosis Date    Chronic pulmonary embolism (HCC)     COPD (chronic obstructive pulmonary disease) (HCC)     Edema     Hyperlipidemia     Hypertension       History reviewed. No pertinent surgical history.   History reviewed. No pertinent family history.   Social History     Tobacco Use    Smoking status: Former    Smokeless tobacco: Never   Vaping Use    Vaping status: Never Used   Substance Use Topics    Alcohol use: Never    Drug use: Never      E-Cigarette/Vaping    E-Cigarette Use Never User       E-Cigarette/Vaping Substances      I have reviewed and agree with the history as documented.       History provided by:  Medical records, patient and EMS personnel  Shortness of Breath  Severity:  Moderate  Onset quality:  Gradual  Duration:  3 days  Timing:  Constant  Progression:  Unchanged  Chronicity:  New  Context comment:  The patient has history of pulmonary fibrosis however not on oxygen, from assisted living, increased cough over the last 3 days, runny nose, no fevers.  Several sick contacts at assisted living.  EMS called for ongoing cough, noted patient to be 87% RA.  Relieved by:  Nothing  Worsened by:  Coughing  Ineffective treatments:  None tried      Review of Systems   Constitutional:  Positive for activity change. Negative for chills and fatigue.   Eyes:  Negative for pain and visual disturbance.   Respiratory:  Positive for shortness of breath.    Cardiovascular:  Negative for palpitations.   Gastrointestinal:  Negative for diarrhea and nausea.   Endocrine:  Negative for polydipsia, polyphagia and polyuria.   Genitourinary:  Negative for difficulty urinating, dysuria, flank pain and hematuria.   Musculoskeletal:  Negative for arthralgias and back pain.   Skin:  Negative for color change.   Allergic/Immunologic: Negative for immunocompromised state.   Neurological:  Negative for dizziness, seizures, syncope and weakness.   Psychiatric/Behavioral:  Negative for confusion and self-injury. The patient is not nervous/anxious.    All other systems reviewed and are negative.          Objective       ED Triage Vitals   Temperature Pulse Blood Pressure Respirations SpO2 Patient Position - Orthostatic VS   04/04/25 0854 04/04/25 0854 04/04/25 0854 04/04/25 0854 04/04/25 0854 --   97.5 °F (36.4 °C) 80 (!) 184/111 22 (!) 88 %       Temp Source Heart Rate Source BP Location FiO2 (%) Pain Score    04/04/25 0854 04/04/25 0854 -- -- 04/04/25 1233    Temporal Monitor   No Pain      Vitals      Date and Time Temp Pulse SpO2 Resp BP Pain Score FACES Pain Rating User   04/04/25 1233 -- -- -- -- -- No Pain -- OY   04/04/25 1233 98 °F (36.7 °C) 77 93 % 20 142/65 -- -- DII   04/04/25 1130 -- 79 94 % 20 141/103 -- -- SB   04/04/25 1100 -- 76 94 % 18 140/81 -- -- SB   04/04/25 1045 -- 83 98 % -- -- -- -- MY   04/04/25 1030 -- 68 98 % -- 120/59 -- -- MY   04/04/25 1015 -- 73 96 % -- 135/67 -- -- MY   04/04/25 1000 -- 55 98 % 18 149/67 -- -- MY   04/04/25 0945 -- 78 98 % -- 143/65 -- -- MY   04/04/25 0930 -- 75 96 % -- -- -- -- MY   04/04/25 0915 -- 72 96 % -- -- -- -- MY   04/04/25 0900 -- 76 90 % 18 164/70 -- --    04/04/25 0857 -- -- 89 % -- 164/70 -- -- AFG   04/04/25 0854 97.5 °F (36.4 °C) 80 88 % 22 184/111 -- -- AFG            Physical Exam  Vitals and nursing note reviewed.   Constitutional:       General: She is not in acute distress.     Appearance: Normal appearance. She is well-developed. She is obese. She is not ill-appearing, toxic-appearing or diaphoretic.   HENT:      Head:  Normocephalic and atraumatic.      Nose: Nose normal. No congestion or rhinorrhea.      Mouth/Throat:      Mouth: Mucous membranes are moist.      Pharynx: Oropharynx is clear. No oropharyngeal exudate or posterior oropharyngeal erythema.   Eyes:      General:         Right eye: No discharge.         Left eye: No discharge.      Comments: Atrophy of the right globe   Cardiovascular:      Rate and Rhythm: Normal rate and regular rhythm.      Pulses: Normal pulses.      Heart sounds: Normal heart sounds. No murmur heard.     No gallop.   Pulmonary:      Effort: Pulmonary effort is normal. No respiratory distress.      Breath sounds: No stridor. Examination of the left-middle field reveals rales. Examination of the right-lower field reveals rales. Examination of the left-lower field reveals rales. Rales present. No wheezing or rhonchi.   Chest:      Chest wall: No tenderness.   Abdominal:      General: Bowel sounds are normal. There is no distension.      Palpations: Abdomen is soft. There is no mass.      Tenderness: There is no abdominal tenderness. There is no right CVA tenderness, left CVA tenderness, guarding or rebound.      Hernia: No hernia is present.   Musculoskeletal:         General: Normal range of motion.      Cervical back: Normal range of motion and neck supple.   Skin:     General: Skin is warm and dry.      Capillary Refill: Capillary refill takes less than 2 seconds.   Neurological:      General: No focal deficit present.      Mental Status: She is alert and oriented to person, place, and time.      Cranial Nerves: No cranial nerve deficit.      Sensory: No sensory deficit.      Motor: No weakness.      Coordination: Coordination normal.      Gait: Gait normal.      Deep Tendon Reflexes: Reflexes normal.   Psychiatric:         Mood and Affect: Mood normal.         Behavior: Behavior normal.         Thought Content: Thought content normal.         Judgment: Judgment normal.         Results Reviewed        Procedure Component Value Units Date/Time    Procalcitonin [[id number]]  (Normal) Collected: 04/04/25 1045    Lab Status: Final result Specimen: Blood from Arm, Left Updated: 04/04/25 1216     Procalcitonin 0.07 ng/ml     HS Troponin I 4hr [[id number]]     Lab Status: No result Specimen: Blood     HS Troponin I 2hr [[id number]]  (Normal) Collected: 04/04/25 1045    Lab Status: Final result Specimen: Blood from Arm, Left Updated: 04/04/25 1114     hs TnI 2hr 43 ng/L      Delta 2hr hsTnI 6 ng/L     Comprehensive metabolic panel [[id number]]  (Abnormal) Collected: 04/04/25 1045    Lab Status: Final result Specimen: Blood from Arm, Left Updated: 04/04/25 1108     Sodium 139 mmol/L      Potassium 3.6 mmol/L      Chloride 101 mmol/L      CO2 27 mmol/L      ANION GAP 11 mmol/L      BUN 31 mg/dL      Creatinine 1.39 mg/dL      Glucose 120 mg/dL      Calcium 8.8 mg/dL      AST 17 U/L      ALT 8 U/L      Alkaline Phosphatase 68 U/L      Total Protein 7.1 g/dL      Albumin 3.7 g/dL      Total Bilirubin 0.79 mg/dL      eGFR 34 ml/min/1.73sq m     Narrative:      National Kidney Disease Foundation guidelines for Chronic Kidney Disease (CKD):     Stage 1 with normal or high GFR (GFR > 90 mL/min/1.73 square meters)    Stage 2 Mild CKD (GFR = 60-89 mL/min/1.73 square meters)    Stage 3A Moderate CKD (GFR = 45-59 mL/min/1.73 square meters)    Stage 3B Moderate CKD (GFR = 30-44 mL/min/1.73 square meters)    Stage 4 Severe CKD (GFR = 15-29 mL/min/1.73 square meters)    Stage 5 End Stage CKD (GFR <15 mL/min/1.73 square meters)  Note: GFR calculation is accurate only with a steady state creatinine    B-Type Natriuretic Peptide(BNP) [[id number]]  (Abnormal) Collected: 04/04/25 0938    Lab Status: Final result Specimen: Blood from Arm, Right Updated: 04/04/25 1037      pg/mL     HS Troponin 0hr (reflex protocol) [[id number]]  (Normal) Collected: 04/04/25 0938    Lab Status: Final result Specimen: Blood from Arm, Right Updated:  04/04/25 1012     hs TnI 0hr 37 ng/L     FLU/COVID Rapid Antigen (30 min. TAT) - Preferred screening test in ED [[id number]]  (Normal) Collected: 04/04/25 0938    Lab Status: Final result Specimen: Nares from Nose Updated: 04/04/25 1005     SARS COV Rapid Antigen Negative     Influenza A Rapid Antigen Negative     Influenza B Rapid Antigen Negative    Narrative:      This test has been performed using the [employer] Yashira 2 FLU+SARS Antigen test under the Emergency Use Authorization (EUA). This test has been validated by the  and verified by the performing laboratory. The Yashira uses lateral flow immunofluorescent sandwich assay to detect SARS-COV, Influenza A and Influenza B Antigen.     The [employer] Yashira 2 SARS Antigen test does not differentiate between SARS-CoV and SARS-CoV-2.     Negative results are presumptive and may be confirmed with a molecular assay, if necessary, for patient management. Negative results do not rule out SARS-CoV-2 or influenza infection and should not be used as the sole basis for treatment or patient management decisions. A negative test result may occur if the level of antigen in a sample is below the limit of detection of this test.     Positive results are indicative of the presence of viral antigens, but do not rule out bacterial infection or co-infection with other viruses.     All test results should be used as an adjunct to clinical observations and other information available to the provider.    FOR PEDIATRIC PATIENTS - copy/paste COVID Guidelines URL to browser: [email or web address]    Lactic acid, plasma (w/reflex if result > 2.0) [[id number]]  (Normal) Collected: 04/04/25 0938    Lab Status: Final result Specimen: Blood from Arm, Right Updated: 04/04/25 1004     LACTIC ACID 1.3 mmol/L     Narrative:      Result may be elevated if tourniquet was used during collection.    CBC and differential [[id number]]  (Abnormal)  Collected: 04/04/25 0938    Lab Status: Final result Specimen: Blood from Arm, Right Updated: 04/04/25 1000     WBC 8.86 Thousand/uL      RBC 4.14 Million/uL      Hemoglobin 13.8 g/dL      Hematocrit 41.8 %       fL      MCH 33.3 pg      MCHC 33.0 g/dL      RDW 14.3 %      MPV 9.6 fL      Platelets 182 Thousands/uL      nRBC 0 /100 WBCs      Segmented % 69 %      Immature Grans % 0 %      Lymphocytes % 15 %      Monocytes % 14 %      Eosinophils Relative 1 %      Basophils Relative 1 %      Absolute Neutrophils 6.21 Thousands/µL      Absolute Immature Grans 0.03 Thousand/uL      Absolute Lymphocytes 1.29 Thousands/µL      Absolute Monocytes 1.20 Thousand/µL      Eosinophils Absolute 0.08 Thousand/µL      Basophils Absolute 0.05 Thousands/µL     Blood gas, venous [[id number]]  (Abnormal) Collected: 04/04/25 0938    Lab Status: Final result Specimen: Blood from Arm, Right Updated: 04/04/25 0957     pH, Abraham 7.411     pCO2, Abraham 42.5 mm Hg      pO2, Abraham 53.4 mm Hg      HCO3, Abraham 26.4 mmol/L      Base Excess, Abraham 1.5 mmol/L      O2 Content, Abraham 17.8 ml/dL      O2 HGB, VENOUS 85.3 %     Blood culture #2 [[id number]] Collected: 04/04/25 0938    Lab Status: In process Specimen: Blood from Arm, Left Updated: 04/04/25 0945    Blood culture #1 [[id number]] Collected: 04/04/25 0937    Lab Status: In process Specimen: Blood from Arm, Right Updated: 04/04/25 0945            XR chest 1 view portable   Final Interpretation by Geovani Law MD (04/04 1053)      Reticular opacities correlating to pulmonary fibrosis.      Mild pulmonary vascular congestion.            Workstation performed: [id number]         CT chest wo contrast    (Results Pending)       Procedures    ED Medication and Procedure Management   Prior to Admission Medications   Prescriptions Last Dose Informant Patient Reported? Taking?   Pseudoephedrine-guaiFENesin (GUAIFENESIN/PSE SA PO)   Yes No   Sig: Take 5 mL by mouth every 6 (six) hours as  needed   albuterol (ProAir HFA) 90 mcg/act inhaler   No No   Sig: Inhale 2 puffs every 6 (six) hours as needed for wheezing   apixaban (Eliquis) 2.5 mg   Yes Yes   Sig: Take by mouth 2 (two) times a day   furosemide (LASIX) 40 mg tablet   No No   Sig: Take 1 tablet (40 mg total) by mouth daily   ipratropium-albuterol (DUO-NEB) 0.5-2.5 mg/3 mL nebulizer solution   Yes No   ondansetron (Zofran ODT) 4 mg disintegrating tablet   No No   Sig: Take 1 tablet (4 mg total) by mouth every 6 (six) hours as needed for nausea or vomiting   oxybutynin (DITROPAN-XL) 10 MG 24 hr tablet   Yes Yes   Sig: Take 10 mg by mouth daily at bedtime   pravastatin (PRAVACHOL) 40 mg tablet   Yes Yes   Sig: Take 40 mg by mouth daily   timolol (BETIMOL) 0.5 % ophthalmic solution   Yes Yes   Sig: Administer 1 drop into the left eye daily at bedtime     zinc oxide 20 % ointment   Yes No   Sig: Apply topically 2 (two) times a day      Facility-Administered Medications: None     Current Discharge Medication List        CONTINUE these medications which have NOT CHANGED    Details   apixaban (Eliquis) 2.5 mg Take by mouth 2 (two) times a day      oxybutynin (DITROPAN-XL) 10 MG 24 hr tablet Take 10 mg by mouth daily at bedtime      pravastatin (PRAVACHOL) 40 mg tablet Take 40 mg by mouth daily      timolol (BETIMOL) 0.5 % ophthalmic solution Administer 1 drop into the left eye daily at bedtime        albuterol (ProAir HFA) 90 mcg/act inhaler Inhale 2 puffs every 6 (six) hours as needed for wheezing  Qty: 8.5 g, Refills: 0    Comments: Substitution to a formulary equivalent within the same pharmaceutical class is authorized.  Associated Diagnoses: Bronchospasm      furosemide (LASIX) 40 mg tablet Take 1 tablet (40 mg total) by mouth daily    Associated Diagnoses: Lymphedema      ipratropium-albuterol (DUO-NEB) 0.5-2.5 mg/3 mL nebulizer solution       ondansetron (Zofran ODT) 4 mg disintegrating tablet Take 1 tablet (4 mg total) by mouth every 6 (six)  hours as needed for nausea or vomiting  Qty: 20 tablet, Refills: 0    Associated Diagnoses: Nausea vomiting and diarrhea      Pseudoephedrine-guaiFENesin (GUAIFENESIN/PSE SA PO) Take 5 mL by mouth every 6 (six) hours as needed      zinc oxide 20 % ointment Apply topically 2 (two) times a day           No discharge procedures on file.  ED SEPSIS DOCUMENTATION   Time reflects when diagnosis was documented in both MDM as applicable and the Disposition within this note       Time User Action Codes Description Comment    4/4/2025 11:27 AM Bjorn Paul Add [J44.1] COPD exacerbation (HCC)     4/4/2025 11:27 AM Bjorn Paul Add [R09.02] Hypoxia                  Bjorn Paul MD  04/04/25 5238

## 2025-04-04 NOTE — ASSESSMENT & PLAN NOTE
Patient with longstanding history of pulmonary fibrosis  Not oxygen but at baseline  Plan pulm imaging suggestive of pulmonary fibrosis-doubt infectious infiltrates  It is possible the patient has evolved some degree of chronic hypoxia given advanced underlying findings  Will treat for component of mild pulmonary edema and monitor for improvement

## 2025-04-04 NOTE — H&P
"H&P - Hospitalist   Name: Fang Victor 89 y.o. female I MRN: [id number]  Unit/Bed#: -01 I Date of Admission: 4/4/2025   Date of Service: 4/4/2025 I Hospital Day: 0     Assessment & Plan  Acute respiratory failure with hypoxia (HCC)  Patient presented from [facility] today (4/4) with reports of shortness of breath and cough  Patient denies any symptoms and states that she feels \"like she always does\"  Apparently, per SNF report, transient desaturations prompting referral to emergency department    In ED, without SIRS criteria, hemodynamically stable, with apparent desaturation to 88% requiring initiation of 2 L nasal cannula  Subsequent evaluation included plain film chest x-ray which was suggestive of mild pulmonary edema  Patient carries history of pulmonary fibrosis but is not oxygen dependent at baseline  COVID, flu, RSV negative  Received antibiotics in the ED    Plan:  Index suspicion for transient hypoxia as the result of mild pulmonary edema versus persistent/progressive pulmonary fibrosis  Received intravenous loop diuretic therapy in the ED-hold off on further diuretic therapy pending respiratory status  At the time of my evaluation, 98% on 2 L  COVID, flu, RSV negative  Low index of suspicion for active infection/pneumonia given absence of SIRS criteria and lack of discrete infiltrates on plain film  Will better characterize lung parenchyma with CT scan-pending  See plans below  Wean oxygen to off  Pulmonary fibrosis (HCC)  Patient with longstanding history of pulmonary fibrosis  Not oxygen but at baseline  Plan pulm imaging suggestive of pulmonary fibrosis-doubt infectious infiltrates  It is possible the patient has evolved some degree of chronic hypoxia given advanced underlying findings  Will treat for component of mild pulmonary edema and monitor for improvement    Pulmonary edema  Patient with evidence of mild pulmonary edema on plain film imaging  Have ordered CT chest to better examine lung " parenchyma given presentation with transient hypoxia  Received IV diuretic therapy in the ED  However, the patient does not appear overtly volume overloaded  Will assess daily weight/volume status and determine appropriateness of continuing with diuretic therapy intravenously  Reports compliance with oral Lasix in the outpatient setting  2D echocardiogram has been ordered with result pending  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 Sequential Compression Devices Ordered.  Code Status: Level 3 - DNAR and DNI   Discussion with family: Patient declined call to .     Anticipated Length of Stay: Patient will be admitted on an observation basis with an anticipated length of stay of less than 2 midnights secondary to transient hypoxia.    History of Present Illness   Chief Complaint: Patient without complaint    Fang Victor is a 89 y.o. female with a PMH of pulmonary fibrosis, hypertension, hyperlipidemia, obesity, who presents from skilled nursing facility with reports of dyspnea and apparent hypoxia.    The patient is actually without complaint on presentation.  Apparently, patient found with saturations in the 80s on room air and experiencing cough per SNF record.    In ED, patient without SIRS criteria, hemodynamically stable, with desaturation to 88% on room air-2 L nasal cannula initiated with adequate saturation.    Subsequent workup included  plain film of the chest which was suggestive of mild pulmonary edema and pulmonary fibrosis.  In ED, received antibiotics and IV diuretics.    Referred for admission secondary to acute hypoxemic respiratory failure with index of suspicion for mild pulmonary edema versus advanced pulmonary fibrosis disorders.    Review of Systems   All other systems reviewed and are negative.      Historical Information   Past Medical History:   Diagnosis Date    Chronic pulmonary embolism (HCC)     COPD (chronic obstructive pulmonary disease) (HCC)     Edema     Hyperlipidemia     Hypertension      History reviewed. No pertinent surgical history.  Social History     Tobacco Use    Smoking status: Former    Smokeless tobacco: Never   Vaping Use    Vaping status: Never Used   Substance and Sexual Activity    Alcohol use: Never    Drug use: Never    Sexual activity: Not on file     E-Cigarette/Vaping    E-Cigarette Use Never User      E-Cigarette/Vaping Substances     Family history non-contributory  Social History:  Marital Status: Unknown     Meds/Allergies   I have reviewed home medications with patient personally.  Prior to Admission medications    Medication Sig Start Date End Date Taking? Authorizing Provider   apixaban (Eliquis) 2.5 mg Take by mouth 2 (two) times a day   Yes Historical Provider, MD   furosemide (LASIX) 40 mg tablet Take 1 tablet (40 mg total) by mouth daily 1/13/24  Yes Nicole Galeana MD   Multiple Vitamin (multivitamin) tablet Take 1 tablet by mouth daily   Yes Historical Provider, MD   oxybutynin (DITROPAN-XL) 10 MG 24 hr tablet Take 10 mg by mouth daily at bedtime   Yes Historical Provider, MD   pravastatin (PRAVACHOL) 40 mg tablet Take 40 mg by mouth daily at bedtime   Yes Historical Provider, MD   Pseudoephedrine-guaiFENesin (GUAIFENESIN/PSE SA PO) Take 5 mL by mouth every 6 (six) hours as needed   Yes Historical Provider, MD   timolol (BETIMOL) 0.5 % ophthalmic solution Administer 1 drop into the left  eye daily at bedtime     Yes Historical Provider, MD   albuterol (ProAir HFA) 90 mcg/act inhaler Inhale 2 puffs every 6 (six) hours as needed for wheezing  Patient not taking: Reported on 4/4/2025 2/23/22   Saran Munoz PA-C   ipratropium-albuterol (DUO-NEB) 0.5-2.5 mg/3 mL nebulizer solution  10/11/24   Historical Provider, MD   ondansetron (Zofran ODT) 4 mg disintegrating tablet Take 1 tablet (4 mg total) by mouth every 6 (six) hours as needed for nausea or vomiting  Patient not taking: Reported on 4/4/2025 2/23/22   Saran Munoz PA-C   zinc oxide 20 % ointment Apply topically 2 (two) times a day  Patient not taking: Reported on 4/4/2025    Historical Provider, MD     Allergies   Allergen Reactions    Codeine Itching    Cephalosporins Rash    Penicillins Rash    Sulfa Antibiotics Rash       Objective :  Temp:  [97.5 °F (36.4 °C)-98 °F (36.7 °C)] 98 °F (36.7 °C)  HR:  [55-83] 81  BP: (120-184)/() 121/92  Resp:  [18-22] 19  SpO2:  [88 %-98 %] 95 %  O2 Device: Nasal cannula  Nasal Cannula O2 Flow Rate (L/min):  [2 L/min] 2 L/min    Physical Exam  Constitutional:       General: She is not in acute distress.     Appearance: She is obese. She is not ill-appearing, toxic-appearing or diaphoretic.   HENT:      Head: Normocephalic and atraumatic.      Right Ear: External ear normal. There is no impacted cerumen.      Left Ear: External ear normal.      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 No congestion.      Mouth/Throat:      Pharynx: Oropharynx is clear.   Eyes:      General: No scleral icterus.     Conjunctiva/sclera: Conjunctivae normal.   Pulmonary:      Breath sounds: No stridor. Wheezing and rales present. No rhonchi.   Chest:      Chest wall: No tenderness.   Abdominal:      Tenderness: There is no right CVA tenderness, left CVA tenderness or rebound.   Musculoskeletal:      Right lower leg: No edema.      Left lower leg: No edema.   Skin:     Capillary Refill:  "Capillary refill takes less than 2 seconds.      Findings: No lesion.   Neurological:      General: No focal deficit present.      Mental Status: She is alert and oriented to person, place, and time. Mental status is at baseline.   Psychiatric:         Mood and Affect: Mood normal.         Behavior: Behavior normal.         Thought Content: Thought content normal.         Judgment: Judgment normal.            Lines/Drains:            Lab Results: I have reviewed the following results:  Results from last 7 days   Lab Units 04/04/25  0938   WBC Thousand/uL 8.86   HEMOGLOBIN g/dL 13.8   HEMATOCRIT % 41.8   PLATELETS Thousands/uL 182   SEGS PCT % 69   LYMPHO PCT % 15   MONO PCT % 14*   EOS PCT % 1     Results from last 7 days   Lab Units 04/04/25  1045   SODIUM mmol/L 139   POTASSIUM mmol/L 3.6   CHLORIDE mmol/L 101   CO2 mmol/L 27   BUN mg/dL 31*   CREATININE mg/dL 1.39*   ANION GAP mmol/L 11   CALCIUM mg/dL 8.8   ALBUMIN g/dL 3.7   TOTAL BILIRUBIN mg/dL 0.79   ALK PHOS U/L 68   ALT U/L 8   AST U/L 17   GLUCOSE RANDOM mg/dL 120             No results found for: \"HGBA1C\"  Results from last 7 days   Lab Units 04/04/25  1045 04/04/25  0938   LACTIC ACID mmol/L  --  1.3   PROCALCITONIN ng/ml 0.07  --        Imaging Results Review: I reviewed radiology reports from this admission including: chest xray.  Other Study Results Review: EKG was reviewed.     Administrative Statements   I have spent a total time of 45 minutes in caring for this patient on the day of the visit/encounter including Risks and benefits of tx options, Importance of tx compliance, Risk factor reductions, Impressions, Counseling / Coordination of care, and Reviewing/placing orders in the medical record (including tests, medications, and/or procedures).    ** Please Note: This note has been constructed using a voice recognition system. **    "

## 2025-04-04 NOTE — ASSESSMENT & PLAN NOTE
"Patient presented from SNF today (4/4) with reports of shortness of breath and cough  Patient denies any symptoms and states that she feels \"like she always does\"  Apparently, per [facility] report, transient desaturations prompting referral to emergency department    In ED, without SIRS criteria, hemodynamically stable, with apparent desaturation to 88% requiring initiation of 2 L nasal cannula  Subsequent evaluation included plain film chest x-ray which was suggestive of mild pulmonary edema  Patient carries history of pulmonary fibrosis but is not oxygen dependent at baseline  COVID, flu, RSV negative  Received antibiotics in the ED    Plan:  Index suspicion for transient hypoxia as the result of mild pulmonary edema versus persistent/progressive pulmonary fibrosis  Received intravenous loop diuretic therapy in the ED-hold off on further diuretic therapy pending respiratory status  At the time of my evaluation, 98% on 2 L  COVID, flu, RSV negative  Low index of suspicion for active infection/pneumonia given absence of SIRS criteria and lack of discrete infiltrates on plain film  Will better characterize lung parenchyma with CT scan-pending  See plans below  Wean oxygen to off  "

## 2025-04-05 LAB
ALBUMIN SERPL BCG-MCNC: 3.3 G/DL (ref 3.5–5)
ALP SERPL-CCNC: 60 U/L (ref 34–104)
ALT SERPL W P-5'-P-CCNC: 7 U/L (ref 7–52)
ANION GAP SERPL CALCULATED.3IONS-SCNC: 9 MMOL/L (ref 4–13)
AST SERPL W P-5'-P-CCNC: 18 U/L (ref 13–39)
BILIRUB SERPL-MCNC: 0.48 MG/DL (ref 0.2–1)
BUN SERPL-MCNC: 33 MG/DL (ref 5–25)
CALCIUM ALBUM COR SERPL-MCNC: 8.8 MG/DL (ref 8.3–10.1)
CALCIUM SERPL-MCNC: 8.2 MG/DL (ref 8.4–10.2)
CHLORIDE SERPL-SCNC: 103 MMOL/L (ref 96–108)
CO2 SERPL-SCNC: 25 MMOL/L (ref 21–32)
CREAT SERPL-MCNC: 1.41 MG/DL (ref 0.6–1.3)
ERYTHROCYTE [DISTWIDTH] IN BLOOD BY AUTOMATED COUNT: 13.9 % (ref 11.6–15.1)
GFR SERPL CREATININE-BSD FRML MDRD: 33 ML/MIN/1.73SQ M
GLUCOSE P FAST SERPL-MCNC: 114 MG/DL (ref 65–99)
GLUCOSE SERPL-MCNC: 114 MG/DL (ref 65–140)
HCT VFR BLD AUTO: 37.6 % (ref 34.8–46.1)
HGB BLD-MCNC: 12.3 G/DL (ref 11.5–15.4)
MAGNESIUM SERPL-MCNC: 2.1 MG/DL (ref 1.9–2.7)
MCH RBC QN AUTO: 32.6 PG (ref 26.8–34.3)
MCHC RBC AUTO-ENTMCNC: 32.7 G/DL (ref 31.4–37.4)
MCV RBC AUTO: 100 FL (ref 82–98)
MRSA NOSE QL CULT: NORMAL
PHOSPHATE SERPL-MCNC: 3.6 MG/DL (ref 2.3–4.1)
PLATELET # BLD AUTO: 175 THOUSANDS/UL (ref 149–390)
PMV BLD AUTO: 9.4 FL (ref 8.9–12.7)
POTASSIUM SERPL-SCNC: 3.7 MMOL/L (ref 3.5–5.3)
PROT SERPL-MCNC: 6.3 G/DL (ref 6.4–8.4)
RBC # BLD AUTO: 3.77 MILLION/UL (ref 3.81–5.12)
SODIUM SERPL-SCNC: 137 MMOL/L (ref 135–147)
WBC # BLD AUTO: 7.58 THOUSAND/UL (ref 4.31–10.16)

## 2025-04-05 PROCEDURE — 84100 ASSAY OF PHOSPHORUS: CPT | Performed by: FAMILY MEDICINE

## 2025-04-05 PROCEDURE — 97163 PT EVAL HIGH COMPLEX 45 MIN: CPT | Performed by: PHYSICAL THERAPIST

## 2025-04-05 PROCEDURE — 80053 COMPREHEN METABOLIC PANEL: CPT | Performed by: FAMILY MEDICINE

## 2025-04-05 PROCEDURE — 83735 ASSAY OF MAGNESIUM: CPT | Performed by: FAMILY MEDICINE

## 2025-04-05 PROCEDURE — 85027 COMPLETE CBC AUTOMATED: CPT | Performed by: FAMILY MEDICINE

## 2025-04-05 PROCEDURE — 99232 SBSQ HOSP IP/OBS MODERATE 35: CPT | Performed by: FAMILY MEDICINE

## 2025-04-05 PROCEDURE — 97167 OT EVAL HIGH COMPLEX 60 MIN: CPT

## 2025-04-05 PROCEDURE — 97535 SELF CARE MNGMENT TRAINING: CPT

## 2025-04-05 RX ORDER — FUROSEMIDE 10 MG/ML
40 INJECTION INTRAMUSCULAR; INTRAVENOUS ONCE
Status: COMPLETED | OUTPATIENT
Start: 2025-04-05 | End: 2025-04-05

## 2025-04-05 RX ADMIN — GUAIFENESIN AND DEXTROMETHORPHAN 10 ML: 20; 200 SYRUP ORAL at 21:37

## 2025-04-05 RX ADMIN — DORZOLAMIDE HYDROCHLORIDE AND TIMOLOL MALEATE 1 DROP: 20; 5 SOLUTION OPHTHALMIC at 17:22

## 2025-04-05 RX ADMIN — APIXABAN 2.5 MG: 2.5 TABLET, FILM COATED ORAL at 08:09

## 2025-04-05 RX ADMIN — PRAVASTATIN SODIUM 40 MG: 40 TABLET ORAL at 21:37

## 2025-04-05 RX ADMIN — OXYBUTYNIN 10 MG: 5 TABLET, FILM COATED, EXTENDED RELEASE ORAL at 21:37

## 2025-04-05 RX ADMIN — DORZOLAMIDE HYDROCHLORIDE AND TIMOLOL MALEATE 1 DROP: 20; 5 SOLUTION OPHTHALMIC at 08:09

## 2025-04-05 RX ADMIN — BENZONATATE 100 MG: 100 CAPSULE ORAL at 10:15

## 2025-04-05 RX ADMIN — GUAIFENESIN AND DEXTROMETHORPHAN 10 ML: 20; 200 SYRUP ORAL at 15:00

## 2025-04-05 RX ADMIN — APIXABAN 2.5 MG: 2.5 TABLET, FILM COATED ORAL at 17:22

## 2025-04-05 RX ADMIN — FUROSEMIDE 40 MG: 10 INJECTION, SOLUTION INTRAVENOUS at 09:57

## 2025-04-05 NOTE — ASSESSMENT & PLAN NOTE
Patient with evidence of mild pulmonary edema on plain film imaging  Have ordered CT chest to better examine lung parenchyma given presentation with transient hypoxia  Will assess daily weight/volume status and determine appropriateness of continuing with diuretic therapy intravenously  Today (4/5) proceed with intravenous diuretic therapy-Lasix IV 40 mg IV x 1  Reports compliance with oral Lasix in the outpatient setting  2D echocardiogram (4/4) preserved ejection fraction without evidence of overt diastolic dysfunction

## 2025-04-05 NOTE — OCCUPATIONAL THERAPY NOTE
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 No pertinent surgical history.          04/05/25 0903   OT Last Visit   OT Visit Date 04/05/25   Note Type   Note type Evaluation   Pain Assessment   Pain Assessment Tool 0-10   Pain Score No Pain   Restrictions/Precautions   Weight Bearing Precautions Per Order No   Other Precautions Chair Alarm;Bed Alarm;Visual impairment;O2;Fall Risk  (On 1 LPM upon therapy arrival, weaned to RA during this session)   Home Living   Type of Home Assisted living  ([facility])   Home Layout One level;Performs ADLs on one level;Able to live on main level with bedroom/bathroom   Bathroom Shower/Tub   (shower room accessible via wheeled shower chair)   Bathroom Toilet Standard  (over toilet commode over the toilet)   Bathroom Equipment Other (Comment);Hand-held shower;Grab bars around toilet;Commode  (wheeled shower chair)   Bathroom Accessibility Accessible via wheelchair   Home Equipment Walker;Wheelchair-manual;Other (Comment)  (adjustable matress with elevating legs and head)   Additional Comments Pt takes wheelchair to and from the dining room, but walks to and from the physical therapy gym with a rolling walker and a wheelchair follow \"around 150 feet taking a few breaks\"   Prior Function   Level of [place] Modified independent with wheelchair;Needs assistance with ADLs;Needs assistance with IADLS  (needs assistance with showering)   Lives With Facility staff   Receives " Help From Other (Comment)  (facility staff)   IADLs Family/Friend/Other provides transportation;Family/Friend/Other provides meals;Family/Friend/Other provides medication management   Falls in the last 6 months 0   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   Transfers   Sit to Stand 3  Moderate assistance   Additional items Assist x 1;Increased time required;Verbal cues   Stand to Sit 4  Minimal assistance   Additional items Assist x 1;Increased time required;Verbal cues   Stand pivot 3  Moderate assistance   Additional items Assist x 1;Increased time required;Verbal cues   Additional Comments Pt walked short household distance with Min-Mod A and ww.   Functional Mobility   Functional Mobility 3  Moderate assistance   Additional Comments Pt walked short household distance with Min-Mod A and ww. Pt reports she would be able to complete toileting independently. Pt stood with bedrail and simulated stand pivot transfer, hygiene, and clothing management with SPV using simulated grab bar for stability.   Balance   Static Sitting Fair   Dynamic Sitting Poor +   Static Standing Poor +   Dynamic Standing Poor   Activity Tolerance   Activity Tolerance Patient limited by fatigue   Medical Staff Made Aware PT Tyshawn   Nurse Made Aware FRITZ CHILDERS Assessment   RUE Assessment WFL  (strength grossly 4-/5)   LUE Assessment   LUE Assessment X  (shoulder flexion ROM 0-70; strength otherwise grossly 4-/5)   Hand Function   Gross Motor Coordination Functional   Fine Motor Coordination Functional   Hand Function Comments L Hand Dominant   Vision -  Complex Assessment   Additional Comments no functional vision in R eye   Psychosocial   Psychosocial (WDL) WDL   Cognition   Overall Cognitive Status WFL   Arousal/Participation Alert;Responsive;Cooperative   Attention Attends with cues to redirect   Orientation Level Oriented X4   Memory Within functional limits   Following Commands Follows one step commands without difficulty   Assessment   Limitation Decreased ADL status;Decreased UE ROM;Decreased UE strength;Decreased Safe judgement during ADL;Decreased endurance;Decreased self-care trans;Decreased high-level ADLs   Prognosis Good   Assessment Pt is a 89 y.o. female, admitted to [facility] 4/4/2025 d/t experiencing desaturation on room air to 88%. Dx: acute respiratory failure with hypoxia. Pt with PMHx impacting their performance during ADL tasks, including: lymphedema, chronic pulmonary embolism, obesity. Prior to admission to the hospital Pt was performing ADLs without physical assistance. IADLs with physical assistance. Functional transfers/ambulation with physical assistance. Cognitive status was PTA was WFL. OT order placed to assess Pt's ADLs, cognitive status, and performance during functional tasks in order to maximize safety and independence while making most appropriate d/c recommendations. PT/OT co-evaluation completed at this time d/t significant mobility deficits and safety concerns. Pt's clinical presentation is currently unstable/unpredictable given new onset deficits that effect Pt's occupational performance and ability to safely return to PLOF including decrease activity tolerance, decrease standing balance, decrease sitting balance, decrease performance during ADL tasks, decrease safety awareness , decrease BUE ROM, decrease generalized strength, decrease activity engagement, decrease performance during functional transfers, and limited insight to deficits combined with medical complications of abnormal CBC, new onset O2 use, edema/swelling,  incontinence, and need for input for mobility technique/safety. Personal factors affecting Pt at time of initial evaluation include: advanced age, inability to perform ADLs, inability to ambulate household distances, inability to navigate community distances, limited insight into impairments, and decreased initiation and engagement. Pt will benefit from continued skilled OT services to address deficits as defined above and to maximize level independence/participation during ADLs and functional tasks to facilitate return toward PLOF and improved quality of life. From an occupational therapy standpoint, recommendation at time of d/c would be Level III: Minimum Resource Intensity.   Plan   Treatment Interventions ADL retraining;Visual perceptual retraining;Functional transfer training;UE strengthening/ROM;Endurance training;Patient/family training;Equipment evaluation/education;Compensatory technique education;Energy conservation;Activityengagement   Goal Expiration Date 04/19/25   OT Treatment Day 1   OT Frequency 2-3x/wk   Discharge Recommendation   Rehab Resource Intensity Level, OT III (Minimum Resource Intensity)   AM-PAC Daily Activity Inpatient   Lower Body Dressing 2   Bathing 3   Toileting 3   Upper Body Dressing 3   Grooming 3   Eating 4   Daily Activity Raw Score 18   Daily Activity Standardized Score (Calc for Raw Score >=11) 38.66   AM-PAC Applied Cognition Inpatient   Following a Speech/Presentation 4   Understanding Ordinary Conversation 4   Taking Medications 2   Remembering Where Things Are Placed or Put Away 3   Remembering List of 4-5 Errands 3   Taking Care of Complicated Tasks 3   Applied Cognition Raw Score 19   Applied Cognition Standardized Score 39.77   Additional Treatment Session   Start Time 0946   End Time 0956   Treatment Assessment Treatment session initiated with pt sitting in recliner. Pt reports she would be able to complete toileting independently. Pt stood with bedrail and simulated  stand pivot transfer, hygiene, and clothing management with SPV using simulated grab bar for stability. Pt turned and sat in recliner with SPV. Pt required Max A x2 to reposition in recliner due to size of recliner and her feet not reaching to the ground. Pt remained in recliner at the end of the session. Continue with plan of care.   Additional Treatment Day 1   End of Consult   Education Provided Yes   Patient Position at End of Consult Bedside chair;Other (comment)  (with PT for continued evaluation)   Nurse Communication Nurse aware of consult        04/05/25 0909 04/05/25 0919 04/05/25 0929   Vitals   Pulse 83 83 67   Oxygen Therapy   SpO2 94 % 92 % 90 %   SpO2 Activity At Rest At Rest During Exercise   O2 Device Nasal cannula None (Room air) None (Room air)   Nasal Cannula O2 Flow Rate (L/min) 1 L/min  --   --    Calculated FIO2 (%) - Nasal Cannula 24  --   --       04/05/25 0945 04/05/25 0946   Vitals   Pulse 92 87   Oxygen Therapy   SpO2 91 % (!) 84 %   SpO2 Activity During Exercise During Exercise   O2 Device None (Room air) None (Room air)   Nasal Cannula O2 Flow Rate (L/min)  --   --    Calculated FIO2 (%) - Nasal Cannula  --   --      The patient's raw score on the AM-PAC Daily Activity Inpatient Short Form is 18. A raw score of less than 19 suggests the patient may benefit from discharge to post-acute rehabilitation services. However, the patient is functioning near her basline and is appropriate to return to [facility] due to her access to care and set-up of her apartment. Please refer to the recommendation of the Occupational Therapist for safe discharge planning.    Pt goals to be met by 4/19/2025    Pt will demonstrate ability to complete grooming/hygiene tasks @ independent after set-up.  Pt will demonstrate ability to complete supine<>sit @ modified independent in order to increase safety and independence during ADL tasks.  Pt will demonstrate ability to complete UB ADLs including  washing/dressing @ independent in order to increase performance and participation during meaningful tasks  Pt will demonstrate ability to complete LB dressing @ modified independent in order to increase safety and independence during meaningful tasks.   Pt will demonstrate ability to complete toileting tasks including CM and pericare @ modified independent in order to increase safety and independence during meaningful tasks.  Pt will demonstrate ability to complete EOB, chair, toilet/commode transfers @ modified independent in order to increase performance and participation during functional tasks.  Pt will demonstrate ability to stand for 3 minutes while maintaining fair+ balance with use of a grab bar for UB support PRN.  Pt will demonstrate ability to tolerate 30-35 minute OT session with no vc'ing for deep breathing or use of energy conservation techniques in order to increase activity tolerance during functional tasks.   Pt will demonstrate Good carryover of use of energy conservation/compensatory strategies during ADLs and functional tasks in order to increase safety and reduce risk for falls.   Pt will demonstrate Good attention and participation in continued evaluation of functional ambulation house hold distances in order to assist with safe d/c planning.  Pt will attend to continued cognitive assessments 100% of the time in order to provide most appropriate d/c recommendations.   Pt will follow 100% simple 2-step commands and be A&O x4 consistently with environmental cues to increase participation in functional activities.   Pt will identify 3 areas of interest/hobbies and 1 intervention on how to incorporate into daily life in order to increase interaction with environment and peers as well as increase participation in meaningful tasks.   Pt will demonstrate 100% carryover of BUE HEP in order to increase BUE MS and increase performance during functional tasks upon d/c home.    Jorge Ambrocio, OTR/L

## 2025-04-05 NOTE — ASSESSMENT & PLAN NOTE
Lab Results   Component Value Date    EGFR 33 04/05/2025    EGFR 34 04/04/2025    EGFR 34 02/02/2024    CREATININE 1.41 (H) 04/05/2025    CREATININE 1.39 (H) 04/04/2025    CREATININE 1.40 (H) 02/02/2024   Presents at renal baseline  Avoid nephrotoxic agents and hypotension  Mindful of renal dysfunction with volume unloading

## 2025-04-05 NOTE — PLAN OF CARE
Problem: Prexisting or High Potential for Compromised Skin Integrity  Goal: Skin integrity is maintained or improved  Description: INTERVENTIONS:- Identify patients at risk for skin breakdown- Assess and monitor skin integrity- Assess and monitor nutrition and hydration status- Monitor labs - Assess for incontinence - Turn and reposition patient- Assist with mobility/ambulation- Relieve pressure over bony prominences- Avoid friction and shearing- Provide appropriate hygiene as needed including keeping skin clean and dry- Evaluate need for skin moisturizer/barrier cream- Collaborate with interdisciplinary team - Patient/family teaching- Consider wound care consult   Outcome: Progressing     Problem: PAIN - ADULT  Goal: Verbalizes/displays adequate comfort level or baseline comfort level  Description: Interventions:- Encourage patient to monitor pain and request assistance- Assess pain using appropriate pain scale- Administer analgesics based on type and severity of pain and evaluate response- Implement non-pharmacological measures as appropriate and evaluate response- Consider cultural and social influences on pain and pain management- Notify physician/advanced practitioner if interventions unsuccessful or patient reports new pain  Outcome: Progressing     Problem: INFECTION - ADULT  Goal: Absence or prevention of progression during hospitalization  Description: INTERVENTIONS:- Assess and monitor for signs and symptoms of infection- Monitor lab/diagnostic results- Monitor all insertion sites, i.e. indwelling lines, tubes, and drains- Monitor endotracheal if appropriate and nasal secretions for changes in amount and color- [place] appropriate cooling/warming therapies per order- Administer medications as ordered- Instruct and encourage patient and family to use good hand hygiene technique- Identify and instruct in appropriate isolation precautions for identified infection/condition  Outcome: Progressing  Goal:  Absence of fever/infection during neutropenic period  Description: INTERVENTIONS:- Monitor WBC  Outcome: Progressing     Problem: SAFETY ADULT  Goal: Patient will remain free of falls  Description: INTERVENTIONS:- Educate patient/family on patient safety including physical limitations- Instruct patient to call for assistance with activity - Consult OT/PT to assist with strengthening/mobility - Keep Call bell within reach- Keep bed low and locked with side rails adjusted as appropriate- Keep care items and personal belongings within reach- Initiate and maintain comfort rounds- Make Fall Risk Sign visible to staff- Offer Toileting every 2 Hours, in advance of need- Initiate/Maintain bed alarm- Obtain necessary fall risk management equipment: - Apply yellow socks and bracelet for high fall risk patients- Consider moving patient to room near nurses station  Outcome: Progressing  Goal: Maintain or return to baseline ADL function  Description: INTERVENTIONS:-  Assess patient's ability to carry out ADLs; assess patient's baseline for ADL function and identify physical deficits which impact ability to perform ADLs (bathing, care of mouth/teeth, toileting, grooming, dressing, etc.)- Assess/evaluate cause of self-care deficits - Assess range of motion- Assess patient's mobility; develop plan if impaired- Assess patient's need for assistive devices and provide as appropriate- Encourage maximum independence but intervene and supervise when necessary- Involve family in performance of ADLs- Assess for home care needs following discharge - Consider OT consult to assist with ADL evaluation and planning for discharge- Provide patient education as appropriate  Outcome: Progressing  Goal: Maintains/Returns to pre admission functional level  Description: INTERVENTIONS:- Perform AM-PAC 6 Click Basic Mobility/ Daily Activity assessment daily.- Set and communicate daily mobility goal to care team and patient/family/caregiver. -  Collaborate with rehabilitation services on mobility goals if consulted Reposition patient every 2 hours.- Dangle patient 3 times a day- Stand patient 3 times a day- Ambulate patient 3 times a day- Out of bed to chair 3 times a day - Out of bed for meals 3 times a day- Out of bed for toileting- Record patient progress and toleration of activity level   Outcome: Progressing     Problem: DISCHARGE PLANNING  Goal: Discharge to home or other facility with appropriate resources  Description: INTERVENTIONS:- Identify barriers to discharge w/patient and caregiver- Arrange for needed discharge resources and transportation as appropriate- Identify discharge learning needs (meds, wound care, etc.)- Arrange for interpretive services to assist at discharge as needed- Refer to Case Management Department for coordinating discharge planning if the patient needs post-hospital services based on physician/advanced practitioner order or complex needs related to functional status, cognitive ability, or social support system  Outcome: Progressing     Problem: Knowledge Deficit  Goal: Patient/family/caregiver demonstrates understanding of disease process, treatment plan, medications, and discharge instructions  Description: Complete learning assessment and assess knowledge base.Interventions:- Provide teaching at level of understanding- Provide teaching via preferred learning methods  Outcome: Progressing     Problem: RESPIRATORY - ADULT  Goal: Achieves optimal ventilation and oxygenation  Description: INTERVENTIONS:- Assess for changes in respiratory status- Assess for changes in mentation and behavior- Position to facilitate oxygenation and minimize respiratory effort- Oxygen administered by appropriate delivery if ordered- Initiate smoking cessation education as indicated- Encourage broncho-pulmonary hygiene including cough, deep breathe, Incentive Spirometry- Assess the need for suctioning and aspirate as needed- Assess and instruct  to report SOB or any respiratory difficulty- Respiratory Therapy support as indicated  Outcome: Progressing

## 2025-04-05 NOTE — PHYSICAL THERAPY NOTE
Physical Therapy Evaluation   Patient Name: Fang Victor    Today's Date: 4/5/2025     Problem List  Active Problems:    Acute respiratory failure with hypoxia (HCC)    Pulmonary fibrosis (HCC)    Pulmonary edema    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    Stage 3b chronic kidney disease (HCC)       Past Medical History  Past Medical History:   Diagnosis Date    Chronic pulmonary embolism (HCC)     COPD (chronic obstructive pulmonary disease) (HCC)     Edema     Hyperlipidemia     Hypertension         Past Surgical History  History reviewed. No pertinent surgical history.     04/05/25 1005   PT Last Visit   PT Visit Date 04/05/25   Note Type   Note type Evaluation   Restrictions/Precautions   Weight Bearing Precautions Per Order No   Other Precautions O2;Bed Alarm;Chair Alarm;Fall Risk   Home Living   Type of Home Assisted living   Home Layout One level;Performs ADLs on one level;Access   Bathroom Shower/Tub   (Shower room at facility)   Bathroom Toilet Standard   Bathroom Equipment Commode;Grab bars around toilet   Bathroom Accessibility Accessible via wheelchair;Accessible   Home Equipment Walker;Wheelchair-manual   Additional Comments Pt is MI with W/C. Reports she does ambulate approx 150 feet with PT and a chair follow   Prior Function   Level of [place] Modified independent with wheelchair   Lives With Facility staff   Receives Help From Other (Comment)   IADLs Family/Friend/Other provides meals;Family/Friend/Other provides medication management;Family/Friend/Other provides transportation   Falls in the last 6 months 0   Vocational Retired   Cognition   Overall Cognitive Status WFL   Arousal/Participation Alert   Orientation Level Oriented X4   Memory Within functional limits   Following Commands Follows all commands and directions without difficulty   Subjective   Subjective Pt is pleasant and agreeable to PT   RLE Assessment   RLE  Assessment WFL   LLE Assessment   LLE Assessment WFL   Bed Mobility   Additional Comments Pt seated OOB working with OT at the start of the session   Transfers   Sit to Stand 3  Moderate assistance   Additional items Assist x 1;Increased time required;Verbal cues;Armrests   Stand to Sit 3  Moderate assistance   Additional items Assist x 1;Increased time required;Verbal cues;Armrests   Stand pivot 3  Moderate assistance   Additional items Assist x 1;Increased time required;Verbal cues;Armrests   Additional Comments Cues for safety and sequecing, Poor safety awareness at times. RW   Ambulation/Elevation   Gait pattern Decreased hip extension;Decreased heel strike;Decreased toe off;Foward flexed;Inconsistent andrzej;Shuffling   Gait Assistance 3  Moderate assist   Additional items Assist x 1   Assistive Device Rolling walker   Distance 15-20 feet with chair follow   Ambulation/Elevation Additional Comments Pt on room air t/o the session. SpO2 drops to 86 with exertion. Above 90  with in 1 min recovery period. Pt was placed on room air as requested by nursing at the end of the session nursing working with patient at the end of the session.    Balance   Static Sitting Fair   Dynamic Sitting Fair   Static Standing Poor +  (RW)   Dynamic Standing Poor +  (RW)   Ambulatory Poor +  (RW)   Activity Tolerance   Activity Tolerance Patient tolerated treatment well;Patient limited by fatigue   Assessment   Prognosis Fair   Problem List Decreased strength;Decreased range of motion;Impaired balance;Decreased endurance;Decreased mobility;Decreased safety awareness   Assessment Pt is 89 y.o. female seen for PT evaluation s/p admit to [facility] on 4/4/2025 w/ <principal problem not specified>. PT consulted to assess pt's functional mobility and d/c needs. Order placed for PT eval and tx, w/ up and OOB as tolerated order. Comorbidities affecting pt's physical performance at time of assessment include, respiratory failure with hypoxia,  pulmonary fibrosis, pulmonary edema, hx of DVT, CKD, ILD, lymphedema, . PTA, pt was requiring A for mobility. Personal factors affecting pt at time of IE include: inaccessible home environment, ambulating w/ assistive device, inability to ambulate household distances, inability to navigate community distances, inability to navigate level surfaces w/o external assistance, unable to perform dynamic tasks in community, unable to perform physical activity, limited insight into impairments, inability to perform IADLs, inability to perform ADLs, and inability to live alone. Please find objective findings from PT assessment regarding body systems outlined above with impairments and limitations including weakness, decreased ROM, impaired balance, decreased endurance, gait deviations, decreased activity tolerance, decreased functional mobility tolerance, decreased safety awareness, fall risk, and SOB upon exertion. Pt's clinical presentation is currently unstable/unpredictable seen in pt's presentation t/o the session. Pt to benefit from continued PT tx to address deficits as defined above and maximize level of functional independent mobility and consistency. From PT/mobility standpoint, recommendation at time of d/c would be Level 3 minimal resource intensity ( return to facility) pending progress in order to facilitate return to PLOF.   Goals   Patient Goals to feel better   [facility] Expiration Date 04/19/25   Short Term Goal #1 Pt will complete all  transfers and bed mobility with MI to help promote safe return to PLOF   Short Term Goal #2 Pt will safely ambulate 50 feet with (S) to help promote safe return to DEENA   Plan   Treatment/Interventions ADL retraining;Functional transfer training;Endurance training;Elevations;LE strengthening/ROM;Therapeutic exercise;Bed mobility;Gait training   PT Frequency 3-5x/wk   Discharge Recommendation   Rehab Resource Intensity Level, PT III (Minimum Resource Intensity)  (return to detention)    AM-PAC Basic Mobility Inpatient   Turning in Flat Bed Without Bedrails 3   Lying on Back to Sitting on Edge of Flat Bed Without Bedrails 3   Moving Bed to Chair 3   Standing Up From Chair Using Arms 2   Walk in Room 3   Climb 3-5 Stairs With Railing 2   Basic Mobility Inpatient Raw Score 16   Basic Mobility Standardized Score 38.32   [facility] Highest Level Of Mobility   -HL Goal 5: Stand one or more mins   JH-HLM Achieved 6: Walk 10 steps or more       Patient seated in chair at the end of the session, chair alarm on and turned on. The patient's AM-PAC Basic Mobility Inpatient Short Form Raw Score is 16 . A Raw score of less than or equal to 16 suggests the patient may benefit from discharge to post-acute rehabilitation services. Please also refer to the recommendation of the Physical Therapist for safe discharge planning.    Co-Eval with OT d/t complex past medical history and extensive co morbilities, placing the patient at an increased risk for falls.     Pt seen [phone]

## 2025-04-05 NOTE — PLAN OF CARE
Problem: OCCUPATIONAL THERAPY ADULT  Goal: Performs self-care activities at highest level of function for planned discharge setting.  See evaluation for individualized goals.  Description: Treatment Interventions: ADL retraining, Visual perceptual retraining, Functional transfer training, UE strengthening/ROM, Endurance training, Patient/family training, Equipment evaluation/education, Compensatory technique education, Energy conservation, Activityengagement          See flowsheet documentation for full assessment, interventions and recommendations.   Note: Limitation: Decreased ADL status, Decreased UE ROM, Decreased UE strength, Decreased Safe judgement during ADL, Decreased endurance, Decreased self-care trans, Decreased high-level ADLs  Prognosis: Good  Assessment: Pt is a 89 y.o. female, admitted to [facility] 4/4/2025 d/t experiencing desaturation on room air to 88%. Dx: acute respiratory failure with hypoxia. Pt with PMHx impacting their performance during ADL tasks, including: lymphedema, chronic pulmonary embolism, obesity. Prior to admission to the hospital Pt was performing ADLs without physical assistance. IADLs with physical assistance. Functional transfers/ambulation with physical assistance. Cognitive status was PTA was WFL. OT order placed to assess Pt's ADLs, cognitive status, and performance during functional tasks in order to maximize safety and independence while making most appropriate d/c recommendations. PT/OT co-evaluation completed at this time d/t significant mobility deficits and safety concerns. Pt's clinical presentation is currently unstable/unpredictable given new onset deficits that effect Pt's occupational performance and ability to safely return to PLOF including decrease activity tolerance, decrease standing balance, decrease sitting balance, decrease performance during ADL tasks, decrease safety awareness , decrease BUE ROM, decrease generalized strength, decrease activity engagement,  decrease performance during functional transfers, and limited insight to deficits combined with medical complications of abnormal CBC, new onset O2 use, edema/swelling, incontinence, and need for input for mobility technique/safety. Personal factors affecting Pt at time of initial evaluation include: advanced age, inability to perform ADLs, inability to ambulate household distances, inability to navigate community distances, limited insight into impairments, and decreased initiation and engagement. Pt will benefit from continued skilled OT services to address deficits as defined above and to maximize level independence/participation during ADLs and functional tasks to facilitate return toward PLOF and improved quality of life. From an occupational therapy standpoint, recommendation at time of d/c would be Level III: Minimum Resource Intensity.     Rehab Resource Intensity Level, OT: III (Minimum Resource Intensity)

## 2025-04-05 NOTE — PLAN OF CARE
Problem: PHYSICAL THERAPY ADULT  Goal: Performs mobility at highest level of function for planned discharge setting.  See evaluation for individualized goals.  Description: Treatment/Interventions: ADL retraining, Functional transfer training, Endurance training, Elevations, LE strengthening/ROM, Therapeutic exercise, Bed mobility, Gait training          See flowsheet documentation for full assessment, interventions and recommendations.  Note: Prognosis: Fair  Problem List: Decreased strength, Decreased range of motion, Impaired balance, Decreased endurance, Decreased mobility, Decreased safety awareness  Assessment: Pt is 89 y.o. female seen for PT evaluation s/p admit to [facility] on 4/4/2025 w/ <principal problem not specified>. PT consulted to assess pt's functional mobility and d/c needs. Order placed for PT eval and tx, w/ up and OOB as tolerated order. Comorbidities affecting pt's physical performance at time of assessment include, respiratory failure with hypoxia, pulmonary fibrosis, pulmonary edema, hx of DVT, CKD, ILD, lymphedema, . PTA, pt was requiring A for mobility. Personal factors affecting pt at time of IE include: inaccessible home environment, ambulating w/ assistive device, inability to ambulate household distances, inability to navigate community distances, inability to navigate level surfaces w/o external assistance, unable to perform dynamic tasks in community, unable to perform physical activity, limited insight into impairments, inability to perform IADLs, inability to perform ADLs, and inability to live alone. Please find objective findings from PT assessment regarding body systems outlined above with impairments and limitations including weakness, decreased ROM, impaired balance, decreased endurance, gait deviations, decreased activity tolerance, decreased functional mobility tolerance, decreased safety awareness, fall risk, and SOB upon exertion. Pt's clinical presentation is currently  unstable/unpredictable seen in pt's presentation t/o the session. Pt to benefit from continued PT tx to address deficits as defined above and maximize level of functional independent mobility and consistency. From PT/mobility standpoint, recommendation at time of d/c would be Level 3 minimal resource intensity ( return to facility) pending progress in order to facilitate return to PLOF.        Rehab Resource Intensity Level, PT: III (Minimum Resource Intensity) (return to [facility])    See flowsheet documentation for full assessment.

## 2025-04-05 NOTE — UTILIZATION REVIEW
Initial Clinical Review    Admission: Date/Time/Statement:   Admission Orders (From admission, onward)       Ordered              04/04/25 1151  Place in Observation  Once                          Orders Placed This Encounter   Procedures    Place in Observation     Standing Status:   Standing     Number of Occurrences:   1     Level of Care:   Med Surg [16]         ED Arrival Information       Expected   -    Arrival   4/4/2025 08:52    Acuity   Emergent              Means of arrival   Ambulance    Escorted by   [facility]    Service   Hospitalist    Admission type   Emergency              Arrival complaint   sob             Chief Complaint   Patient presents with    Shortness of Breath     Patient brought in by EMS from [place] for sob, back pain and cough. Patient has hx of frequent pneumonia.        Initial Presentation: 89 y.o. female with a PMH of pulmonary fibrosis, hypertension, hyperlipidemia, and obesity, who presents from SNF with with reports of dyspnea and apparent hypoxia. Patient found with saturations in the 80s on room air and experiencing cough per SNF record.  In ED, patient  with desaturation to 88% on room air-2 L nasal cannula initiated with adequate saturation.  Patient is not on oxygen at baseline. ED CXR suggestive of mild pulmonary edema and pulmonary fibrosis.  In ED, received OV Solu-Medrol, IV Levaquin and neb treatment.  Exam:  AOx3. Wheezing and rales present.        4/4 Admit to Observation for evaluation and treatment of acute respiratory failure with hypoxia, pulmonary fibrosis, pulmonary edema.  Wean O2, 2D ECHO.  Anticipated Length of Stay: Patient will be admitted on an observation basis with an anticipated length of stay of less than 2 midnights secondary to transient hypoxia.           ED Treatment-Medication Administration from 04/04/2025 0852 to 04/04/2025 1226         Date/Time Order Dose Route Action     04/04/2025 0940 methylPREDNISolone sodium succinate  (Solu-MEDROL) injection 80 mg 80 mg Intravenous Given     04/04/2025 0941 ipratropium-albuterol (DUO-NEB) 0.5-2.5 mg/3 mL inhalation solution 3 mL 3 mL Nebulization Given     04/04/2025 1013 levofloxacin (LEVAQUIN) IVPB (premix in dextrose) 750 mg 150 mL 750 mg Intravenous New Bag            Scheduled Medications:      apixaban, 2.5 mg, Oral, BID  dorzolamide-timolol, 1 drop, Left Eye, BID  oxybutynin, 10 mg, Oral, HS  pravastatin, 40 mg, Oral, Daily      Continuous IV Infusions: None.      PRN Meds:  albuterol, 2.5 mg, Nebulization, Q6H PRN  benzonatate, 100 mg, Oral, TID PRN  dextromethorphan-guaiFENesin, 10 mL, Oral, Q4H PRN      ED Triage Vitals   Temperature Pulse Respirations Blood Pressure SpO2 Pain Score   04/04/25 0854 04/04/25 0854 04/04/25 0854 04/04/25 0854 04/04/25 0854 04/04/25 1233   97.5 °F (36.4 °C) 80 22 (!) 184/111 (!) 88 % No Pain     Weight (last 2 days)       Date/Time Weight         04/04/25 12:33:46 103 (227)    04/04/25 0854 103 (227.07)            Vital Signs (last 3 days)      Date/Time Temp Pulse Resp BP MAP (mmHg) SpO2 Calculated FIO2 (%) - Nasal Cannula Nasal Cannula O2 Flow Rate (L/min) O2 Device Patient Position - Orthostatic VS [place] Coma Scale Score Pain   04/04/25 1130 -- 79 20 141/103 116 94 % 28 2 L/min Nasal cannula -- -- --   04/04/25 1100 -- 76 18 140/81 105 94 % 28 2 L/min Nasal cannula -- -- --   04/04/25 1045 -- 83 -- -- -- 98 % 28 2 L/min Nasal cannula -- -- --   04/04/25 1030 -- 68 -- 120/59 85 98 % -- -- -- -- -- --   04/04/25 1015 -- 73 -- 135/67 95 96 % -- -- -- -- -- --   04/04/25 1000 -- 55 18 149/67 96 98 % 28 2 L/min Nasal cannula -- -- --   04/04/25 0945 -- 78 -- 143/65 94 98 % -- -- -- -- -- --   04/04/25 0930 -- 75 -- -- -- 96 % -- -- -- -- -- --   04/04/25 0915 -- 72 -- -- -- 96 % -- -- -- -- -- --   04/04/25 0907 -- -- -- -- -- -- -- -- Nasal cannula -- -- --   04/04/25 0900 -- 76 18 164/70 101 90 % 28 2 L/min Nasal cannula -- 15 --   04/04/25 0857 -- -- --  164/70 -- 89 % -- -- None (Room air) -- -- --   04/04/25 0854 97.5 °F (36.4 °C) 80 22 184/111 -- 88 % -- -- None (Room air) -- -- --              Pertinent Labs/Diagnostic Test Results:       Radiology:  CT chest wo contrast   Final Interpretation by Darren Arellano MD (04/04 1726)      Stable pulmonary fibrosis and reticular changes/scarring. No acute consolidation.               Workstation performed: [id number]         XR chest 1 view portable   Final Interpretation by Geovani Law MD (04/04 1053)      Reticular opacities correlating to pulmonary fibrosis.      Mild pulmonary vascular congestion.            Workstation performed: [id number]           Cardiology:      Echo complete w/ contrast if indicated   Final Result by Harley Zuniga DO (04/04 1337)        Left Ventricle: Left ventricular cavity size is normal. Wall thickness    is normal. The left ventricular ejection fraction is 75% by visual    estimation. Systolic function is hyperdynamic. Wall motion is normal.    Diastolic function is abnormal.  Left atrial filling pressure is elevated.     Left Atrium: The atrium is mildly dilated.     Aortic Valve: The leaflets are moderately calcified. There is mild to    moderate regurgitation. There is mild stenosis. The aortic valve mean    gradient is 14 mmHg. The dimensionless velocity index is 0.40. The aortic    valve area is 0.80 cm2. Aortic valve peak velocity is 2.44 m/s. AV mean    gradient is 14 mmHg.     Mitral Valve: There is moderate annular calcification. There is mild    regurgitation. There is no evidence of stenosis. MV mean gradient    antegrade is 4 mmHg.     Tricuspid Valve: There is mild regurgitation. The estimated right    ventricular systolic pressure is 50.00 mmHg.     IVC/SVC: The right atrial pressure is estimated at 3.0 mmHg. The    inferior vena cava is normal in size. Respirophasic changes were normal.         ECG 12 lead   Final Result by Harley Zuniga DO (04/04  1112)   Normal sinus rhythm   Right bundle branch block   Abnormal ECG   When compared with ECG of 31-Aug-2022 20:35,   No significant change was found   Confirmed by Harley Zuniga ([id number]) on 4/4/2025 11:12:42 AM                Results from last 7 days   Lab Units 04/04/25  0938   WBC Thousand/uL 8.86   HEMOGLOBIN g/dL 13.8   HEMATOCRIT % 41.8   PLATELETS Thousands/uL 182   TOTAL NEUT ABS Thousands/µL 6.21         Results from last 7 days   Lab Units 04/04/25  1045   SODIUM mmol/L 139   POTASSIUM mmol/L 3.6   CHLORIDE mmol/L 101   CO2 mmol/L 27   ANION GAP mmol/L 11   BUN mg/dL 31*   CREATININE mg/dL 1.39*   EGFR ml/min/1.73sq m 34   CALCIUM mg/dL 8.8   MAGNESIUM mg/dL  --    PHOSPHORUS mg/dL  --      Results from last 7 days   Lab Units 04/04/25  1045   AST U/L 17   ALT U/L 8   ALK PHOS U/L 68   TOTAL PROTEIN g/dL 7.1   ALBUMIN g/dL 3.7   TOTAL BILIRUBIN mg/dL 0.79         Results from last 7 days   Lab Units 04/04/25  1045   GLUCOSE RANDOM mg/dL 120           Results from last 7 days   Lab Units 04/04/25  0938   PH ELIAZAR  7.411*   PCO2 ELIAZAR mm Hg 42.5   PO2 ELIAZAR mm Hg 53.4*   HCO3 ELIAZAR mmol/L 26.4   BASE EXC ELIAZAR mmol/L 1.5   O2 CONTENT ELIAZAR ml/dL 17.8   O2 HGB, VENOUS % 85.3*             Results from last 7 days   Lab Units 04/04/25  1415 04/04/25  1045 04/04/25  0938   HS TNI 0HR ng/L  --   --  37   HS TNI 2HR ng/L  --  43  --    HSTNI D2 ng/L  --  6  --    HS TNI 4HR ng/L 46  --   --    HSTNI D4 ng/L 9  --   --                  Results from last 7 days   Lab Units 04/04/25  1045   PROCALCITONIN ng/ml 0.07     Results from last 7 days   Lab Units 04/04/25  0938   LACTIC ACID mmol/L 1.3             Results from last 7 days   Lab Units 04/04/25  0938   BNP pg/mL 249*                   Results from last 7 days   Lab Units 04/04/25  0938 04/04/25  0937   BLOOD CULTURE  Received in Microbiology Lab. Culture in Progress. Received in Microbiology Lab. Culture in Progress.                   Past Medical History:   Diagnosis  Date    Chronic pulmonary embolism (HCC)     COPD (chronic obstructive pulmonary disease) (HCC)     Edema     Hyperlipidemia     Hypertension      Present on Admission:   Acute respiratory failure with hypoxia (HCC)   Pulmonary fibrosis (HCC)   Pulmonary edema   Stage 3b chronic kidney disease (HCC)      Admitting Diagnosis: SOB (shortness of breath) [R06.02]  Hypoxia [R09.02]  COPD exacerbation (HCC) [J44.1]  Age/Sex: 89 y.o. female    Network Utilization Review Department  ATTENTION: Please call with any questions or concerns to [phone] and carefully listen to the prompts so that you are directed to the right person. All voicemails are confidential.   For Discharge needs, contact Care Management DC Support Team at [phone] opt. [phone]  Send all requests for admission clinical reviews, approved or denied determinations and any other requests to dedicated fax number below belonging to the campus where the patient is receiving treatment. List of dedicated fax numbers for the Facilities:  FACILITY NAME UR FAX NUMBER   ADMISSION DENIALS (Administrative/Medical Necessity) [phone]   DISCHARGE SUPPORT TEAM (NETWORK) [phone]   PARENT CHILD HEALTH (Maternity/NICU/Pediatrics) [phone]   [facility] [phone]   [facility] [phone]   [facility] [phone]   [facility] [phone]   [facility] [phone]   [facility] [phone]   [facility] [phone]   [facility] [phone]   [facility] [phone]   Critical access hospital [phone]   [facility] [phone]   [facility] [phone]

## 2025-04-05 NOTE — ASSESSMENT & PLAN NOTE
"Patient presented from SNF  (4/4) with reports of shortness of breath and cough  Patient denies any symptoms and states that she feels \"like she always does\"  Apparently, per SNF report, transient desaturations prompting referral to emergency department    In ED, without SIRS criteria, hemodynamically stable, with apparent desaturation to 88% requiring initiation of 2 L nasal cannula  Subsequent evaluation included plain film chest x-ray which was suggestive of mild pulmonary edema  Patient carries history of pulmonary fibrosis but is not oxygen dependent at baseline  COVID, flu, RSV negative  Received antibiotics in the ED      Index suspicion for transient hypoxia as the result of mild pulmonary edema versus persistent/progressive pulmonary fibrosis  COVID, flu, RSV negative  Low index of suspicion for active infection/pneumonia given absence of SIRS criteria and lack of discrete infiltrates on plain film  CT scan (4/4) as follows:  Stable pulmonary fibrosis and reticular changes/scarring. No acute consolidation.   See plans below  Wean oxygen to off    Today (4/5) remains on 1 L nasal cannula-saturations in the low 90s-hopeful to wean to room air over the next 24 hours  May require ambulatory pulse oximetry prior to discharge which may be as soon as tomorrow (4/6)  "

## 2025-04-05 NOTE — PROGRESS NOTES
"Progress Note - Hospitalist   Name: Fang Victor 89 y.o. female I MRN: [id number]  Unit/Bed#: -01 I Date of Admission: 4/4/2025   Date of Service: 4/5/2025 I Hospital Day: 0    Assessment & Plan  Acute respiratory failure with hypoxia (HCC)  Patient presented from SNF  (4/4) with reports of shortness of breath and cough  Patient denies any symptoms and states that she feels \"like she always does\"  Apparently, per SNF report, transient desaturations prompting referral to emergency department    In ED, without SIRS criteria, hemodynamically stable, with apparent desaturation to 88% requiring initiation of 2 L nasal cannula  Subsequent evaluation included plain film chest x-ray which was suggestive of mild pulmonary edema  Patient carries history of pulmonary fibrosis but is not oxygen dependent at baseline  COVID, flu, RSV negative  Received antibiotics in the ED      Index suspicion for transient hypoxia as the result of mild pulmonary edema versus persistent/progressive pulmonary fibrosis  COVID, flu, RSV negative  Low index of suspicion for active infection/pneumonia given absence of SIRS criteria and lack of discrete infiltrates on plain film  CT scan (4/4) as follows:  Stable pulmonary fibrosis and reticular changes/scarring. No acute consolidation.   See plans below  Wean oxygen to off    Today (4/5) remains on 1 L nasal cannula-saturations in the low 90s-hopeful to wean to room air over the next 24 hours  May require ambulatory pulse oximetry prior to discharge which may be as soon as tomorrow (4/6)  Pulmonary fibrosis (HCC)  Patient with longstanding history of pulmonary fibrosis  Not oxygen but at baseline  Plain film imaging suggestive of pulmonary fibrosis-doubt infectious infiltrates  It is possible the patient has evolved some degree of chronic hypoxia given advanced underlying findings  Will treat for component of mild pulmonary edema and monitor for improvement    Pulmonary edema  Patient with " evidence of mild pulmonary edema on plain film imaging  Have ordered CT chest to better examine lung parenchyma given presentation with transient hypoxia  Will assess daily weight/volume status and determine appropriateness of continuing with diuretic therapy intravenously  Today (4/5) proceed with intravenous diuretic therapy-Lasix IV 40 mg IV x 1  Reports compliance with oral Lasix in the outpatient setting  2D echocardiogram (4/4) preserved ejection fraction without evidence of overt diastolic dysfunction  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 Sequential Compression Devices Ordered.    Mobility:   Basic Mobility Inpatient Raw Score: 16  JH-HLM Goal: 5: Stand one or more mins  JH-HLM Achieved: 6: Walk 10 steps or more  JH-HLM Goal achieved. Continue to encourage appropriate mobility.    Patient Centered Rounds: I performed bedside rounds with nursing staff today.   Discussions with Specialists or Other Care Team Provider:     Education and Discussions with Family / Patient: Patient declined call to .     Current Length of Stay: 0 day(s)  Current Patient Status: Inpatient   Certification Statement: The patient will continue to require additional inpatient hospital stay due to acute hypoxemic respiratory failure secondary to pulmonary edema and/or persistent/progressive underlying pulmonary fibrosis  Discharge Plan: Anticipate discharge in  24-48 hrs to rehab facility.    Code Status: Level 3 - DNAR and DNI    Subjective   Examined at bedside.  Reports no complaints.  No events reported per nursing overnight.  Attempted to liberate from supplemental oxygen but with desaturation to the mid 80s.    Objective :  Temp:  [98 °F (36.7 °C)-98.1 °F (36.7 °C)] 98.1 °F (36.7 °C)  HR:  [67-92] 87  BP: (121-142)/(61-92) 130/61  Resp:  [18-20] 18  SpO2:  [84 %-96 %] 84 %  O2 Device: None (Room air)  Nasal Cannula O2 Flow Rate (L/min):  [1 L/min-2 L/min] 1 L/min    Body mass index is 45.73 kg/m².     Input and Output Summary (last 24 hours):     Intake/Output Summary (Last 24 hours) at 4/5/2025 1211  Last data filed at 4/4/2025 1901  Gross per 24 hour   Intake 240 ml   Output --   Net 240 ml       Physical Exam  Constitutional:       General: She is not in acute distress.     Appearance: She is obese. She is not ill-appearing, toxic-appearing or diaphoretic.   HENT:      Head: Normocephalic and atraumatic.      Right Ear: External ear normal. There is no impacted cerumen.      Left Ear: External ear normal.      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 No congestion.      Mouth/Throat:      Pharynx: Oropharynx is clear.   Eyes:      General: No scleral icterus.     Conjunctiva/sclera: Conjunctivae normal.   Pulmonary:      Breath sounds: No stridor. Wheezing and rales present. No rhonchi.   Chest:      Chest wall: No tenderness.   Abdominal:      Tenderness: There is no right CVA tenderness, left CVA tenderness or rebound.   Musculoskeletal:      Right lower leg: No edema.      Left lower leg: No edema.   Skin:     Capillary Refill: Capillary refill takes less than 2 seconds.      Findings: No lesion.   Neurological:      General: No focal deficit present.      Mental Status: She is alert and oriented to person, place, and time. Mental status is at baseline.   Psychiatric:         Mood and Affect: Mood normal.         Behavior: Behavior  normal.         Thought Content: Thought content normal.         Judgment: Judgment normal.       Lines/Drains:              Lab Results: I have reviewed the following results:   Results from last 7 days   Lab Units 04/05/25  0548 04/04/25  0938   WBC Thousand/uL 7.58 8.86   HEMOGLOBIN g/dL 12.3 13.8   HEMATOCRIT % 37.6 41.8   PLATELETS Thousands/uL 175 182   SEGS PCT %  --  69   LYMPHO PCT %  --  15   MONO PCT %  --  14*   EOS PCT %  --  1     Results from last 7 days   Lab Units 04/05/25  0548   SODIUM mmol/L 137   POTASSIUM mmol/L 3.7   CHLORIDE mmol/L 103   CO2 mmol/L 25   BUN mg/dL 33*   CREATININE mg/dL 1.41*   ANION GAP mmol/L 9   CALCIUM mg/dL 8.2*   ALBUMIN g/dL 3.3*   TOTAL BILIRUBIN mg/dL 0.48   ALK PHOS U/L 60   ALT U/L 7   AST U/L 18   GLUCOSE RANDOM mg/dL 114                 Results from last 7 days   Lab Units 04/04/25  1045 04/04/25  0938   LACTIC ACID mmol/L  --  1.3   PROCALCITONIN ng/ml 0.07  --        Recent Cultures (last 7 days):   Results from last 7 days   Lab Units 04/04/25  0938 04/04/25  0937   BLOOD CULTURE  Received in Microbiology Lab. Culture in Progress. Received in Microbiology Lab. Culture in Progress.       Imaging Results Review: I reviewed radiology reports from this admission including: chest xray.  Other Study Results Review: No additional pertinent studies reviewed.    Last 24 Hours Medication List:     Current Facility-Administered Medications:     albuterol inhalation solution 2.5 mg, Q6H PRN    apixaban (ELIQUIS) tablet 2.5 mg, BID    benzonatate (TESSALON PERLES) capsule 100 mg, TID PRN    dextromethorphan-guaiFENesin (ROBITUSSIN DM) oral syrup 10 mL, Q4H PRN    dorzolamide-timolol (COSOPT) 2-0.5 % ophthalmic solution 1 drop, BID    oxybutynin (DITROPAN-XL) 24 hr tablet 10 mg, HS    pravastatin (PRAVACHOL) tablet 40 mg, Daily    Administrative Statements   Today, Patient Was Seen By: Corwin Gaitan, DO  I have spent a total time of 37 minutes in caring for this  patient on the day of the visit/encounter including Instructions for management, Importance of tx compliance, Impressions, Counseling / Coordination of care, and Documenting in the medical record.    **Please Note: This note may have been constructed using a voice recognition system.**

## 2025-04-05 NOTE — UTILIZATION REVIEW
Initial Clinical Review - admitted as OBS 4/4/25, converted to Inpatient 4/5/25 due to acute hypoxemic respiratory failure secondary to pulmonary edema and/or persistent/progressive underlying pulmonary fibrosis.    Admission: Date/Time/Statement:   Admission Orders (From admission, onward)       Ordered        04/05/25 1208  INPATIENT ADMISSION  Once            04/04/25 1151  Place in Observation  Once                          Orders Placed This Encounter   Procedures    INPATIENT ADMISSION     Standing Status:   Standing     Number of Occurrences:   1     Level of Care:   Med Surg [16]     Estimated length of stay:   More than 2 Midnights     Certification:   I certify that inpatient services are medically necessary for this patient for a duration of greater than two midnights. See H&P and MD Progress Notes for additional information about the patient's course of treatment.     ED Arrival Information       Expected   -    Arrival   4/4/2025 08:52    Acuity   Emergent              Means of arrival   Ambulance    Escorted by   [facility]    Service   Hospitalist    Admission type   Emergency              Arrival complaint   sob             Chief Complaint   Patient presents with    Shortness of Breath     Patient brought in by EMS from [facility] for sob, back pain and cough. Patient has hx of frequent pneumonia.        Initial Presentation: 89 y.o. female with a PMH of pulmonary fibrosis, hypertension, hyperlipidemia, and obesity, who presents from SNF with with reports of dyspnea and apparent hypoxia. Patient found with saturations in the 80s on room air and experiencing cough per SNF record.  In ED, patient  with desaturation to 88% on room air-2 L nasal cannula initiated with adequate saturation.  Patient is not on oxygen at baseline. ED CXR suggestive of mild pulmonary edema and pulmonary fibrosis.  In ED, received OV Solu-Medrol, IV Levaquin and neb treatment.  Exam:  AOx3. Wheezing and rales present.         4/4 Admit to Observation for evaluation and treatment of acute respiratory failure with hypoxia, pulmonary fibrosis, pulmonary edema.  Wean O2, 2D ECHO.  Anticipated Length of Stay: Patient will be admitted on an observation basis with an anticipated length of stay of less than 2 midnights secondary to transient hypoxia.    4/5 Internal Medicine:  Attempted to liberate from supplemental oxygen but with desaturation to the mid 80s.  Remains on 1 L nasal cannula-saturations in the low 90s-hopeful to wean to room air over the next 24 hours.  Lasix IV 40 mg IV x 1 today. May require ambulatory pulse oximetry prior to discharge. Certification Statement: The patient will continue to require additional inpatient hospital stay due to acute hypoxemic respiratory failure secondary to pulmonary edema and/or persistent/progressive underlying pulmonary fibrosis.    4/6  Day 3: Has surpassed a 2nd midnight with active treatments and services. Internal Medicine: Patient reports improvement in breathing, states she is having back spasms. Wheezing and rales on exam.   Liberated from supplemental oxygen at rest.  Mild desaturation with activity to 86 to 87% this morning. Will proceed with additional dose of diuretics today-may require ambulatory pulse oximetry. Anticipate discharge back to assisted living facility tomorrow.         ED Treatment-Medication Administration from 04/04/2025 0852 to 04/04/2025 1226      Date/Time Order Dose Route Action   04/04/2025 0940 methylPREDNISolone sodium succinate (Solu-MEDROL) injection 80 mg 80 mg Intravenous Given   04/04/2025 0941 ipratropium-albuterol (DUO-NEB) 0.5-2.5 mg/3 mL inhalation solution 3 mL 3 mL Nebulization Given   04/04/2025 1013 levofloxacin (LEVAQUIN) IVPB (premix in dextrose) 750 mg 150 mL 750 mg Intravenous New Bag            Scheduled Medications:      apixaban, 2.5 mg, Oral, BID  dorzolamide-timolol, 1 drop, Left Eye, BID  oxybutynin, 10 mg, Oral, HS  pravastatin, 40  mg, Oral, Daily    furosemide (LASIX) injection 40 mg  Dose: 40 mg  Freq: Once Route: IV  Start: 04/06/25 1230 End: 04/06/25 1332    furosemide (LASIX) injection 40 mg  Dose: 40 mg  Freq: Once Route: IV  Start: 04/05/25 0915 End: 04/05/25 0957      Continuous IV Infusions: None.      PRN Meds:      albuterol, 2.5 mg, Nebulization, Q6H PRN  benzonatate, 100 mg, Oral, TID PRN  dextromethorphan-guaiFENesin, 10 mL, Oral, Q4H PRN    methocarbamol (ROBAXIN) tablet 500 mg x 1 dose 4/6 thus far  Dose: 500 mg  Freq: Every 6 hours PRN Route: PO  PRN Reason: muscle spasms  Start: 04/06/25 0909      ED Triage Vitals   Temperature Pulse Respirations Blood Pressure SpO2 Pain Score   04/04/25 0854 04/04/25 0854 04/04/25 0854 04/04/25 0854 04/04/25 0854 04/04/25 1233   97.5 °F (36.4 °C) 80 22 (!) 184/111 (!) 88 % No Pain     Weight (last 2 days)       Date/Time Weight    04/06/25 0534 100 (220.46)    04/05/25 09:29:28 99.2 (218.8)    04/04/25 12:33:46 103 (227)    04/04/25 0854 103 (227.07)            Vital Signs (last 3 days)      Date/Time Temp Pulse Resp BP MAP (mmHg) SpO2 Calculated FIO2 (%) - Nasal Cannula Nasal Cannula O2 Flow Rate (L/min) O2 Device [place] Coma Scale Score Pain   04/06/25 15:13:51 98.1 °F (36.7 °C) 65 18 127/59 82 88 % -- -- -- -- --   04/06/25 0800 -- -- -- -- -- -- -- -- -- 15 No Pain   04/06/25 07:42:42 98 °F (36.7 °C) 70 -- 119/67 84 90 % -- -- -- -- --   04/05/25 21:48:52 -- 68 -- 129/51 77 92 % -- -- -- -- --   04/05/25 1915 -- -- -- -- -- 91 % -- -- None (Room air) -- No Pain   04/05/25 16:25:42 98 °F (36.7 °C) 79 18 108/54 72 90 % -- -- -- -- --   04/05/25 09:46:53 -- 87 -- -- -- 84 % -- -- None (Room air) -- --   04/05/25 09:45:20 -- 92 -- -- -- 91 % -- -- None (Room air) -- --   04/05/25 09:29:28 -- 67 -- -- -- 90 % -- -- None (Room air) -- --   04/05/25 09:19:19 -- 83 -- -- -- 92 % -- -- None (Room air) -- --   04/05/25 09:09:31 -- 83 -- -- -- 94 % 24 1 L/min Nasal cannula -- --   04/05/25 0903 --  -- -- -- -- -- -- -- -- -- No Pain   04/05/25 07:29:31 98.1 °F (36.7 °C) 80 18 130/61 84 90 % -- -- -- -- --   04/05/25 0729 -- -- -- -- -- -- -- -- -- 15 No Pain   04/05/25 0600 -- -- -- -- -- 91 % -- -- None (Room air) -- --   04/05/25 0500 -- -- -- -- -- 93 % 24 1 L/min Nasal cannula -- --   04/04/25 1940 -- -- -- -- -- 96 % 28 2 L/min Nasal cannula -- No Pain   04/04/25 1655 -- -- -- -- -- -- -- -- -- 15 No Pain   04/04/25 16:05:40 -- 81 19 121/92 102 95 % -- -- -- -- --   04/04/25 12:33:46 98 °F (36.7 °C) 77 20 142/65 91 93 % 28 2 L/min Nasal cannula 15 No Pain   04/04/25 1130 -- 79 20 141/103 116 94 % 28 2 L/min Nasal cannula -- --   04/04/25 1100 -- 76 18 140/81 105 94 % 28 2 L/min Nasal cannula -- --   04/04/25 1045 -- 83 -- -- -- 98 % 28 2 L/min Nasal cannula -- --   04/04/25 1030 -- 68 -- 120/59 85 98 % -- -- -- -- --   04/04/25 1015 -- 73 -- 135/67 95 96 % -- -- -- -- --   04/04/25 1000 -- 55 18 149/67 96 98 % 28 2 L/min Nasal cannula -- --   04/04/25 0945 -- 78 -- 143/65 94 98 % -- -- -- -- --   04/04/25 0930 -- 75 -- -- -- 96 % -- -- -- -- --   04/04/25 0915 -- 72 -- -- -- 96 % -- -- -- -- --   04/04/25 0907 -- -- -- -- -- -- -- -- Nasal cannula -- --   04/04/25 0900 -- 76 18 164/70 101 90 % 28 2 L/min Nasal cannula 15 --   04/04/25 0857 -- -- -- 164/70 -- 89 % -- -- None (Room air) -- --   04/04/25 0854 97.5 °F (36.4 °C) 80 22 184/111 -- 88 % -- -- None (Room air) -- --              Pertinent Labs/Diagnostic Test Results:   Radiology:  CT chest wo contrast   Final Interpretation by Darren Arellano MD (04/04 1726)      Stable pulmonary fibrosis and reticular changes/scarring. No acute consolidation.               Workstation performed: [id number]         XR chest 1 view portable   Final Interpretation by Geovani Law MD (04/04 1053)      Reticular opacities correlating to pulmonary fibrosis.      Mild pulmonary vascular congestion.            Workstation performed: [id number]            Cardiology:      Echo complete w/ contrast if indicated   Final Result by Harley Zuniga DO (04/04 4077)        Left Ventricle: Left ventricular cavity size is normal. Wall thickness    is normal. The left ventricular ejection fraction is 75% by visual    estimation. Systolic function is hyperdynamic. Wall motion is normal.    Diastolic function is abnormal.  Left atrial filling pressure is elevated.     Left Atrium: The atrium is mildly dilated.     Aortic Valve: The leaflets are moderately calcified. There is mild to    moderate regurgitation. There is mild stenosis. The aortic valve mean    gradient is 14 mmHg. The dimensionless velocity index is 0.40. The aortic    valve area is 0.80 cm2. Aortic valve peak velocity is 2.44 m/s. AV mean    gradient is 14 mmHg.     Mitral Valve: There is moderate annular calcification. There is mild    regurgitation. There is no evidence of stenosis. MV mean gradient    antegrade is 4 mmHg.     Tricuspid Valve: There is mild regurgitation. The estimated right    ventricular systolic pressure is 50.00 mmHg.     IVC/SVC: The right atrial pressure is estimated at 3.0 mmHg. The    inferior vena cava is normal in size. Respirophasic changes were normal.         ECG 12 lead   Final Result by Harley Zuniga DO (04/04 1112)   Normal sinus rhythm   Right bundle branch block   Abnormal ECG   When compared with ECG of 31-Aug-2022 20:35,   No significant change was found   Confirmed by Harley Zuniga ([id number]) on 4/4/2025 11:12:42 AM                  Results from last 7 days   Lab Units 04/06/25  0532 04/05/25  0548 04/04/25  0938   WBC Thousand/uL 7.61 7.58 8.86   HEMOGLOBIN g/dL 13.2 12.3 13.8   HEMATOCRIT % 40.2 37.6 41.8   PLATELETS Thousands/uL 162 175 182   TOTAL NEUT ABS Thousands/µL  --   --  6.21         Results from last 7 days   Lab Units 04/06/25  0532 04/05/25  0548 04/04/25  1045   SODIUM mmol/L 137 137 139   POTASSIUM mmol/L 4.0 3.7 3.6   CHLORIDE mmol/L 101 103 101    CO2 mmol/L 29 25 27   ANION GAP mmol/L 7 9 11   BUN mg/dL 39* 33* 31*   CREATININE mg/dL 1.53* 1.41* 1.39*   EGFR ml/min/1.73sq m 30 33 34   CALCIUM mg/dL 8.2* 8.2* 8.8   MAGNESIUM mg/dL 2.1 2.1  --    PHOSPHORUS mg/dL  --  3.6  --      Results from last 7 days   Lab Units 04/05/25  0548 04/04/25  1045   AST U/L 18 17   ALT U/L 7 8   ALK PHOS U/L 60 68   TOTAL PROTEIN g/dL 6.3* 7.1   ALBUMIN g/dL 3.3* 3.7   TOTAL BILIRUBIN mg/dL 0.48 0.79         Results from last 7 days   Lab Units 04/06/25  0532 04/05/25  0548 04/04/25  1045   GLUCOSE RANDOM mg/dL 88 114 120           Results from last 7 days   Lab Units 04/04/25  0938   PH ELIAZAR  7.411*   PCO2 ELIAZAR mm Hg 42.5   PO2 ELIAZAR mm Hg 53.4*   HCO3 ELIAZAR mmol/L 26.4   BASE EXC ELIAZAR mmol/L 1.5   O2 CONTENT ELIAZAR ml/dL 17.8   O2 HGB, VENOUS % 85.3*             Results from last 7 days   Lab Units 04/04/25  1415 04/04/25  1045 04/04/25  0938   HS TNI 0HR ng/L  --   --  37   HS TNI 2HR ng/L  --  43  --    HSTNI D2 ng/L  --  6  --    HS TNI 4HR ng/L 46  --   --    HSTNI D4 ng/L 9  --   --                  Results from last 7 days   Lab Units 04/04/25  1045   PROCALCITONIN ng/ml 0.07     Results from last 7 days   Lab Units 04/04/25  0938   LACTIC ACID mmol/L 1.3             Results from last 7 days   Lab Units 04/04/25  0938   BNP pg/mL 249*               Results from last 7 days   Lab Units 04/04/25  0938 04/04/25  0937   BLOOD CULTURE  No Growth at 24 hrs. No Growth at 24 hrs.                   Past Medical History:   Diagnosis Date    Chronic pulmonary embolism (HCC)     COPD (chronic obstructive pulmonary disease) (HCC)     Edema     Hyperlipidemia     Hypertension      Present on Admission:   Acute respiratory failure with hypoxia (HCC)   Pulmonary fibrosis (HCC)   Pulmonary edema   Stage 3b chronic kidney disease (HCC)      Admitting Diagnosis: SOB (shortness of breath) [R06.02]  Hypoxia [R09.02]  COPD exacerbation (HCC) [J44.1]  Age/Sex: 89 y.o. female        Network  Utilization Review Department  ATTENTION: Please call with any questions or concerns to [phone] and carefully listen to the prompts so that you are directed to the right person. All voicemails are confidential.   For Discharge needs, contact Care Management DC Support Team at [phone] opt. 2  Send all requests for admission clinical reviews, approved or denied determinations and any other requests to dedicated fax number below belonging to the campus where the patient is receiving treatment. List of dedicated fax numbers for the Facilities:  FACILITY NAME UR FAX NUMBER   ADMISSION DENIALS (Administrative/Medical Necessity) [phone]   DISCHARGE SUPPORT TEAM (NETWORK) [phone]   PARENT CHILD HEALTH (Maternity/NICU/Pediatrics) [phone]   [facility] [phone]   [facility] [phone]   [facility] [phone]   [facility] [phone]   [facility] [phone]   [facility] [phone]   [facility] [phone]   [facility] [phone]   [facility] [phone]   [facility] [phone]   [facility] [phone]   [facility] [phone]

## 2025-04-05 NOTE — ASSESSMENT & PLAN NOTE
Patient with longstanding history of pulmonary fibrosis  Not oxygen but at baseline  Plain film imaging suggestive of pulmonary fibrosis-doubt infectious infiltrates  It is possible the patient has evolved some degree of chronic hypoxia given advanced underlying findings  Will treat for component of mild pulmonary edema and monitor for improvement

## 2025-04-05 NOTE — PLAN OF CARE
Problem: PAIN - ADULT  Goal: Verbalizes/displays adequate comfort level or baseline comfort level  Description: Interventions:- Encourage patient to monitor pain and request assistance- Assess pain using appropriate pain scale- Administer analgesics based on type and severity of pain and evaluate response- Implement non-pharmacological measures as appropriate and evaluate response- Consider cultural and social influences on pain and pain management- Notify physician/advanced practitioner if interventions unsuccessful or patient reports new pain  Outcome: Progressing     Problem: INFECTION - ADULT  Goal: Absence or prevention of progression during hospitalization  Description: INTERVENTIONS:- Assess and monitor for signs and symptoms of infection- Monitor lab/diagnostic results- Monitor all insertion sites, i.e. indwelling lines, tubes, and drains- Monitor endotracheal if appropriate and nasal secretions for changes in amount and color- [place] appropriate cooling/warming therapies per order- Administer medications as ordered- Instruct and encourage patient and family to use good hand hygiene technique- Identify and instruct in appropriate isolation precautions for identified infection/condition  Outcome: Progressing  Goal: Absence of fever/infection during neutropenic period  Description: INTERVENTIONS:- Monitor WBC  Outcome: Progressing

## 2025-04-06 LAB
ANION GAP SERPL CALCULATED.3IONS-SCNC: 7 MMOL/L (ref 4–13)
BUN SERPL-MCNC: 39 MG/DL (ref 5–25)
CALCIUM SERPL-MCNC: 8.2 MG/DL (ref 8.4–10.2)
CHLORIDE SERPL-SCNC: 101 MMOL/L (ref 96–108)
CO2 SERPL-SCNC: 29 MMOL/L (ref 21–32)
CREAT SERPL-MCNC: 1.53 MG/DL (ref 0.6–1.3)
ERYTHROCYTE [DISTWIDTH] IN BLOOD BY AUTOMATED COUNT: 14 % (ref 11.6–15.1)
GFR SERPL CREATININE-BSD FRML MDRD: 30 ML/MIN/1.73SQ M
GLUCOSE SERPL-MCNC: 88 MG/DL (ref 65–140)
HCT VFR BLD AUTO: 40.2 % (ref 34.8–46.1)
HGB BLD-MCNC: 13.2 G/DL (ref 11.5–15.4)
MAGNESIUM SERPL-MCNC: 2.1 MG/DL (ref 1.9–2.7)
MCH RBC QN AUTO: 32.9 PG (ref 26.8–34.3)
MCHC RBC AUTO-ENTMCNC: 32.8 G/DL (ref 31.4–37.4)
MCV RBC AUTO: 100 FL (ref 82–98)
PLATELET # BLD AUTO: 162 THOUSANDS/UL (ref 149–390)
PMV BLD AUTO: 8.9 FL (ref 8.9–12.7)
POTASSIUM SERPL-SCNC: 4 MMOL/L (ref 3.5–5.3)
RBC # BLD AUTO: 4.01 MILLION/UL (ref 3.81–5.12)
SODIUM SERPL-SCNC: 137 MMOL/L (ref 135–147)
WBC # BLD AUTO: 7.61 THOUSAND/UL (ref 4.31–10.16)

## 2025-04-06 PROCEDURE — 99232 SBSQ HOSP IP/OBS MODERATE 35: CPT | Performed by: FAMILY MEDICINE

## 2025-04-06 PROCEDURE — 83735 ASSAY OF MAGNESIUM: CPT | Performed by: FAMILY MEDICINE

## 2025-04-06 PROCEDURE — 80048 BASIC METABOLIC PNL TOTAL CA: CPT | Performed by: FAMILY MEDICINE

## 2025-04-06 PROCEDURE — 85027 COMPLETE CBC AUTOMATED: CPT | Performed by: FAMILY MEDICINE

## 2025-04-06 RX ORDER — METHOCARBAMOL 500 MG/1
500 TABLET, FILM COATED ORAL EVERY 6 HOURS PRN
Status: DISCONTINUED | OUTPATIENT
Start: 2025-04-06 | End: 2025-04-08 | Stop reason: HOSPADM

## 2025-04-06 RX ORDER — FUROSEMIDE 10 MG/ML
40 INJECTION INTRAMUSCULAR; INTRAVENOUS ONCE
Status: COMPLETED | OUTPATIENT
Start: 2025-04-06 | End: 2025-04-06

## 2025-04-06 RX ORDER — NYSTATIN 100000 [USP'U]/G
POWDER TOPICAL 2 TIMES DAILY
Status: DISCONTINUED | OUTPATIENT
Start: 2025-04-06 | End: 2025-04-08 | Stop reason: HOSPADM

## 2025-04-06 RX ADMIN — OXYBUTYNIN 10 MG: 5 TABLET, FILM COATED, EXTENDED RELEASE ORAL at 21:20

## 2025-04-06 RX ADMIN — GUAIFENESIN AND DEXTROMETHORPHAN 10 ML: 20; 200 SYRUP ORAL at 05:27

## 2025-04-06 RX ADMIN — DORZOLAMIDE HYDROCHLORIDE AND TIMOLOL MALEATE 1 DROP: 20; 5 SOLUTION OPHTHALMIC at 18:23

## 2025-04-06 RX ADMIN — FUROSEMIDE 40 MG: 10 INJECTION, SOLUTION INTRAMUSCULAR; INTRAVENOUS at 13:32

## 2025-04-06 RX ADMIN — METHOCARBAMOL 500 MG: 500 TABLET ORAL at 18:25

## 2025-04-06 RX ADMIN — METHOCARBAMOL 500 MG: 500 TABLET ORAL at 12:13

## 2025-04-06 RX ADMIN — APIXABAN 2.5 MG: 2.5 TABLET, FILM COATED ORAL at 08:39

## 2025-04-06 RX ADMIN — PRAVASTATIN SODIUM 40 MG: 40 TABLET ORAL at 21:20

## 2025-04-06 RX ADMIN — APIXABAN 2.5 MG: 2.5 TABLET, FILM COATED ORAL at 18:23

## 2025-04-06 RX ADMIN — NYSTATIN: 100000 POWDER TOPICAL at 15:40

## 2025-04-06 RX ADMIN — DORZOLAMIDE HYDROCHLORIDE AND TIMOLOL MALEATE 1 DROP: 20; 5 SOLUTION OPHTHALMIC at 08:40

## 2025-04-06 NOTE — UTILIZATION REVIEW
NOTIFICATION OF OBSERVATION ADMISSION   AUTHORIZATION REQUEST   SERVICING FACILITY:   [place] [address]  Tax ID: [id number]  NPI: [id number] ATTENDING PROVIDER:  Attending Name and NPI#: Corwin Gaitan Do [[id number]]  Address: [address] [place] [address]  Phone: [phone]   ADMISSION INFORMATION:  Place of Service: [facility]  Place of Service Code: 22 CPT Code:   Admitting Diagnosis Code/Description:  SOB (shortness of breath) [R06.02]  Hypoxia [R09.02]  COPD exacerbation (HCC) [J44.1]  Observation Admission Date/Time: 04/04/2025 1151  Discharge Date/Time: No discharge date for patient encounter.     UTILIZATION REVIEW CONTACT:  Josi Choi, Utilization   Network Utilization Review Department  Phone: [phone]  Fax [phone]  Email: [email or web address]  Contact for approvals/pending authorizations, clinical reviews, and discharge.   PHYSICIAN ADVISORY SERVICES:  Medical Necessity Denial & Ickk-tj-Shrw Review  Phone: [phone]  Fax: [phone]  Email: [email or web address]     DISCHARGE SUPPORT TEAM:  For Patients Discharge Needs & Updates  Phone: [phone] opt. 2 Fax: [phone]  Email: [email or web address]

## 2025-04-06 NOTE — PROGRESS NOTES
"Progress Note - Hospitalist   Name: Fang Victor 89 y.o. female I MRN: [id number]  Unit/Bed#: -01 I Date of Admission: 4/4/2025   Date of Service: 4/6/2025 I Hospital Day: 1    Assessment & Plan  Acute respiratory failure with hypoxia (HCC)  Patient presented from SNF  (4/4) with reports of shortness of breath and cough  Patient denied any symptoms and states that she feels \"like she always does\"  Apparently, per SNF report, transient desaturations prompting referral to emergency department    In ED, without SIRS criteria, hemodynamically stable, with apparent desaturation to 88% requiring initiation of 2 L nasal cannula  Subsequent evaluation included plain film chest x-ray which was suggestive of mild pulmonary edema  Patient carries history of pulmonary fibrosis but is not oxygen dependent at baseline  COVID, flu, RSV negative  Received antibiotics in the ED      Index suspicion for transient hypoxia as the result of mild pulmonary edema versus persistent/progressive pulmonary fibrosis  COVID, flu, RSV negative  Low index of suspicion for active infection/pneumonia given absence of SIRS criteria and lack of discrete infiltrates on plain film  CT scan (4/4) as follows:  Stable pulmonary fibrosis and reticular changes/scarring. No acute consolidation.   See plans below  Wean oxygen to off    Today (4/6) liberated from supplemental oxygen at rest.  Mild desaturation with activity to 86 to 87% this morning  Will proceed with additional dose of diuretics today-may require ambulatory pulse oximetry  Anticipate discharge back to assisted living facility tomorrow (4/7)  Pulmonary fibrosis (HCC)  Patient with longstanding history of pulmonary fibrosis  Not oxygen but at baseline  Plain film imaging suggestive of pulmonary fibrosis-doubt infectious infiltrates  It is possible the patient has evolved some degree of chronic hypoxia given advanced underlying findings  Will treat for component of mild pulmonary edema " "and monitor for improvement    Pulmonary edema  Patient with evidence of mild pulmonary edema on plain film imaging \"improved to show (4/4)  Have ordered CT chest to better examine lung parenchyma given presentation with transient hypoxia-pulmonary fibrosis with mild pulmonary edema  Continue to assess daily weight/volume status and determine appropriateness of continuing with diuretic therapy intravenously  Today (4/6) proceed with additional intravenous diuretic therapy-Lasix IV 40 mg IV x 1  Reports compliance with oral Lasix in the outpatient setting  2D echocardiogram (4/4) preserved ejection fraction without evidence of overt diastolic dysfunction  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 Sequential Compression Devices Ordered.    Mobility:   Basic Mobility Inpatient Raw Score: 16  JH-HLM Goal: 5: Stand one or more mins  JH-HLM Achieved: 4: Move to chair/commode  JH-HLM Goal achieved. Continue to encourage appropriate mobility.    Patient Centered Rounds: I performed bedside rounds with nursing staff today.   Discussions with Specialists or Other Care Team Provider:     Education and Discussions with Family / Patient: Patient declined call to .     Current Length of Stay: 1 day(s)  Current Patient Status: Inpatient   Certification Statement:   Discharge Plan: Anticipate discharge tomorrow to prior assisted or independent living " facility.    Code Status: Level 3 - DNAR and DNI    Subjective   Patient is examined at bedside.  States that she is having back spasms.  Reports improvement in breathing.  Denies chest pressure or palpitations.    Objective :  Temp:  [98 °F (36.7 °C)] 98 °F (36.7 °C)  HR:  [68-79] 70  BP: (108-129)/(51-67) 119/67  Resp:  [18] 18  SpO2:  [90 %-92 %] 90 %  O2 Device: None (Room air)    Body mass index is 46.08 kg/m².     Input and Output Summary (last 24 hours):     Intake/Output Summary (Last 24 hours) at 4/6/2025 1225  Last data filed at 4/6/2025 1217  Gross per 24 hour   Intake 200 ml   Output 562 ml   Net -362 ml       Physical Exam  onstitutional:       General: She is not in acute distress.     Appearance: She is obese. She is not ill-appearing, toxic-appearing or diaphoretic.   HENT:      Head: Normocephalic and atraumatic.      Right Ear: External ear normal. There is no impacted cerumen.      Left Ear: External ear normal.      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 No congestion.      Mouth/Throat:      Pharynx: Oropharynx is clear.   Eyes:      General: No scleral icterus.     Conjunctiva/sclera: Conjunctivae normal.   Pulmonary:      Breath sounds: No stridor. Wheezing and rales present. No rhonchi.   Chest:      Chest wall: No tenderness.   Abdominal:      Tenderness: There is no right CVA tenderness, left CVA tenderness or rebound.   Musculoskeletal:      Right lower leg: No edema.      Left lower leg: No edema.   Skin:     Capillary Refill: Capillary refill takes less than 2 seconds.      Findings: No lesion.   Neurological:      General: No focal deficit present.      Mental Status: She is alert and oriented to person, place, and time. Mental status is at baseline.   Psychiatric:         Mood and Affect: Mood normal.         Behavior: Behavior normal.         Thought Content: Thought content normal.         Judgment: Judgment normal.     Lines/Drains:              Lab Results:  I have reviewed the following results:   Results from last 7 days   Lab Units 04/06/25  0532 04/05/25  0548 04/04/25  0938   WBC Thousand/uL 7.61   < > 8.86   HEMOGLOBIN g/dL 13.2   < > 13.8   HEMATOCRIT % 40.2   < > 41.8   PLATELETS Thousands/uL 162   < > 182   SEGS PCT %  --   --  69   LYMPHO PCT %  --   --  15   MONO PCT %  --   --  14*   EOS PCT %  --   --  1    < > = values in this interval not displayed.     Results from last 7 days   Lab Units 04/06/25  0532 04/05/25  0548   SODIUM mmol/L 137 137   POTASSIUM mmol/L 4.0 3.7   CHLORIDE mmol/L 101 103   CO2 mmol/L 29 25   BUN mg/dL 39* 33*   CREATININE mg/dL 1.53* 1.41*   ANION GAP mmol/L 7 9   CALCIUM mg/dL 8.2* 8.2*   ALBUMIN g/dL  --  3.3*   TOTAL BILIRUBIN mg/dL  --  0.48   ALK PHOS U/L  --  60   ALT U/L  --  7   AST U/L  --  18   GLUCOSE RANDOM mg/dL 88 114                 Results from last 7 days   Lab Units 04/04/25  1045 04/04/25  0938   LACTIC ACID mmol/L  --  1.3   PROCALCITONIN ng/ml 0.07  --        Recent Cultures (last 7 days):   Results from last 7 days   Lab Units 04/04/25  0938 04/04/25  0937   BLOOD CULTURE  No Growth at 24 hrs. No Growth at 24 hrs.       Imaging Results Review: I reviewed radiology reports from this admission including: chest xray.  Other Study Results Review: EKG was reviewed.     Last 24 Hours Medication List:     Current Facility-Administered Medications:     albuterol inhalation solution 2.5 mg, Q6H PRN    apixaban (ELIQUIS) tablet 2.5 mg, BID    benzonatate (TESSALON PERLES) capsule 100 mg, TID PRN    dextromethorphan-guaiFENesin (ROBITUSSIN DM) oral syrup 10 mL, Q4H PRN    dorzolamide-timolol (COSOPT) 2-0.5 % ophthalmic solution 1 drop, BID    methocarbamol (ROBAXIN) tablet 500 mg, Q6H PRN    nystatin (MYCOSTATIN) powder, BID    oxybutynin (DITROPAN-XL) 24 hr tablet 10 mg, HS    pravastatin (PRAVACHOL) tablet 40 mg, Daily    Administrative Statements   Today, Patient Was Seen By: Corwin Gaitan, DO  I have spent a  total time of 43 minutes in caring for this patient on the day of the visit/encounter including Instructions for management, Importance of tx compliance, Risk factor reductions, Counseling / Coordination of care, and Communicating with other healthcare professionals .    **Please Note: This note may have been constructed using a voice recognition system.**

## 2025-04-06 NOTE — ASSESSMENT & PLAN NOTE
Lab Results   Component Value Date    EGFR 30 04/06/2025    EGFR 33 04/05/2025    EGFR 34 04/04/2025    CREATININE 1.53 (H) 04/06/2025    CREATININE 1.41 (H) 04/05/2025    CREATININE 1.39 (H) 04/04/2025   Presents at renal baseline  Avoid nephrotoxic agents and hypotension  Mindful of renal dysfunction with volume unloading

## 2025-04-06 NOTE — CASE MANAGEMENT
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 2025     Stage 3b chronic kidney disease (HCC) 2025     Acute kidney injury superimposed on chronic kidney disease  (HCC) 2024     DVT (deep venous thrombosis) (HCC) 2024     ILD (interstitial lung disease) (HCC) 2024     HTN (hypertension) 2024     Lymphedema 2024       LOS (days): 1  Geometric Mean LOS (GMLOS) (days):   Days to GMLOS:     OBJECTIVE:    Risk of Unplanned Readmission Score: 14.18         Current admission status: Inpatient       Preferred Pharmacy:   PATIENT/FAMILY REPORTS NO PREFERRED PHARMACY  No address on file      Primary Care Provider: Simba Jameson MD    Primary Insurance: [facility] REP  Secondary Insurance:     ASSESSMENT:  Active Health Care Proxies       fionacalliekaleigh kellyice Health Care Agent - Sister   Primary Phone: [phone] (Mobile)  Home Phone: [phone]                 Advance Directives  Does patient have a Health Care POA?: Yes  Does patient have Advance Directives?: Yes  Advance Directives: Living will, Power of  for health care  Primary Contact: sanna Peters- sister         Readmission Root Cause  30 Day Readmission: No    Patient Information  Admitted from:: Facility ([facility])  Mental Status: Alert  During Assessment patient was accompanied by: Not accompanied during assessment  Assessment information provided by:: Patient  Primary Caregiver: Other (Comment)  Caregiver's Name:: [facility]  Caregiver's Relationship to Patient:: Facility Staff  Caregiver's Telephone Number:: [phone]  Support Systems: Family members, Other (Comment) ([facility] staff)  County [facility] Residence: [facility]  What city do you live in?: [place]  Home entry access options. Select all that apply.: No steps to enter home  Type of Current Residence: Facility  Upon entering residence, is there a bedroom on the main floor (no further steps)?: Yes  Upon entering residence, is there a bathroom on the main floor (no further steps)?: Yes  Living Arrangements: Lives in Facility  Is patient a ?: No    Activities of Daily Living Prior to Admission  Functional Status: Assistance  Completes ADLs independently?: No  Level of ADL dependence: Assistance  Ambulates independently?: No  Level of ambulatory dependence: Assistance  Does patient use assisted devices?: Yes  Assisted Devices (DME) used: Walker, Wheelchair  Does patient currently own DME?: Yes  What DME does the patient currently own?: Walker, Wheelchair  Does patient have a history of Outpatient Therapy (PT/OT)?: No  Does the patient have a history of Short-Term Rehab?: Yes ([place] 2020)  Does patient currently have HHC?: Yes    Current Home Health Care  Type of Current Home Care Services: Home PT, Nurse visit  Home Health Agency Name:: [facility]  Current Home Health Follow-Up Provider:: PCP    Patient Information Continued  Income Source: Pension/USP  Does patient have prescription coverage?: Yes  Can the patient afford their medications and any related supplies (such as glucometers or test strips)?: Yes  Does patient receive dialysis treatments?: No  Does patient have a history of substance abuse?: No  Does patient have a history of Mental Health Diagnosis?: No         Means of Transportation  Means of Transport to Appts:: Other (Comment) (EMS has agreement with [place] [facility] for transport)      Social Determinants of Health (SDOH)      Flowsheet Row Most Recent  Value   Housing Stability    In the last 12 months, was there a time when you were not able to pay the mortgage or rent on time? N   In the past 12 months, how many times have you moved where you were living? 0   At any time in the past 12 months, were you homeless or living in a shelter (including now)? N   Transportation Needs    In the past 12 months, has lack of transportation kept you from medical appointments or from getting medications? no   In the past 12 months, has lack of transportation kept you from meetings, work, or from getting things needed for daily living? No   Food Insecurity    Within the past 12 months, you worried that your food would run out before you got the money to buy more. Never true   Within the past 12 months, the food you bought just didn't last and you didn't have money to get more. Never true   Utilities    In the past 12 months has the electric, gas, oil, or water company threatened to shut off services in your home? No            DISCHARGE DETAILS:    Discharge planning discussed with:: patient  Freedom of Choice: Yes  Comments - Freedom of Choice: return to [facility], pt is requesting [facility] for SN, PT/OT- referral placed in [facility] for HHC.   Pt resides at [facility], pt is able to ambulate with a walker and utilizes wheelchair for longer distances. [facility] assist with meals. Discharge planning discussed, plan is to return to [facility] when medically cleared for discharge. Pt will need transport set up via .Discussed with patient that transportation via  is not covered by insurance and patient will be billed for this service. Patient verbalized understanding and agreeable.   5996- message received from [facility] unable to accept patient.   [place] referral for HHC placed in aidin.      CM contacted family/caregiver?: Yes  Were Treatment Team discharge recommendations reviewed with patient/caregiver?: Yes  Did patient/caregiver verbalize understanding of patient care  needs?: Yes       Contacts  Patient Contacts: Yazmin Faith- sister  Relationship to Patient:: Family  Contact Method: Phone  Phone Number: [phone]  Reason/Outcome: Discharge Planning    Requested Home Health Care         Is the patient interested in HHC at discharge?: Yes  Home Health Discipline requested:: Nursing, Occupational Therapy, Physical Therapy  Home Health Agency Name:: [facility]  HHA External Referral Reason (only applicable if external HHA name selected): Patient has established relationship with provider  Home Health Follow-Up Provider:: PCP  Home Health Services Needed:: Evaluate Functional Status and Safety, Gait/ADL Training, Strengthening/Theraputic Exercises to Improve Function  Homebound Criteria Met:: Uses an Assist Device (i.e. cane, walker, etc), Requires the Assistance of Another Person for Safe Ambulation or to Leave the Home, Requires Medical Transportation  Supporting Clincal Findings:: Limited Endurance    DME Referral Provided  Referral made for DME?: No    Other Referral/Resources/Interventions Provided:  Interventions: HHC, Assisted Living         Treatment Team Recommendation: Facility Return, Assisted Living  Discharge Destination Plan:: Facility Return, Assisted Living  Transport at Discharge : Wheelchair van

## 2025-04-06 NOTE — ASSESSMENT & PLAN NOTE
"Patient with evidence of mild pulmonary edema on plain film imaging \"improved to show (4/4)  Have ordered CT chest to better examine lung parenchyma given presentation with transient hypoxia-pulmonary fibrosis with mild pulmonary edema  Continue to assess daily weight/volume status and determine appropriateness of continuing with diuretic therapy intravenously  Today (4/6) proceed with additional intravenous diuretic therapy-Lasix IV 40 mg IV x 1  Reports compliance with oral Lasix in the outpatient setting  2D echocardiogram (4/4) preserved ejection fraction without evidence of overt diastolic dysfunction  "

## 2025-04-06 NOTE — PLAN OF CARE
Problem: PAIN - ADULT  Goal: Verbalizes/displays adequate comfort level or baseline comfort level  Description: Interventions:- Encourage patient to monitor pain and request assistance- Assess pain using appropriate pain scale- Administer analgesics based on type and severity of pain and evaluate response- Implement non-pharmacological measures as appropriate and evaluate response- Consider cultural and social influences on pain and pain management- Notify physician/advanced practitioner if interventions unsuccessful or patient reports new pain  Outcome: Progressing     Problem: INFECTION - ADULT  Goal: Absence or prevention of progression during hospitalization  Description: INTERVENTIONS:- Assess and monitor for signs and symptoms of infection- Monitor lab/diagnostic results- Monitor all insertion sites, i.e. indwelling lines, tubes, and drains- Monitor endotracheal if appropriate and nasal secretions for changes in amount and color- [place] appropriate cooling/warming therapies per order- Administer medications as ordered- Instruct and encourage patient and family to use good hand hygiene technique- Identify and instruct in appropriate isolation precautions for identified infection/condition  Outcome: Progressing  Goal: Absence of fever/infection during neutropenic period  Description: INTERVENTIONS:- Monitor WBC  Outcome: Progressing

## 2025-04-06 NOTE — ASSESSMENT & PLAN NOTE
"Patient presented from SNF  (4/4) with reports of shortness of breath and cough  Patient denied any symptoms and states that she feels \"like she always does\"  Apparently, per [facility] report, transient desaturations prompting referral to emergency department    In ED, without SIRS criteria, hemodynamically stable, with apparent desaturation to 88% requiring initiation of 2 L nasal cannula  Subsequent evaluation included plain film chest x-ray which was suggestive of mild pulmonary edema  Patient carries history of pulmonary fibrosis but is not oxygen dependent at baseline  COVID, flu, RSV negative  Received antibiotics in the ED      Index suspicion for transient hypoxia as the result of mild pulmonary edema versus persistent/progressive pulmonary fibrosis  COVID, flu, RSV negative  Low index of suspicion for active infection/pneumonia given absence of SIRS criteria and lack of discrete infiltrates on plain film  CT scan (4/4) as follows:  Stable pulmonary fibrosis and reticular changes/scarring. No acute consolidation.   See plans below  Wean oxygen to off    Today (4/6) liberated from supplemental oxygen at rest.  Mild desaturation with activity to 86 to 87% this morning  Will proceed with additional dose of diuretics today-may require ambulatory pulse oximetry  Anticipate discharge back to assisted living facility tomorrow (4/7)  "

## 2025-04-06 NOTE — PLAN OF CARE
Problem: Prexisting or High Potential for Compromised Skin Integrity  Goal: Skin integrity is maintained or improved  Description: INTERVENTIONS:- Identify patients at risk for skin breakdown- Assess and monitor skin integrity- Assess and monitor nutrition and hydration status- Monitor labs - Assess for incontinence - Turn and reposition patient- Assist with mobility/ambulation- Relieve pressure over bony prominences- Avoid friction and shearing- Provide appropriate hygiene as needed including keeping skin clean and dry- Evaluate need for skin moisturizer/barrier cream- Collaborate with interdisciplinary team - Patient/family teaching- Consider wound care consult   Outcome: Progressing     Problem: PAIN - ADULT  Goal: Verbalizes/displays adequate comfort level or baseline comfort level  Description: Interventions:- Encourage patient to monitor pain and request assistance- Assess pain using appropriate pain scale- Administer analgesics based on type and severity of pain and evaluate response- Implement non-pharmacological measures as appropriate and evaluate response- Consider cultural and social influences on pain and pain management- Notify physician/advanced practitioner if interventions unsuccessful or patient reports new pain  Outcome: Progressing     Problem: INFECTION - ADULT  Goal: Absence or prevention of progression during hospitalization  Description: INTERVENTIONS:- Assess and monitor for signs and symptoms of infection- Monitor lab/diagnostic results- Monitor all insertion sites, i.e. indwelling lines, tubes, and drains- Monitor endotracheal if appropriate and nasal secretions for changes in amount and color- [place] appropriate cooling/warming therapies per order- Administer medications as ordered- Instruct and encourage patient and family to use good hand hygiene technique- Identify and instruct in appropriate isolation precautions for identified infection/condition  Outcome: Progressing  Goal:  Absence of fever/infection during neutropenic period  Description: INTERVENTIONS:- Monitor WBC  Outcome: Progressing     Problem: SAFETY ADULT  Goal: Patient will remain free of falls  Description: INTERVENTIONS:- Educate patient/family on patient safety including physical limitations- Instruct patient to call for assistance with activity - Consult OT/PT to assist with strengthening/mobility - Keep Call bell within reach- Keep bed low and locked with side rails adjusted as appropriate- Keep care items and personal belongings within reach- Initiate and maintain comfort rounds- Make Fall Risk Sign visible to staff- Offer Toileting every  Hours, in advance of need- Initiate/Maintain alarm- Obtain necessary fall risk management equipment: - Apply yellow socks and bracelet for high fall risk patients- Consider moving patient to room near nurses station  Outcome: Progressing  Goal: Maintain or return to baseline ADL function  Description: INTERVENTIONS:-  Assess patient's ability to carry out ADLs; assess patient's baseline for ADL function and identify physical deficits which impact ability to perform ADLs (bathing, care of mouth/teeth, toileting, grooming, dressing, etc.)- Assess/evaluate cause of self-care deficits - Assess range of motion- Assess patient's mobility; develop plan if impaired- Assess patient's need for assistive devices and provide as appropriate- Encourage maximum independence but intervene and supervise when necessary- Involve family in performance of ADLs- Assess for home care needs following discharge - Consider OT consult to assist with ADL evaluation and planning for discharge- Provide patient education as appropriate  Outcome: Progressing  Goal: Maintains/Returns to pre admission functional level  Description: INTERVENTIONS:- Perform AM-PAC 6 Click Basic Mobility/ Daily Activity assessment daily.- Set and communicate daily mobility goal to care team and patient/family/caregiver. - Collaborate  with rehabilitation services on mobility goals if consulted- Perform Range of Motion  times a day.- Reposition patient every  hours.- Dangle patient  times a day- Stand patient  times a day- Ambulate patient  times a day- Out of bed to chair  times a day - Out of bed for meals  times a day- Out of bed for toileting- Record patient progress and toleration of activity level   Outcome: Progressing     Problem: DISCHARGE PLANNING  Goal: Discharge to home or other facility with appropriate resources  Description: INTERVENTIONS:- Identify barriers to discharge w/patient and caregiver- Arrange for needed discharge resources and transportation as appropriate- Identify discharge learning needs (meds, wound care, etc.)- Arrange for interpretive services to assist at discharge as needed- Refer to Case Management Department for coordinating discharge planning if the patient needs post-hospital services based on physician/advanced practitioner order or complex needs related to functional status, cognitive ability, or social support system  Outcome: Progressing     Problem: Knowledge Deficit  Goal: Patient/family/caregiver demonstrates understanding of disease process, treatment plan, medications, and discharge instructions  Description: Complete learning assessment and assess knowledge base.Interventions:- Provide teaching at level of understanding- Provide teaching via preferred learning methods  Outcome: Progressing     Problem: RESPIRATORY - ADULT  Goal: Achieves optimal ventilation and oxygenation  Description: INTERVENTIONS:- Assess for changes in respiratory status- Assess for changes in mentation and behavior- Position to facilitate oxygenation and minimize respiratory effort- Oxygen administered by appropriate delivery if ordered- Initiate smoking cessation education as indicated- Encourage broncho-pulmonary hygiene including cough, deep breathe, Incentive Spirometry- Assess the need for suctioning and aspirate as  needed- Assess and instruct to report SOB or any respiratory difficulty- Respiratory Therapy support as indicated  Outcome: Progressing

## 2025-04-07 LAB
ANION GAP SERPL CALCULATED.3IONS-SCNC: 7 MMOL/L (ref 4–13)
BUN SERPL-MCNC: 45 MG/DL (ref 5–25)
CALCIUM SERPL-MCNC: 8.5 MG/DL (ref 8.4–10.2)
CHLORIDE SERPL-SCNC: 99 MMOL/L (ref 96–108)
CO2 SERPL-SCNC: 32 MMOL/L (ref 21–32)
CREAT SERPL-MCNC: 1.38 MG/DL (ref 0.6–1.3)
ERYTHROCYTE [DISTWIDTH] IN BLOOD BY AUTOMATED COUNT: 13.8 % (ref 11.6–15.1)
GFR SERPL CREATININE-BSD FRML MDRD: 34 ML/MIN/1.73SQ M
GLUCOSE SERPL-MCNC: 92 MG/DL (ref 65–140)
HCT VFR BLD AUTO: 42.3 % (ref 34.8–46.1)
HGB BLD-MCNC: 13.9 G/DL (ref 11.5–15.4)
MCH RBC QN AUTO: 32.9 PG (ref 26.8–34.3)
MCHC RBC AUTO-ENTMCNC: 32.9 G/DL (ref 31.4–37.4)
MCV RBC AUTO: 100 FL (ref 82–98)
PLATELET # BLD AUTO: 168 THOUSANDS/UL (ref 149–390)
PMV BLD AUTO: 8.8 FL (ref 8.9–12.7)
POTASSIUM SERPL-SCNC: 3.6 MMOL/L (ref 3.5–5.3)
RBC # BLD AUTO: 4.22 MILLION/UL (ref 3.81–5.12)
SODIUM SERPL-SCNC: 138 MMOL/L (ref 135–147)
WBC # BLD AUTO: 6.89 THOUSAND/UL (ref 4.31–10.16)

## 2025-04-07 PROCEDURE — 99232 SBSQ HOSP IP/OBS MODERATE 35: CPT | Performed by: FAMILY MEDICINE

## 2025-04-07 PROCEDURE — 85027 COMPLETE CBC AUTOMATED: CPT | Performed by: FAMILY MEDICINE

## 2025-04-07 PROCEDURE — 80048 BASIC METABOLIC PNL TOTAL CA: CPT | Performed by: FAMILY MEDICINE

## 2025-04-07 RX ADMIN — DORZOLAMIDE HYDROCHLORIDE AND TIMOLOL MALEATE 1 DROP: 20; 5 SOLUTION OPHTHALMIC at 17:56

## 2025-04-07 RX ADMIN — NYSTATIN: 100000 POWDER TOPICAL at 09:14

## 2025-04-07 RX ADMIN — APIXABAN 2.5 MG: 2.5 TABLET, FILM COATED ORAL at 17:56

## 2025-04-07 RX ADMIN — APIXABAN 2.5 MG: 2.5 TABLET, FILM COATED ORAL at 09:14

## 2025-04-07 RX ADMIN — METHOCARBAMOL 500 MG: 500 TABLET ORAL at 23:21

## 2025-04-07 RX ADMIN — OXYBUTYNIN 10 MG: 5 TABLET, FILM COATED, EXTENDED RELEASE ORAL at 23:13

## 2025-04-07 RX ADMIN — METHOCARBAMOL 500 MG: 500 TABLET ORAL at 09:14

## 2025-04-07 RX ADMIN — NYSTATIN: 100000 POWDER TOPICAL at 17:57

## 2025-04-07 RX ADMIN — GUAIFENESIN AND DEXTROMETHORPHAN 10 ML: 20; 200 SYRUP ORAL at 09:19

## 2025-04-07 RX ADMIN — DORZOLAMIDE HYDROCHLORIDE AND TIMOLOL MALEATE 1 DROP: 20; 5 SOLUTION OPHTHALMIC at 09:14

## 2025-04-07 RX ADMIN — PRAVASTATIN SODIUM 40 MG: 40 TABLET ORAL at 23:13

## 2025-04-07 NOTE — CASE MANAGEMENT
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 2025     Stage 3b chronic kidney disease (HCC) 2025     Acute kidney injury superimposed on chronic kidney disease  (HCC) 2024     DVT (deep venous thrombosis) (HCC) 2024     ILD (interstitial lung disease) (HCC) 2024     HTN (hypertension) 2024     Lymphedema 2024       LOS (days): 2  Geometric Mean LOS (GMLOS) (days): 3.5  Days to GMLOS:1.4     OBJECTIVE:  Risk of Unplanned Readmission Score: 13.14         Current admission status: Inpatient   Preferred Pharmacy:   PATIENT/FAMILY REPORTS NO PREFERRED PHARMACY  No address on file      Primary Care Provider: Simba Jameson MD    Primary Insurance: [facility] REP  Secondary Insurance:     DISCHARGE DETAILS:    Discharge planning discussed with:: patient and Yazmin huitron  Freedom of Choice: Yes  Comments - Freedom of Choice: [facility] referral for STR                     Requested Home Health Care         Is the patient interested in HHC at discharge?: No    DME Referral Provided  Referral made for DME?: No    Other Referral/Resources/Interventions Provided:  Interventions: Short Term Rehab  Referral Comments: [facility] STR         Treatment Team Recommendation: Home with Home Health Care  Discharge Destination Plan:: Short Term Rehab            CM called patients Yazmin huitron, to discuss STR recommendation from [facility] [place].  CM reviewed [facility] Provider Choice List.  Yazmin chose [facility] indicating her  spouse has been there in past and had success with PT.    CM met with patient to discuss STR facilities available and discuss with patient sister's choice for [facility]. Patient in agreement with sisters choice.    CM to follow for therapy assessment to submit auth for [facility].

## 2025-04-07 NOTE — PROGRESS NOTES
"Progress Note - Hospitalist   Name: Fang Victor 89 y.o. female I MRN: [id number]  Unit/Bed#: -01 I Date of Admission: 4/4/2025   Date of Service: 4/7/2025 I Hospital Day: 2    Assessment & Plan  Acute respiratory failure with hypoxia (HCC)  Patient presented from SNF  (4/4) with reports of shortness of breath and cough  Patient denied any symptoms and states that she feels \"like she always does\"  Apparently, per SNF report, transient desaturations prompting referral to emergency department    In ED, without SIRS criteria, hemodynamically stable, with apparent desaturation to 88% requiring initiation of 2 L nasal cannula  Subsequent evaluation included plain film chest x-ray which was suggestive of mild pulmonary edema  Patient carries history of pulmonary fibrosis but is not oxygen dependent at baseline  COVID, flu, RSV negative  Received antibiotics in the ED      Index suspicion for transient hypoxia as the result of mild pulmonary edema versus persistent/progressive pulmonary fibrosis  COVID, flu, RSV negative  Low index of suspicion for active infection/pneumonia given absence of SIRS criteria and lack of discrete infiltrates on plain film  CT scan (4/4) as follows:  Stable pulmonary fibrosis and reticular changes/scarring. No acute consolidation.   See plans below  Wean oxygen to off    (4/6) liberated from supplemental oxygen at rest.  Mild desaturation with activity to 86 to 87%  Additional dose of diuretics (4/6)  Overnight (4/6-4/7) transient desaturations overnight prompting reinitiation of 2 L nasal cannula  This is likely progression of underlying pulmonary fibrosis  Have placed consultation to pulmonary medicine as patient was to be followed in the outpatient setting with an appointment tomorrow (4/8)  Will obtain nocturnal pulse oximetry  Pulmonary fibrosis (HCC)  Patient with longstanding history of pulmonary fibrosis  Not oxygen dependent at baseline   plain film imaging suggestive of " "pulmonary fibrosis-doubt infectious infiltrates  It is possible the patient has evolved some degree of chronic hypoxia given advanced underlying findings  Will treat for component of mild pulmonary edema and monitor for improvement    Pulmonary edema  Patient with evidence of mild pulmonary edema on plain film imaging \"improved to show (4/4)  Have ordered CT chest to better examine lung parenchyma given presentation with transient hypoxia-pulmonary fibrosis with mild pulmonary edema  Continue to assess daily weight/volume status and determine appropriateness of continuing with diuretic therapy intravenously  (4/6) proceeded with additional intravenous diuretic therapy-Lasix IV 40 mg IV x 1  Reports compliance with oral Lasix in the outpatient setting  2D echocardiogram (4/4) preserved ejection fraction without evidence of overt diastolic dysfunction  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 Sequential Compression Devices Ordered.    Mobility:   Basic Mobility Inpatient Raw Score: 16  JH-HLM Goal: 5: Stand one or more mins  JH-HLM Achieved: 4: Move to chair/commode  JH-HLM Goal achieved. Continue to encourage appropriate mobility.    Patient Centered Rounds: I performed bedside rounds with nursing staff today.   Discussions with Specialists or Other Care Team Provider:     Education and Discussions with Family / Patient: Patient declined call to . "     Current Length of Stay: 2 day(s)  Current Patient Status: Inpatient   Certification Statement:  Discharge Plan: Anticipate discharge tomorrow to rehab facility.    Code Status: Level 3 - DNAR and DNI    Subjective   Examined at bedside.  Without complaint.  Desaturations noted overnight.    Objective :  Temp:  [97.8 °F (36.6 °C)-98.6 °F (37 °C)] 98.6 °F (37 °C)  HR:  [58-63] 59  BP: (121-135)/(44-59) 135/53  Resp:  [17-20] 20  SpO2:  [89 %-90 %] 89 %  O2 Device: None (Room air)    Body mass index is 46.44 kg/m².     Input and Output Summary (last 24 hours):     Intake/Output Summary (Last 24 hours) at 4/7/2025 1618  Last data filed at 4/7/2025 1603  Gross per 24 hour   Intake 480 ml   Output 2075 ml   Net -1595 ml       Physical Exam  nstitutional:       General: She is not in acute distress.     Appearance: She is obese. She is not ill-appearing, toxic-appearing or diaphoretic.   HENT:      Head: Normocephalic and atraumatic.      Right Ear: External ear normal. There is no impacted cerumen.      Left Ear: External ear normal.      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 No congestion.      Mouth/Throat:      Pharynx: Oropharynx is clear.   Eyes:      General: No scleral icterus.     Conjunctiva/sclera: Conjunctivae normal.   Pulmonary:      Breath sounds: No stridor. Wheezing and rales present. No rhonchi.   Chest:      Chest wall: No tenderness.   Abdominal:      Tenderness: There is no right CVA tenderness, left CVA tenderness or rebound.   Musculoskeletal:      Right lower leg: No edema.      Left lower leg: No edema.   Skin:     Capillary Refill: Capillary refill takes less than 2 seconds.      Findings: No lesion.   Neurological:      General: No focal deficit present.      Mental Status: She is alert and oriented to person, place, and time. Mental status is at baseline.   Psychiatric:         Mood and Affect: Mood normal.         Behavior: Behavior normal.         Thought Content:  Thought content normal.         Judgment: Judgment normal.               Lab Results: I have reviewed the following results:   Results from last 7 days   Lab Units 04/07/25  0552 04/05/25  0548 04/04/25  0938   WBC Thousand/uL 6.89   < > 8.86   HEMOGLOBIN g/dL 13.9   < > 13.8   HEMATOCRIT % 42.3   < > 41.8   PLATELETS Thousands/uL 168   < > 182   SEGS PCT %  --   --  69   LYMPHO PCT %  --   --  15   MONO PCT %  --   --  14*   EOS PCT %  --   --  1    < > = values in this interval not displayed.     Results from last 7 days   Lab Units 04/07/25  0552 04/06/25  0532 04/05/25  0548   SODIUM mmol/L 138   < > 137   POTASSIUM mmol/L 3.6   < > 3.7   CHLORIDE mmol/L 99   < > 103   CO2 mmol/L 32   < > 25   BUN mg/dL 45*   < > 33*   CREATININE mg/dL 1.38*   < > 1.41*   ANION GAP mmol/L 7   < > 9   CALCIUM mg/dL 8.5   < > 8.2*   ALBUMIN g/dL  --   --  3.3*   TOTAL BILIRUBIN mg/dL  --   --  0.48   ALK PHOS U/L  --   --  60   ALT U/L  --   --  7   AST U/L  --   --  18   GLUCOSE RANDOM mg/dL 92   < > 114    < > = values in this interval not displayed.                 Results from last 7 days   Lab Units 04/04/25  1045 04/04/25  0938   LACTIC ACID mmol/L  --  1.3   PROCALCITONIN ng/ml 0.07  --        Recent Cultures (last 7 days):   Results from last 7 days   Lab Units 04/04/25  0938 04/04/25  0937   BLOOD CULTURE  No Growth at 48 hrs. No Growth at 48 hrs.       Imaging Results Review: I reviewed radiology reports from this admission including: chest xray and Echocardiogram.  Other Study Results Review: EKG was reviewed.     Last 24 Hours Medication List:     Current Facility-Administered Medications:     albuterol inhalation solution 2.5 mg, Q6H PRN    apixaban (ELIQUIS) tablet 2.5 mg, BID    benzonatate (TESSALON PERLES) capsule 100 mg, TID PRN    dextromethorphan-guaiFENesin (ROBITUSSIN DM) oral syrup 10 mL, Q4H PRN    dorzolamide-timolol (COSOPT) 2-0.5 % ophthalmic solution 1 drop, BID    methocarbamol (ROBAXIN) tablet 500 mg,  Q6H PRN    nystatin (MYCOSTATIN) powder, BID    oxybutynin (DITROPAN-XL) 24 hr tablet 10 mg, HS    pravastatin (PRAVACHOL) tablet 40 mg, Daily    Administrative Statements   Today, Patient Was Seen By: Corwin Gaitan, DO  I have spent a total time of 34 minutes in caring for this patient on the day of the visit/encounter including Prognosis, Risks and benefits of tx options, Patient and family education, Risk factor reductions, Impressions, and Counseling / Coordination of care.    **Please Note: This note may have been constructed using a voice recognition system.**

## 2025-04-07 NOTE — PLAN OF CARE
Problem: Prexisting or High Potential for Compromised Skin Integrity  Goal: Skin integrity is maintained or improved  Description: INTERVENTIONS:- Identify patients at risk for skin breakdown- Assess and monitor skin integrity- Assess and monitor nutrition and hydration status- Monitor labs - Assess for incontinence - Turn and reposition patient- Assist with mobility/ambulation- Relieve pressure over bony prominences- Avoid friction and shearing- Provide appropriate hygiene as needed including keeping skin clean and dry- Evaluate need for skin moisturizer/barrier cream- Collaborate with interdisciplinary team - Patient/family teaching- Consider wound care consult   Outcome: Progressing     Problem: PAIN - ADULT  Goal: Verbalizes/displays adequate comfort level or baseline comfort level  Description: Interventions:- Encourage patient to monitor pain and request assistance- Assess pain using appropriate pain scale- Administer analgesics based on type and severity of pain and evaluate response- Implement non-pharmacological measures as appropriate and evaluate response- Consider cultural and social influences on pain and pain management- Notify physician/advanced practitioner if interventions unsuccessful or patient reports new pain  Outcome: Progressing     Problem: INFECTION - ADULT  Goal: Absence or prevention of progression during hospitalization  Description: INTERVENTIONS:- Assess and monitor for signs and symptoms of infection- Monitor lab/diagnostic results- Monitor all insertion sites, i.e. indwelling lines, tubes, and drains- Monitor endotracheal if appropriate and nasal secretions for changes in amount and color- [place] appropriate cooling/warming therapies per order- Administer medications as ordered- Instruct and encourage patient and family to use good hand hygiene technique- Identify and instruct in appropriate isolation precautions for identified infection/condition  Outcome: Progressing  Goal:  Absence of fever/infection during neutropenic period  Description: INTERVENTIONS:- Monitor WBC  Outcome: Progressing     Problem: SAFETY ADULT  Goal: Patient will remain free of falls  Description: INTERVENTIONS:- Educate patient/family on patient safety including physical limitations- Instruct patient to call for assistance with activity - Consult OT/PT to assist with strengthening/mobility - Keep Call bell within reach- Keep bed low and locked with side rails adjusted as appropriate- Keep care items and personal belongings within reach- Initiate and maintain comfort rounds- Make Fall Risk Sign visible to staff- Offer Toileting every 2 Hours, in advance of need- Initiate/Maintain bed alarm- Obtain necessary fall risk management equipment: - Apply yellow socks and bracelet for high fall risk patients- Consider moving patient to room near nurses station  Outcome: Progressing  Goal: Maintain or return to baseline ADL function  Description: INTERVENTIONS:-  Assess patient's ability to carry out ADLs; assess patient's baseline for ADL function and identify physical deficits which impact ability to perform ADLs (bathing, care of mouth/teeth, toileting, grooming, dressing, etc.)- Assess/evaluate cause of self-care deficits - Assess range of motion- Assess patient's mobility; develop plan if impaired- Assess patient's need for assistive devices and provide as appropriate- Encourage maximum independence but intervene and supervise when necessary- Involve family in performance of ADLs- Assess for home care needs following discharge - Consider OT consult to assist with ADL evaluation and planning for discharge- Provide patient education as appropriate  Outcome: Progressing  Goal: Maintains/Returns to pre admission functional level  Description: INTERVENTIONS:- Perform AM-PAC 6 Click Basic Mobility/ Daily Activity assessment daily.- Set and communicate daily mobility goal to care team and patient/family/caregiver. -  Collaborate with rehabilitation services on mobility goals if consulted- Reposition patient every 2 hours.- Dangle patient 3 times a day- Stand patient 3 times a day- Ambulate patient 3 times a day- Out of bed to chair 3 times a day - Out of bed for meals 3 times a day- Out of bed for toileting- Record patient progress and toleration of activity level   Outcome: Progressing     Problem: DISCHARGE PLANNING  Goal: Discharge to home or other facility with appropriate resources  Description: INTERVENTIONS:- Identify barriers to discharge w/patient and caregiver- Arrange for needed discharge resources and transportation as appropriate- Identify discharge learning needs (meds, wound care, etc.)- Arrange for interpretive services to assist at discharge as needed- Refer to Case Management Department for coordinating discharge planning if the patient needs post-hospital services based on physician/advanced practitioner order or complex needs related to functional status, cognitive ability, or social support system  Outcome: Progressing     Problem: Knowledge Deficit  Goal: Patient/family/caregiver demonstrates understanding of disease process, treatment plan, medications, and discharge instructions  Description: Complete learning assessment and assess knowledge base.Interventions:- Provide teaching at level of understanding- Provide teaching via preferred learning methods  Outcome: Progressing     Problem: RESPIRATORY - ADULT  Goal: Achieves optimal ventilation and oxygenation  Description: INTERVENTIONS:- Assess for changes in respiratory status- Assess for changes in mentation and behavior- Position to facilitate oxygenation and minimize respiratory effort- Oxygen administered by appropriate delivery if ordered- Initiate smoking cessation education as indicated- Encourage broncho-pulmonary hygiene including cough, deep breathe, Incentive Spirometry- Assess the need for suctioning and aspirate as needed- Assess and instruct  to report SOB or any respiratory difficulty- Respiratory Therapy support as indicated  Outcome: Progressing

## 2025-04-07 NOTE — CASE MANAGEMENT
Case Management Discharge Planning Note    Patient name Fang Victor  Location /-01 MRN [id number]  : 1935 Date 2025       Current Admission Date: 2025  Current Admission Diagnosis:Acute respiratory failure with hypoxia (HCC)   Patient Active Problem List    Diagnosis Date Noted Date Diagnosed    Acute respiratory failure with hypoxia (HCC) 2025     Pulmonary fibrosis (HCC) 2025     Pulmonary edema 2025     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 2025     Stage 3b chronic kidney disease (HCC) 2025     Acute kidney injury superimposed on chronic kidney disease  (HCC) 2024     DVT (deep venous thrombosis) (HCC) 2024     ILD (interstitial lung disease) (HCC) 2024     HTN (hypertension) 2024     Lymphedema 2024       LOS (days): 2  Geometric Mean LOS (GMLOS) (days):   Days to GMLOS:     OBJECTIVE:  Risk of Unplanned Readmission Score: 12.71         Current admission status: Inpatient   Preferred Pharmacy:   PATIENT/FAMILY REPORTS NO PREFERRED PHARMACY  No address on file      Primary Care Provider: Simba Jameson MD    Primary Insurance: [facility] RE[facility]  Secondary Insurance:     DISCHARGE DETAILS:        CM faxed most recent PT, OT and Progress Note to [facility] to assure patient can return.  CM to follow.      ** [id number] CM received [facility]  Day indicated patient can typically transfer from recliner to  and  to toilet on her own and only recently has been requiring assistance at [facility].  Day would like to see patient discharge to skilled nursing where she can get daily therapy for patient to return to her independent transfers, if possible.  Patient has been at [facility] since 2021.    CM met with patient to discuss [facility] request for patient to discharge to [facility]. Patient disappointed but agrees she used to complete independent transfers.    Patient requests STR choices be reviewed  with her sister who lives in [facility].    CM submitted [facility] referral for STR.

## 2025-04-07 NOTE — ASSESSMENT & PLAN NOTE
"Patient presented from SNF  (4/4) with reports of shortness of breath and cough  Patient denied any symptoms and states that she feels \"like she always does\"  Apparently, per [facility] report, transient desaturations prompting referral to emergency department    In ED, without SIRS criteria, hemodynamically stable, with apparent desaturation to 88% requiring initiation of 2 L nasal cannula  Subsequent evaluation included plain film chest x-ray which was suggestive of mild pulmonary edema  Patient carries history of pulmonary fibrosis but is not oxygen dependent at baseline  COVID, flu, RSV negative  Received antibiotics in the ED      Index suspicion for transient hypoxia as the result of mild pulmonary edema versus persistent/progressive pulmonary fibrosis  COVID, flu, RSV negative  Low index of suspicion for active infection/pneumonia given absence of SIRS criteria and lack of discrete infiltrates on plain film  CT scan (4/4) as follows:  Stable pulmonary fibrosis and reticular changes/scarring. No acute consolidation.   See plans below  Wean oxygen to off    (4/6) liberated from supplemental oxygen at rest.  Mild desaturation with activity to 86 to 87%  Additional dose of diuretics (4/6)  Overnight (4/6-4/7) transient desaturations overnight prompting reinitiation of 2 L nasal cannula  This is likely progression of underlying pulmonary fibrosis  Have placed consultation to pulmonary medicine as patient was to be followed in the outpatient setting with an appointment tomorrow (4/8)  Will obtain nocturnal pulse oximetry  "

## 2025-04-07 NOTE — PROGRESS NOTES
Patient:    MRN:  [id number]    Zach Request ID:  [id number]    Level of care reserved:  Skilled Nursing Facility    Partner Reserved:  [place] [address] [phone]    Clinical needs requested:    Geography searched:  20 miles around [id number]    Start of Service:    Request sent:  10:28am EDT on 4/7/2025 by Neda Hauser    Partner reserved:  2:04pm EDT on 4/7/2025 by Neda Hauser    Choice list shared:  2:04pm EDT on 4/7/2025 by Neda Hauser

## 2025-04-07 NOTE — ASSESSMENT & PLAN NOTE
"Patient with evidence of mild pulmonary edema on plain film imaging \"improved to show (4/4)  Have ordered CT chest to better examine lung parenchyma given presentation with transient hypoxia-pulmonary fibrosis with mild pulmonary edema  Continue to assess daily weight/volume status and determine appropriateness of continuing with diuretic therapy intravenously  (4/6) proceeded with additional intravenous diuretic therapy-Lasix IV 40 mg IV x 1  Reports compliance with oral Lasix in the outpatient setting  2D echocardiogram (4/4) preserved ejection fraction without evidence of overt diastolic dysfunction  "

## 2025-04-07 NOTE — ASSESSMENT & PLAN NOTE
Patient with longstanding history of pulmonary fibrosis  Not oxygen dependent at baseline   plain film imaging suggestive of pulmonary fibrosis-doubt infectious infiltrates  It is possible the patient has evolved some degree of chronic hypoxia given advanced underlying findings  Will treat for component of mild pulmonary edema and monitor for improvement

## 2025-04-07 NOTE — ASSESSMENT & PLAN NOTE
Lab Results   Component Value Date    EGFR 34 04/07/2025    EGFR 30 04/06/2025    EGFR 33 04/05/2025    CREATININE 1.38 (H) 04/07/2025    CREATININE 1.53 (H) 04/06/2025    CREATININE 1.41 (H) 04/05/2025   Presents at renal baseline  Avoid nephrotoxic agents and hypotension  Mindful of renal dysfunction with volume unloading

## 2025-04-07 NOTE — MALNUTRITION/BMI
This medical record reflects one or more clinical indicators suggestive of morbid obesity.    BMI Findings:  Adult BMI Classifications: Morbid Obesity 45-49.9        Body mass index is 46.44 kg/m².     See Nutrition note dated 4/7/25 for additional details.  Completed nutrition assessment is viewable in the nutrition documentation.

## 2025-04-07 NOTE — PLAN OF CARE
Problem: Prexisting or High Potential for Compromised Skin Integrity  Goal: Skin integrity is maintained or improved  Description: INTERVENTIONS:- Identify patients at risk for skin breakdown- Assess and monitor skin integrity- Assess and monitor nutrition and hydration status- Monitor labs - Assess for incontinence - Turn and reposition patient- Assist with mobility/ambulation- Relieve pressure over bony prominences- Avoid friction and shearing- Provide appropriate hygiene as needed including keeping skin clean and dry- Evaluate need for skin moisturizer/barrier cream- Collaborate with interdisciplinary team - Patient/family teaching- Consider wound care consult   Outcome: Progressing     Problem: PAIN - ADULT  Goal: Verbalizes/displays adequate comfort level or baseline comfort level  Description: Interventions:- Encourage patient to monitor pain and request assistance- Assess pain using appropriate pain scale- Administer analgesics based on type and severity of pain and evaluate response- Implement non-pharmacological measures as appropriate and evaluate response- Consider cultural and social influences on pain and pain management- Notify physician/advanced practitioner if interventions unsuccessful or patient reports new pain  Outcome: Progressing     Problem: INFECTION - ADULT  Goal: Absence or prevention of progression during hospitalization  Description: INTERVENTIONS:- Assess and monitor for signs and symptoms of infection- Monitor lab/diagnostic results- Monitor all insertion sites, i.e. indwelling lines, tubes, and drains- Monitor endotracheal if appropriate and nasal secretions for changes in amount and color- [place] appropriate cooling/warming therapies per order- Administer medications as ordered- Instruct and encourage patient and family to use good hand hygiene technique- Identify and instruct in appropriate isolation precautions for identified infection/condition  Outcome: Progressing  Goal:  Absence of fever/infection during neutropenic period  Description: INTERVENTIONS:- Monitor WBC  Outcome: Progressing     Problem: SAFETY ADULT  Goal: Patient will remain free of falls  Description: INTERVENTIONS:- Educate patient/family on patient safety including physical limitations- Instruct patient to call for assistance with activity - Consult OT/PT to assist with strengthening/mobility - Keep Call bell within reach- Keep bed low and locked with side rails adjusted as appropriate- Keep care items and personal belongings within reach- Initiate and maintain comfort rounds- Make Fall Risk Sign visible to staff- Offer Toileting every  Hours, in advance of need- Initiate/Maintain alarm- Obtain necessary fall risk management equipment: - Apply yellow socks and bracelet for high fall risk patients- Consider moving patient to room near nurses station  Outcome: Progressing  Goal: Maintain or return to baseline ADL function  Description: INTERVENTIONS:-  Assess patient's ability to carry out ADLs; assess patient's baseline for ADL function and identify physical deficits which impact ability to perform ADLs (bathing, care of mouth/teeth, toileting, grooming, dressing, etc.)- Assess/evaluate cause of self-care deficits - Assess range of motion- Assess patient's mobility; develop plan if impaired- Assess patient's need for assistive devices and provide as appropriate- Encourage maximum independence but intervene and supervise when necessary- Involve family in performance of ADLs- Assess for home care needs following discharge - Consider OT consult to assist with ADL evaluation and planning for discharge- Provide patient education as appropriate  Outcome: Progressing  Goal: Maintains/Returns to pre admission functional level  Description: INTERVENTIONS:- Perform AM-PAC 6 Click Basic Mobility/ Daily Activity assessment daily.- Set and communicate daily mobility goal to care team and patient/family/caregiver. - Collaborate  with rehabilitation services on mobility goals if consulted- Perform Range of Motion  times a day.- Reposition patient every  hours.- Dangle patient  times a day- Stand patient  times a day- Ambulate patient  times a day- Out of bed to chair  times a day - Out of bed for meals  times a day- Out of bed for toileting- Record patient progress and toleration of activity level   Outcome: Progressing     Problem: DISCHARGE PLANNING  Goal: Discharge to home or other facility with appropriate resources  Description: INTERVENTIONS:- Identify barriers to discharge w/patient and caregiver- Arrange for needed discharge resources and transportation as appropriate- Identify discharge learning needs (meds, wound care, etc.)- Arrange for interpretive services to assist at discharge as needed- Refer to Case Management Department for coordinating discharge planning if the patient needs post-hospital services based on physician/advanced practitioner order or complex needs related to functional status, cognitive ability, or social support system  Outcome: Progressing     Problem: Knowledge Deficit  Goal: Patient/family/caregiver demonstrates understanding of disease process, treatment plan, medications, and discharge instructions  Description: Complete learning assessment and assess knowledge base.Interventions:- Provide teaching at level of understanding- Provide teaching via preferred learning methods  Outcome: Progressing     Problem: RESPIRATORY - ADULT  Goal: Achieves optimal ventilation and oxygenation  Description: INTERVENTIONS:- Assess for changes in respiratory status- Assess for changes in mentation and behavior- Position to facilitate oxygenation and minimize respiratory effort- Oxygen administered by appropriate delivery if ordered- Initiate smoking cessation education as indicated- Encourage broncho-pulmonary hygiene including cough, deep breathe, Incentive Spirometry- Assess the need for suctioning and aspirate as  needed- Assess and instruct to report SOB or any respiratory difficulty- Respiratory Therapy support as indicated  Outcome: Progressing

## 2025-04-08 VITALS
HEART RATE: 74 BPM | WEIGHT: 215.17 LBS | RESPIRATION RATE: 18 BRPM | HEIGHT: 55 IN | SYSTOLIC BLOOD PRESSURE: 123 MMHG | TEMPERATURE: 98.2 F | DIASTOLIC BLOOD PRESSURE: 82 MMHG | BODY MASS INDEX: 49.8 KG/M2 | OXYGEN SATURATION: 89 %

## 2025-04-08 LAB
ANION GAP SERPL CALCULATED.3IONS-SCNC: 5 MMOL/L (ref 4–13)
BNP SERPL-MCNC: 124 PG/ML (ref 0–100)
BUN SERPL-MCNC: 43 MG/DL (ref 5–25)
CALCIUM SERPL-MCNC: 8.7 MG/DL (ref 8.4–10.2)
CHLORIDE SERPL-SCNC: 102 MMOL/L (ref 96–108)
CO2 SERPL-SCNC: 31 MMOL/L (ref 21–32)
CREAT SERPL-MCNC: 1.4 MG/DL (ref 0.6–1.3)
CRP SERPL QL: 15.8 MG/L
ERYTHROCYTE [DISTWIDTH] IN BLOOD BY AUTOMATED COUNT: 13.7 % (ref 11.6–15.1)
GFR SERPL CREATININE-BSD FRML MDRD: 33 ML/MIN/1.73SQ M
GLUCOSE SERPL-MCNC: 97 MG/DL (ref 65–140)
HCT VFR BLD AUTO: 42.5 % (ref 34.8–46.1)
HGB BLD-MCNC: 14 G/DL (ref 11.5–15.4)
MAGNESIUM SERPL-MCNC: 2.1 MG/DL (ref 1.9–2.7)
MCH RBC QN AUTO: 32.9 PG (ref 26.8–34.3)
MCHC RBC AUTO-ENTMCNC: 32.9 G/DL (ref 31.4–37.4)
MCV RBC AUTO: 100 FL (ref 82–98)
PLATELET # BLD AUTO: 171 THOUSANDS/UL (ref 149–390)
PMV BLD AUTO: 8.8 FL (ref 8.9–12.7)
POTASSIUM SERPL-SCNC: 4 MMOL/L (ref 3.5–5.3)
RBC # BLD AUTO: 4.26 MILLION/UL (ref 3.81–5.12)
SODIUM SERPL-SCNC: 138 MMOL/L (ref 135–147)
WBC # BLD AUTO: 7.89 THOUSAND/UL (ref 4.31–10.16)

## 2025-04-08 PROCEDURE — 99223 1ST HOSP IP/OBS HIGH 75: CPT | Performed by: INTERNAL MEDICINE

## 2025-04-08 PROCEDURE — 97530 THERAPEUTIC ACTIVITIES: CPT

## 2025-04-08 PROCEDURE — 99239 HOSP IP/OBS DSCHRG MGMT >30: CPT | Performed by: STUDENT IN AN ORGANIZED HEALTH CARE EDUCATION/TRAINING PROGRAM

## 2025-04-08 PROCEDURE — 97110 THERAPEUTIC EXERCISES: CPT

## 2025-04-08 PROCEDURE — 86140 C-REACTIVE PROTEIN: CPT | Performed by: INTERNAL MEDICINE

## 2025-04-08 PROCEDURE — 97116 GAIT TRAINING THERAPY: CPT

## 2025-04-08 PROCEDURE — 80048 BASIC METABOLIC PNL TOTAL CA: CPT | Performed by: FAMILY MEDICINE

## 2025-04-08 PROCEDURE — 83880 ASSAY OF NATRIURETIC PEPTIDE: CPT | Performed by: INTERNAL MEDICINE

## 2025-04-08 PROCEDURE — 83735 ASSAY OF MAGNESIUM: CPT | Performed by: FAMILY MEDICINE

## 2025-04-08 PROCEDURE — 97535 SELF CARE MNGMENT TRAINING: CPT

## 2025-04-08 PROCEDURE — 85027 COMPLETE CBC AUTOMATED: CPT | Performed by: FAMILY MEDICINE

## 2025-04-08 RX ORDER — NYSTATIN 100000 [USP'U]/G
POWDER TOPICAL 2 TIMES DAILY
Start: 2025-04-08

## 2025-04-08 RX ORDER — IPRATROPIUM BROMIDE AND ALBUTEROL SULFATE 2.5; .5 MG/3ML; MG/3ML
3 SOLUTION RESPIRATORY (INHALATION) EVERY 6 HOURS PRN
Start: 2025-04-08

## 2025-04-08 RX ORDER — BENZONATATE 100 MG/1
100 CAPSULE ORAL 3 TIMES DAILY PRN
Start: 2025-04-08

## 2025-04-08 RX ORDER — GUAIFENESIN/DEXTROMETHORPHAN 100-10MG/5
10 SYRUP ORAL EVERY 4 HOURS PRN
Start: 2025-04-08

## 2025-04-08 RX ORDER — PREDNISONE 20 MG/1
40 TABLET ORAL DAILY
Start: 2025-04-09 | End: 2025-04-13

## 2025-04-08 RX ORDER — PREDNISONE 20 MG/1
40 TABLET ORAL DAILY
Status: DISCONTINUED | OUTPATIENT
Start: 2025-04-08 | End: 2025-04-08 | Stop reason: HOSPADM

## 2025-04-08 RX ADMIN — PREDNISONE 40 MG: 20 TABLET ORAL at 17:26

## 2025-04-08 RX ADMIN — APIXABAN 2.5 MG: 2.5 TABLET, FILM COATED ORAL at 17:26

## 2025-04-08 RX ADMIN — GUAIFENESIN AND DEXTROMETHORPHAN 10 ML: 20; 200 SYRUP ORAL at 10:09

## 2025-04-08 RX ADMIN — METHOCARBAMOL 500 MG: 500 TABLET ORAL at 10:09

## 2025-04-08 RX ADMIN — DORZOLAMIDE HYDROCHLORIDE AND TIMOLOL MALEATE 1 DROP: 20; 5 SOLUTION OPHTHALMIC at 09:53

## 2025-04-08 RX ADMIN — NYSTATIN 1 APPLICATION: 100000 POWDER TOPICAL at 17:26

## 2025-04-08 RX ADMIN — APIXABAN 2.5 MG: 2.5 TABLET, FILM COATED ORAL at 09:53

## 2025-04-08 RX ADMIN — DORZOLAMIDE HYDROCHLORIDE AND TIMOLOL MALEATE 1 DROP: 20; 5 SOLUTION OPHTHALMIC at 17:26

## 2025-04-08 RX ADMIN — NYSTATIN 1 APPLICATION: 100000 POWDER TOPICAL at 09:53

## 2025-04-08 NOTE — OCCUPATIONAL THERAPY NOTE
Occupational Therapy Progress Note     Patient Name: Fang Victor  Today's Date: 4/8/2025  Problem List  Active Problems:    Acute respiratory failure with hypoxia (HCC)    Pulmonary fibrosis (HCC)    Pulmonary edema    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    Stage 3b chronic kidney disease (HCC)       04/08/25 0911   Note Type   Note Type Treatment for insurance authorization   Pain Assessment   Pain Assessment Tool 0-10   Pain Score 2   Pain Location/Orientation Orientation: Lower;Location: Back   Pain Onset/Description Descriptor: Discomfort   Restrictions/Precautions   Other Precautions Chair Alarm;Bed Alarm;Fall Risk;Pain;Visual impairment   ADL   Eating Assistance 7  Independent   Grooming Assistance 4  Minimal Assistance   Grooming Deficit Supervision/safety;Increased time to complete;Verbal cueing   UB Bathing Assistance 4  Minimal Assistance   UB Bathing Deficit Verbal cueing;Supervision/safety;Increased time to complete   LB Bathing Assistance 3  Moderate Assistance   LB Bathing Deficit Right lower leg including foot;Left lower leg including foot   UB Dressing Assistance 4  Minimal Assistance   UB Dressing Deficit Verbal cueing;Supervision/safety;Increased time to complete   LB Dressing Assistance 1  Total Assistance   LB Dressing Deficit Don/doff R sock;Don/doff L sock   LB Dressing Comments Pt requiring assistance to doff/don   Bed Mobility   Additional Comments Not assessed, pt received sitting OOB in recliner chair upon start of session.   Transfers   Sit to Stand 4  Minimal assistance   Additional items Assist x 1;Increased time required;Verbal cues   Stand to Sit 4  Minimal assistance   Additional items Assist x 1;Increased time required;Verbal cues   Stand pivot 4  Minimal assistance   Additional items Assist x 1;Increased time required;Verbal cues   Additional Comments Using RW   Functional Mobility   Functional Mobility 4  Minimal assistance   Additional Comments Pt  participated in short functional household distance in room with min assist x 1 and use of RW.   Additional items Rolling walker   Cognition   Overall Cognitive Status WFL   Arousal/Participation Alert;Cooperative   Attention Within functional limits   Orientation Level Oriented X4   Memory Within functional limits   Following Commands Follows one step commands without difficulty   Activity Tolerance   Activity Tolerance Patient limited by fatigue   Medical Staff Made Aware PCA, [place]   Assessment   Assessment Pt tolerated treatment well. She continues to be limited by fatigue, but is motivated to continue to return to her PLOF. Notable POLANCO with activity however spO2 remains >92% while on RA. Overall, pt currently completing LB ADLs with maximal/total assistance, UB ADLs with minimal assistance, and functional mobility with RW and minimal assistance. Continue to recommend Level II (Moderate Resource Intensity) considering pt previously independent with functional mobility in her room at [facility].   Plan   Treatment Interventions ADL retraining;Functional transfer training;UE strengthening/ROM;Endurance training;Patient/family training;Equipment evaluation/education;Compensatory technique education;Continued evaluation;Energy conservation;Activityengagement   Goal Expiration Date 04/19/25   OT Treatment Day 2   OT Frequency 2-3x/wk   Discharge Recommendation   Rehab Resource Intensity Level, OT II (Moderate Resource Intensity)   AM-PAC Daily Activity Inpatient   Lower Body Dressing 1   Bathing 2   Toileting 2   Upper Body Dressing 3   Grooming 3   Eating 4   Daily Activity Raw Score 15   Daily Activity Standardized Score (Calc for Raw Score >=11) 34.69   AM-PAC Applied Cognition Inpatient   Following a Speech/Presentation 4   Understanding Ordinary Conversation 4   Taking Medications 4   Remembering Where Things Are Placed or Put Away 4   Remembering List of 4-5 Errands 3   Taking Care of Complicated Tasks 3   Applied  Cognition Raw Score 22   Applied Cognition Standardized Score 47.83   End of Consult   Patient Position at End of Consult Bedside chair;Bed/Chair alarm activated;All needs within reach       The patient's raw score on the AM-PAC Daily Activity Inpatient Short Form is 15. A raw score of less than 19 suggests the patient may benefit from discharge to post-acute rehabilitation services. Please refer to the recommendation of the Occupational Therapist for safe discharge planning.    Pt goals to be met by 4/19/2025:     Pt will demonstrate ability to complete grooming/hygiene tasks @ independent after set-up.  Pt will demonstrate ability to complete supine<>sit @ modified independent in order to increase safety and independence during ADL tasks.  Pt will demonstrate ability to complete UB ADLs including washing/dressing @ independent in order to increase performance and participation during meaningful tasks  Pt will demonstrate ability to complete LB dressing @ modified independent in order to increase safety and independence during meaningful tasks.   Pt will demonstrate ability to complete toileting tasks including CM and pericare @ modified independent in order to increase safety and independence during meaningful tasks.  Pt will demonstrate ability to complete EOB, chair, toilet/commode transfers @ modified independent in order to increase performance and participation during functional tasks.  Pt will demonstrate ability to stand for 3 minutes while maintaining fair+ balance with use of a grab bar for UB support PRN.  Pt will demonstrate ability to tolerate 30-35 minute OT session with no vc'ing for deep breathing or use of energy conservation techniques in order to increase activity tolerance during functional tasks.   Pt will demonstrate Good carryover of use of energy conservation/compensatory strategies during ADLs and functional tasks in order to increase safety and reduce risk for falls.   Pt will demonstrate  Good attention and participation in continued evaluation of functional ambulation house hold distances in order to assist with safe d/c planning.  Pt will attend to continued cognitive assessments 100% of the time in order to provide most appropriate d/c recommendations.   Pt will follow 100% simple 2-step commands and be A&O x4 consistently with environmental cues to increase participation in functional activities.   Pt will identify 3 areas of interest/hobbies and 1 intervention on how to incorporate into daily life in order to increase interaction with environment and peers as well as increase participation in meaningful tasks.   Pt will demonstrate 100% carryover of BUE HEP in order to increase BUE MS and increase performance during functional tasks upon d/c home.      Terrence Castillo MS, OTR/L

## 2025-04-08 NOTE — PLAN OF CARE
Problem: Prexisting or High Potential for Compromised Skin Integrity  Goal: Skin integrity is maintained or improved  Description: INTERVENTIONS:- Identify patients at risk for skin breakdown- Assess and monitor skin integrity- Assess and monitor nutrition and hydration status- Monitor labs - Assess for incontinence - Turn and reposition patient- Assist with mobility/ambulation- Relieve pressure over bony prominences- Avoid friction and shearing- Provide appropriate hygiene as needed including keeping skin clean and dry- Evaluate need for skin moisturizer/barrier cream- Collaborate with interdisciplinary team - Patient/family teaching- Consider wound care consult   Outcome: Progressing     Problem: PAIN - ADULT  Goal: Verbalizes/displays adequate comfort level or baseline comfort level  Description: Interventions:- Encourage patient to monitor pain and request assistance- Assess pain using appropriate pain scale- Administer analgesics based on type and severity of pain and evaluate response- Implement non-pharmacological measures as appropriate and evaluate response- Consider cultural and social influences on pain and pain management- Notify physician/advanced practitioner if interventions unsuccessful or patient reports new pain  Outcome: Progressing     Problem: INFECTION - ADULT  Goal: Absence or prevention of progression during hospitalization  Description: INTERVENTIONS:- Assess and monitor for signs and symptoms of infection- Monitor lab/diagnostic results- Monitor all insertion sites, i.e. indwelling lines, tubes, and drains- Monitor endotracheal if appropriate and nasal secretions for changes in amount and color- Jackson appropriate cooling/warming therapies per order- Administer medications as ordered- Instruct and encourage patient and family to use good hand hygiene technique- Identify and instruct in appropriate isolation precautions for identified infection/condition  Outcome: Progressing  Goal:  Absence of fever/infection during neutropenic period  Description: INTERVENTIONS:- Monitor WBC  Outcome: Progressing     Problem: SAFETY ADULT  Goal: Patient will remain free of falls  Description: INTERVENTIONS:- Educate patient/family on patient safety including physical limitations- Instruct patient to call for assistance with activity - Consult OT/PT to assist with strengthening/mobility - Keep Call bell within reach- Keep bed low and locked with side rails adjusted as appropriate- Keep care items and personal belongings within reach- Initiate and maintain comfort rounds- Make Fall Risk Sign visible to staff- Offer Toileting every  Hours, in advance of need- Initiate/Maintain alarm- Obtain necessary fall risk management equipment:- Apply yellow socks and bracelet for high fall risk patients- Consider moving patient to room near nurses station  Outcome: Progressing  Goal: Maintain or return to baseline ADL function  Description: INTERVENTIONS:-  Assess patient's ability to carry out ADLs; assess patient's baseline for ADL function and identify physical deficits which impact ability to perform ADLs (bathing, care of mouth/teeth, toileting, grooming, dressing, etc.)- Assess/evaluate cause of self-care deficits - Assess range of motion- Assess patient's mobility; develop plan if impaired- Assess patient's need for assistive devices and provide as appropriate- Encourage maximum independence but intervene and supervise when necessary- Involve family in performance of ADLs- Assess for home care needs following discharge - Consider OT consult to assist with ADL evaluation and planning for discharge- Provide patient education as appropriate  Outcome: Progressing  Goal: Maintains/Returns to pre admission functional level  Description: INTERVENTIONS:- Perform AM-PAC 6 Click Basic Mobility/ Daily Activity assessment daily.- Set and communicate daily mobility goal to care team and patient/family/caregiver. - Collaborate with  rehabilitation services on mobility goals if consulted- Perform Range of Motion  times a day.- Reposition patient every  hours.- Dangle patient  times a day- Stand patient  times a day- Ambulate patient  times a day- Out of bed to chair  times a day - Out of bed for meals times a day- Out of bed for toileting- Record patient progress and toleration of activity level   Outcome: Progressing     Problem: DISCHARGE PLANNING  Goal: Discharge to home or other facility with appropriate resources  Description: INTERVENTIONS:- Identify barriers to discharge w/patient and caregiver- Arrange for needed discharge resources and transportation as appropriate- Identify discharge learning needs (meds, wound care, etc.)- Arrange for interpretive services to assist at discharge as needed- Refer to Case Management Department for coordinating discharge planning if the patient needs post-hospital services based on physician/advanced practitioner order or complex needs related to functional status, cognitive ability, or social support system  Outcome: Progressing     Problem: Knowledge Deficit  Goal: Patient/family/caregiver demonstrates understanding of disease process, treatment plan, medications, and discharge instructions  Description: Complete learning assessment and assess knowledge base.Interventions:- Provide teaching at level of understanding- Provide teaching via preferred learning methods  Outcome: Progressing     Problem: RESPIRATORY - ADULT  Goal: Achieves optimal ventilation and oxygenation  Description: INTERVENTIONS:- Assess for changes in respiratory status- Assess for changes in mentation and behavior- Position to facilitate oxygenation and minimize respiratory effort- Oxygen administered by appropriate delivery if ordered- Initiate smoking cessation education as indicated- Encourage broncho-pulmonary hygiene including cough, deep breathe, Incentive Spirometry- Assess the need for suctioning and aspirate as needed-  Assess and instruct to report SOB or any respiratory difficulty- Respiratory Therapy support as indicated  Outcome: Progressing

## 2025-04-08 NOTE — ASSESSMENT & PLAN NOTE
"Patient presented from SNF  (4/4) with reports of shortness of breath and cough  Patient denied any symptoms and states that she feels \"like she always does\"  Apparently, per [facility] report, transient desaturations prompting referral to emergency department    In ED, without SIRS criteria, hemodynamically stable, with apparent desaturation to 88% requiring initiation of 2 L nasal cannula  Subsequent evaluation included plain film chest x-ray which was suggestive of mild pulmonary edema  Patient carries history of pulmonary fibrosis but is not oxygen dependent at baseline  COVID, flu, RSV negative  Received antibiotics in the ED    Index suspicion for transient hypoxia as the result of mild pulmonary edema versus persistent/progressive pulmonary fibrosis  COVID, flu, RSV negative  Low index of suspicion for active infection/pneumonia given absence of SIRS criteria and lack of discrete infiltrates on plain film  CT scan (4/4) as follows:  Stable pulmonary fibrosis and reticular changes/scarring. No acute consolidation.   See plans below  Wean oxygen to off    (4/6) liberated from supplemental oxygen at rest.  Mild desaturation with activity to 86 to 87%  Additional dose of diuretics (4/6)  Overnight (4/6-4/7) transient desaturations overnight prompting reinitiation of 2 L nasal cannula  This is likely progression of underlying pulmonary fibrosis  Pulmonology consulted: Recommend prednisone 40 mg x 5 days for possible ILD flare/acute bronchitis.  Patient should follow-up with pulmonology in the outpatient setting  Patient was weaned to room air prior to discharge and tolerating well  "

## 2025-04-08 NOTE — PLAN OF CARE
Problem: OCCUPATIONAL THERAPY ADULT  Goal: Performs self-care activities at highest level of function for planned discharge setting.  See evaluation for individualized goals.  Description: Treatment Interventions: ADL retraining, Visual perceptual retraining, Functional transfer training, UE strengthening/ROM, Endurance training, Patient/family training, Equipment evaluation/education, Compensatory technique education, Energy conservation, Activityengagement          See flowsheet documentation for full assessment, interventions and recommendations.   Outcome: Progressing  Note: Limitation: Decreased ADL status, Decreased UE ROM, Decreased UE strength, Decreased Safe judgement during ADL, Decreased endurance, Decreased self-care trans, Decreased high-level ADLs  Prognosis: Good  Assessment: Pt tolerated treatment well. She continues to be limited by fatigue, but is motivated to continue to return to her PLOF. Notable POLANCO with activity however spO2 remains >92% while on RA. Overall, pt currently completing LB ADLs with maximal/total assistance, UB ADLs with minimal assistance, and functional mobility with RW and minimal assistance. Continue to recommend Level II (Moderate Resource Intensity) considering pt previously independent with functional mobility in her room at [facility].     Rehab Resource Intensity Level, OT: II (Moderate Resource Intensity)

## 2025-04-08 NOTE — ASSESSMENT & PLAN NOTE
Lab Results   Component Value Date    EGFR 33 04/08/2025    EGFR 34 04/07/2025    EGFR 30 04/06/2025    CREATININE 1.40 (H) 04/08/2025    CREATININE 1.38 (H) 04/07/2025    CREATININE 1.53 (H) 04/06/2025   Presents at renal baseline  Avoid nephrotoxic agents and hypotension  Mindful of renal dysfunction with volume unloading

## 2025-04-08 NOTE — CASE MANAGEMENT
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 2025     Stage 3b chronic kidney disease (HCC) 2025     Acute kidney injury superimposed on chronic kidney disease  (HCC) 2024     DVT (deep venous thrombosis) (HCC) 2024     ILD (interstitial lung disease) (HCC) 2024     HTN (hypertension) 2024     Lymphedema 2024       LOS (days): 3  Geometric Mean LOS (GMLOS) (days): 3.5  Days to GMLOS:0.4     OBJECTIVE:  Risk of Unplanned Readmission Score: 12         Current admission status: Inpatient   Preferred Pharmacy:   PATIENT/FAMILY REPORTS NO PREFERRED PHARMACY  No address on file      Primary Care Provider: Simba Jameson MD    Primary Insurance: [facility] REP  Secondary Insurance:     DISCHARGE DETAILS:                             Treatment Team Recommendation: Short Term Rehab  Discharge Destination Plan:: Short Term Rehab  Transport at Discharge : Wheelchair van                             IMM Given (Date):: 25  IMM Given to:: Patient  Family notified:: appeal rights provided to patient                 CM met with patient to let her know auth received for [facility] STR.    CM spoke to patient at the bedside, reviewed DC IMM with patient and informed that patient can stay an additional 4 hours for reconsidering appealing the discharge as the medicare rights were review on the day of discharge. Pt verbalized understanding and feels ready to go STR and does not intend to stay 4 hours to reconsider. IMM  placed in bin for filing.      CM submitted Roundtrip referral for WC transfer to [facility].    CM cancelled [facility] referral in [facility]IN as patient will discharge to [facility].    CM called [facility] and spoke with Valentine to let her know patient will discharge to [facility].     CM called sister, Yazmin, to let her know patient will discharge to [facility] today.

## 2025-04-08 NOTE — PHYSICAL THERAPY NOTE
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   Cognition   Orientation Level Oriented X4   Following Commands Follows one step commands without difficulty   Subjective   Subjective \"I was walking around my room with the walker, but I don't wlk in the hallway without help and a wheelchair follow.\"   Bed Mobility   Supine to Sit 5  Supervision   Additional items HOB elevated;Increased time required   Additional Comments HOB elevated > 30 degrees. inc time to complete.   Transfers   Sit to Stand 4  Minimal assistance   Additional items Assist x 1;Increased time required;Verbal cues   Stand to Sit 4  Minimal assistance   Additional items Assist x 1;Increased time required;Verbal cues   Stand pivot 4  Minimal assistance   Additional items Assist x 1;Increased time required;Verbal cues   Toilet transfer 4  Minimal assistance   Additional items Assist x 1;Increased time required;Verbal cues;Standard toilet   Additional Comments RW. inc time and effort to achieve standing. minAx1 with cues for upright posture. spt with minAx1 with manual cues for wt shifting and verbal cues for tunring completely with RW prior to sitting as patient attempted to push RW aside. sit<>stand from toilet with miNAx1. static standing balance with B UE support with close supervision. standing dynamic balance for " "hand hygiene with UE support with minAx1.   Ambulation/Elevation   Gait pattern Improper Weight shift;Wide EMILIE;Forward Flexion;Decreased foot clearance;Short stride;Excessively slow;Decreased hip extension;Decreased heel strike;Decreased toe off   Gait Assistance 4  Minimal assist   Additional items Assist x 1;Verbal cues   Assistive Device Rolling walker   Distance ambulated 14'x1, 6'x1  and 20'x1 with RW with minAx1 with manual cues for wt shfiting and verbal cues for upright posture and staying within EMILIE of RW. pt with standard RW, therapist provided nathaniel RW for improved height and UE use with mobility. mod POLANCO. sitting rst break needed between ambulation trials.   Balance   Static Sitting Fair +   Dynamic Sitting Fair   Static Standing Fair -   Dynamic Standing Poor +   Ambulatory Poor +   Endurance Deficit   Endurance Deficit Yes   Endurance Deficit Description fatigue, mod POLANCO. SpO2 at rest 95% on room air, with activity, 88-90%. inaccurate pleth, recovers to 91-93% within 2'.   Activity Tolerance   Activity Tolerance Patient limited by fatigue;Patient limited by pain   Medical Staff Made Aware Tash PATEL   Exercises   Hip Flexion Sitting;15 reps;AROM;Bilateral   Knee AROM Long Arc Quad Sitting;15 reps;AROM;Bilateral   Ankle Pumps Sitting;15 reps;AROM;Bilateral   Assessment   Prognosis Fair   Problem List Decreased strength;Decreased range of motion;Impaired balance;Decreased endurance;Decreased mobility;Decreased safety awareness;Obesity;Pain   Barriers to Discharge Decreased caregiver support;Inaccessible home environment   Barriers to Discharge Comments requires assistance to complete mobility, fall risk, decreased endurance   Goals   Patient Goals \"to walk.\"   Plan   Treatment/Interventions ADL retraining;Functional transfer training;Endurance training;Elevations;LE strengthening/ROM;Therapeutic exercise;Bed mobility;Gait training   Progress Slow progress, decreased activity tolerance   PT Frequency " 3-5x/wk   Discharge Recommendation   Rehab Resource Intensity Level, PT II (Moderate Resource Intensity)   AM-PAC Basic Mobility Inpatient   Turning in Flat Bed Without Bedrails 3   Lying on Back to Sitting on Edge of Flat Bed Without Bedrails 2   Moving Bed to Chair 3   Standing Up From Chair Using Arms 3   Walk in Room 3   Climb 3-5 Stairs With Railing 1   Basic Mobility Inpatient Raw Score 15   Basic Mobility Standardized Score 36.97   [facility] Highest Level Of Mobility   -[facility] Goal 4: Move to chair/commode   -HL Achieved 7: Walk 25 feet or more   Education   Education Provided Mobility training;Home exercise program;Assistive device   Patient Demonstrates verbal understanding   End of Consult   Patient Position at End of Consult Bedside chair;Bed/Chair alarm activated;All needs within reach         Treatment time: 8784-2641    The patient's AM-PAC Basic Mobility Inpatient Short Form Raw Score is 15. A Raw score of less than or equal to 16 suggests the patient may benefit from discharge to post-acute rehabilitation services. Please also refer to the recommendation of the Physical Therapist for safe discharge planning.    Pt tolerated session fairly. She was able to complete transfers and ambulation with decreased assistance this date, but fatigues quickly and requires increased rest and time between ambulation trials and LE TE. She requires min cues for proper completion of LE TE for strengthening. She is limited by decreased endurance, balance, strength and c/o back pain. She reports she was self transferring to/from wheelchair and ambulating short distances in room prior to admission. She will benefit from continued PT services to maximize LOF. Resource intensity level II at this time to facilitate return to OF given current need for assistance, decreased endurance. Pt is motivated to improve mobility and verbalizes agreement in PAR prior to return to [facility].    Maru Aponte, PT,DPT

## 2025-04-08 NOTE — PLAN OF CARE
Problem: Prexisting or High Potential for Compromised Skin Integrity  Goal: Skin integrity is maintained or improved  Description: INTERVENTIONS:- Identify patients at risk for skin breakdown- Assess and monitor skin integrity- Assess and monitor nutrition and hydration status- Monitor labs - Assess for incontinence - Turn and reposition patient- Assist with mobility/ambulation- Relieve pressure over bony prominences- Avoid friction and shearing- Provide appropriate hygiene as needed including keeping skin clean and dry- Evaluate need for skin moisturizer/barrier cream- Collaborate with interdisciplinary team - Patient/family teaching- Consider wound care consult   Outcome: Adequate for Discharge     Problem: PAIN - ADULT  Goal: Verbalizes/displays adequate comfort level or baseline comfort level  Description: Interventions:- Encourage patient to monitor pain and request assistance- Assess pain using appropriate pain scale- Administer analgesics based on type and severity of pain and evaluate response- Implement non-pharmacological measures as appropriate and evaluate response- Consider cultural and social influences on pain and pain management- Notify physician/advanced practitioner if interventions unsuccessful or patient reports new pain  Outcome: Adequate for Discharge     Problem: INFECTION - ADULT  Goal: Absence or prevention of progression during hospitalization  Description: INTERVENTIONS:- Assess and monitor for signs and symptoms of infection- Monitor lab/diagnostic results- Monitor all insertion sites, i.e. indwelling lines, tubes, and drains- Monitor endotracheal if appropriate and nasal secretions for changes in amount and color- Leivasy appropriate cooling/warming therapies per order- Administer medications as ordered- Instruct and encourage patient and family to use good hand hygiene technique- Identify and instruct in appropriate isolation precautions for identified infection/condition  Outcome:  Adequate for Discharge  Goal: Absence of fever/infection during neutropenic period  Description: INTERVENTIONS:- Monitor WBC  Outcome: Adequate for Discharge     Goal: Maintain or return to baseline ADL function  Description: INTERVENTIONS:-  Assess patient's ability to carry out ADLs; assess patient's baseline for ADL function and identify physical deficits which impact ability to perform ADLs (bathing, care of mouth/teeth, toileting, grooming, dressing, etc.)- Assess/evaluate cause of self-care deficits - Assess range of motion- Assess patient's mobility; develop plan if impaired- Assess patient's need for assistive devices and provide as appropriate- Encourage maximum independence but intervene and supervise when necessary- Involve family in performance of ADLs- Assess for home care needs following discharge - Consider OT consult to assist with ADL evaluation and planning for discharge- Provide patient education as appropriate  Outcome: Adequate for Discharge     Problem: DISCHARGE PLANNING  Goal: Discharge to home or other facility with appropriate resources  Description: INTERVENTIONS:- Identify barriers to discharge w/patient and caregiver- Arrange for needed discharge resources and transportation as appropriate- Identify discharge learning needs (meds, wound care, etc.)- Arrange for interpretive services to assist at discharge as needed- Refer to Case Management Department for coordinating discharge planning if the patient needs post-hospital services based on physician/advanced practitioner order or complex needs related to functional status, cognitive ability, or social support system  Outcome: Adequate for Discharge     Problem: Knowledge Deficit  Goal: Patient/family/caregiver demonstrates understanding of disease process, treatment plan, medications, and discharge instructions  Description: Complete learning assessment and assess knowledge base.Interventions:- Provide teaching at level of understanding-  Provide teaching via preferred learning methods  Outcome: Adequate for Discharge     Problem: RESPIRATORY - ADULT  Goal: Achieves optimal ventilation and oxygenation  Description: INTERVENTIONS:- Assess for changes in respiratory status- Assess for changes in mentation and behavior- Position to facilitate oxygenation and minimize respiratory effort- Oxygen administered by appropriate delivery if ordered- Initiate smoking cessation education as indicated- Encourage broncho-pulmonary hygiene including cough, deep breathe, Incentive Spirometry- Assess the need for suctioning and aspirate as needed- Assess and instruct to report SOB or any respiratory difficulty- Respiratory Therapy support as indicated  Outcome: Adequate for Discharge     Problem: PHYSICAL THERAPY ADULT  Goal: Performs mobility at highest level of function for planned discharge setting.  See evaluation for individualized goals.  Description: Treatment/Interventions: ADL retraining, Functional transfer training, Endurance training, Elevations, LE strengthening/ROM, Therapeutic exercise, Bed mobility, Gait training          See flowsheet documentation for full assessment, interventions and recommendations.  Outcome: Adequate for Discharge     Problem: OCCUPATIONAL THERAPY ADULT  Goal: Performs self-care activities at highest level of function for planned discharge setting.  See evaluation for individualized goals.  Description: Treatment Interventions: ADL retraining, Visual perceptual retraining, Functional transfer training, UE strengthening/ROM, Endurance training, Patient/family training, Equipment evaluation/education, Compensatory technique education, Energy conservation, Activityengagement          See flowsheet documentation for full assessment, interventions and recommendations.   Outcome: Adequate for Discharge

## 2025-04-08 NOTE — DISCHARGE SUMMARY
"Discharge Summary - Hospitalist   Name: Fang Victor 89 y.o. female I MRN: [id number]  Unit/Bed#: -01 I Date of Admission: 4/4/2025   Date of Service: 4/8/2025 I Hospital Day: 3     Assessment & Plan  Acute respiratory failure with hypoxia (HCC)  Patient presented from SNF  (4/4) with reports of shortness of breath and cough  Patient denied any symptoms and states that she feels \"like she always does\"  Apparently, per SNF report, transient desaturations prompting referral to emergency department    In ED, without SIRS criteria, hemodynamically stable, with apparent desaturation to 88% requiring initiation of 2 L nasal cannula  Subsequent evaluation included plain film chest x-ray which was suggestive of mild pulmonary edema  Patient carries history of pulmonary fibrosis but is not oxygen dependent at baseline  COVID, flu, RSV negative  Received antibiotics in the ED    Index suspicion for transient hypoxia as the result of mild pulmonary edema versus persistent/progressive pulmonary fibrosis  COVID, flu, RSV negative  Low index of suspicion for active infection/pneumonia given absence of SIRS criteria and lack of discrete infiltrates on plain film  CT scan (4/4) as follows:  Stable pulmonary fibrosis and reticular changes/scarring. No acute consolidation.   See plans below  Wean oxygen to off    (4/6) liberated from supplemental oxygen at rest.  Mild desaturation with activity to 86 to 87%  Additional dose of diuretics (4/6)  Overnight (4/6-4/7) transient desaturations overnight prompting reinitiation of 2 L nasal cannula  This is likely progression of underlying pulmonary fibrosis  Pulmonology consulted: Recommend prednisone 40 mg x 5 days for possible ILD flare/acute bronchitis.  Patient should follow-up with pulmonology in the outpatient setting  Patient was weaned to room air prior to discharge and tolerating well  Pulmonary fibrosis (HCC)  Patient with longstanding history of pulmonary fibrosis  Not " "oxygen dependent at baseline   plain film imaging suggestive of pulmonary fibrosis-doubt infectious infiltrates  It is possible the patient has evolved some degree of chronic hypoxia given advanced underlying findings  Will treat for component of mild pulmonary edema and monitor for improvement  Treated with prednisone 40 mg x 5 days  Pulmonary edema  Patient with evidence of mild pulmonary edema on plain film imaging \"improved to show (4/4)  Have ordered CT chest to better examine lung parenchyma given presentation with transient hypoxia-pulmonary fibrosis with mild pulmonary edema  Continue to assess daily weight/volume status and determine appropriateness of continuing with diuretic therapy intravenously  (4/6) proceeded with additional intravenous diuretic therapy-Lasix IV 40 mg IV x 1  Reports compliance with oral Lasix in the outpatient setting  2D echocardiogram (4/4) preserved ejection fraction without evidence of overt diastolic dysfunction  Continue Lasix as patient was taking prior to admission on discharge.  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  Chronic condition/stable  Continue systemic anticoagulation  Stage 3b chronic kidney disease (HCC)  Lab Results   Component Value Date    EGFR 33 04/08/2025    EGFR 34 04/07/2025    EGFR 30 04/06/2025    CREATININE 1.40 (H) 04/08/2025    CREATININE 1.38 (H) 04/07/2025    CREATININE 1.53 (H) 04/06/2025   Presents at renal baseline  Avoid nephrotoxic agents and hypotension  Mindful of renal dysfunction with volume unloading     Medical Problems       Resolved Problems  Date Reviewed: 1/13/2024   None           Admission Date:   Admission Orders (From admission, onward)       Ordered        04/05/25 1208  INPATIENT ADMISSION  Once            04/04/25 1151  Place in Observation  Once                          Discharge Date: 04/08/25    Consultations During Hospital Stay:  Pulmonology    Procedures Performed:   None    Significant Findings / Test " Results:   CT chest wo contrast   Final Result by Darren Arellano MD (04/04 1726)      Stable pulmonary fibrosis and reticular changes/scarring. No acute consolidation.               Workstation performed: [id number]         XR chest 1 view portable   Final Result by Geovani Law MD (04/04 1053)      Reticular opacities correlating to pulmonary fibrosis.      Mild pulmonary vascular congestion.            Workstation performed: [id number]           TTE:   Left Ventricle: Left ventricular cavity size is normal. Wall thickness is normal. The left ventricular ejection fraction is 75% by visual estimation. Systolic function is hyperdynamic. Wall motion is normal. Diastolic function is abnormal.  Left atrial filling pressure is elevated.    Left Atrium: The atrium is mildly dilated.    Aortic Valve: The leaflets are moderately calcified. There is mild to moderate regurgitation. There is mild stenosis. The aortic valve mean gradient is 14 mmHg. The dimensionless velocity index is 0.40. The aortic valve area is 0.80 cm2. Aortic valve peak velocity is 2.44 m/s. AV mean gradient is 14 mmHg.    Mitral Valve: There is moderate annular calcification. There is mild regurgitation. There is no evidence of stenosis. MV mean gradient antegrade is 4 mmHg.    Tricuspid Valve: There is mild regurgitation. The estimated right ventricular systolic pressure is 50.00 mmHg.    IVC/SVC: The right atrial pressure is estimated at 3.0 mmHg. The inferior vena cava is normal in size. Respirophasic changes were normal.    Incidental Findings:   As above      Test Results Pending at Discharge (will require follow up):   None     Complications: None    Reason for Admission: Dyspnea and hypoxia    Hospital Course:   Fang Victor is a 89 y.o. female patient who originally presented to the hospital on 4/4/2025 due to dyspnea and hypoxia from skilled nursing facility.  On admission patient did not have any acute complaints, however patient  "was found to have O2 saturation in the 80s on room air with associated cough.  In the ED patient did not meet SIRS criteria, hemodynamically stable, noted to be desaturating to 88% on room air.  Patient was started on nasal cannula 2 L with no improvement in O2 saturation.  Chest x-ray showed mild pulmonary edema with pulmonary fibrosis.  Patient received IV antibiotics and IV diuretics in the ED.  Pulmonology was consulted, given elevated CRP recommended treating for possible ILD flare with prednisone 40 mg x 5 days.  Patient improved throughout hospitalization, she was weaned to room air and maintaining O2 saturation prior to discharge.  She was seen and evaluated by PT/OT and recommended for STR, and she was agreeable.  Insurance authorization was obtained and patient was stable for discharge to [facility].  Patient needs close follow-up with pulmonology in the outpatient setting.    Please see above list of diagnoses and related plan for additional information.     Condition at Discharge: stable    Discharge Day Visit / Exam:   Subjective: Patient seen and examined at bedside.  No acute events overnight.  Patient reports improvement in symptoms compared admission.  She is eager to be discharged.  Vitals: Blood Pressure: 123/82 (04/08/25 1551)  Pulse: 74 (04/08/25 1551)  Temperature: 98.2 °F (36.8 °C) (04/08/25 0756)  Temp Source: Temporal (04/07/25 1503)  Respirations: 18 (04/08/25 0756)  Height: 4' 7\" (139.7 cm) (04/08/25 0714)  Weight - Scale: 97.6 kg (215 lb 2.7 oz) (04/08/25 0600)  SpO2: (!) 89 % (04/08/25 1551)  Physical Exam  Vitals and nursing note reviewed.   Constitutional:       General: She is not in acute distress.     Appearance: She is obese. She is not toxic-appearing.   HENT:      Head: Normocephalic and atraumatic.   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   Pulmonary:      Effort: Pulmonary effort is normal.      Breath " sounds: Normal breath sounds.   Abdominal:      Palpations: Abdomen is soft.   Musculoskeletal:         General: Swelling present.      Right lower leg: Edema present.      Left lower leg: Edema present.   Skin:     General: Skin is warm.   Neurological:      General: No focal deficit present.      Mental Status: She is alert and oriented to person, place, and time. Mental status is at baseline.   Psychiatric:         Mood and Affect: Mood normal.         Behavior: Behavior normal.         Discussion with Family: Patient declined call to .     Discharge instructions/Information to patient and family:   See after visit summary for information provided to patient and family.      Provisions for Follow-Up Care:  See after visit summary for information related to follow-up care and any pertinent home health orders.      Mobility at time of Discharge:   Basic Mobility Inpatient Raw Score: 15  JH-HLM Goal: 4: Move to chair/commode  JH-HLM Achieved: 7: Walk 25 feet or more  HLM Goal achieved. Continue to encourage appropriate mobility.     Disposition:   Other Skilled Nursing Facility at [facility]    Planned Readmission: No    Discharge Medications:  See after visit summary for reconciled discharge medications provided to patient and/or family.      Administrative Statements     **Please Note: This note may have been constructed using a voice recognition system**

## 2025-04-08 NOTE — DISCHARGE SUPPORT
Case Management Assessment & Discharge Planning Note    Patient name Fang Victor  Location /-01 MRN [id number]  : 1935 Date 2025       Current Admission Date: 2025  Current Admission Diagnosis:Acute respiratory failure with hypoxia (HCC)   Patient Active Problem List    Diagnosis Date Noted Date Diagnosed    Acute respiratory failure with hypoxia (HCC) 2025     Pulmonary fibrosis (HCC) 2025     Pulmonary edema 2025     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 2025     Stage 3b chronic kidney disease (HCC) 2025     Acute kidney injury superimposed on chronic kidney disease  (HCC) 2024     DVT (deep venous thrombosis) (HCC) 2024     ILD (interstitial lung disease) (HCC) 2024     HTN (hypertension) 2024     Lymphedema 2024       LOS (days): 3  Geometric Mean LOS (GMLOS) (days): 3.5  Days to GMLOS:0.5   Facility Authorization Approved  [facility] received approved auth for: [facility]  Insurance: [facility]  Determination made after peer to peer was completed?: Not applicable  Authorization Obtained Via: Phone  Name/Phone # of Rep who called in determination: Gita LOZOYA#: [phone]  Facility Name: [facility]  Approved Authorization Number: [id number]  Start of Care Date: 25  Next Review Date: 04/10/25 (2 Business Days)  Submit Next Review to: [employer] / F#: [phone]   notified: Neda Hauser     Updates to authorization status will be noted in chart.    Please reach out to CM for updates on any clinical information.

## 2025-04-08 NOTE — ASSESSMENT & PLAN NOTE
Patient with longstanding history of pulmonary fibrosis  Not oxygen dependent at baseline   plain film imaging suggestive of pulmonary fibrosis-doubt infectious infiltrates  It is possible the patient has evolved some degree of chronic hypoxia given advanced underlying findings  Will treat for component of mild pulmonary edema and monitor for improvement  Treated with prednisone 40 mg x 5 days

## 2025-04-08 NOTE — ASSESSMENT & PLAN NOTE
"Patient with evidence of mild pulmonary edema on plain film imaging \"improved to show (4/4)  Have ordered CT chest to better examine lung parenchyma given presentation with transient hypoxia-pulmonary fibrosis with mild pulmonary edema  Continue to assess daily weight/volume status and determine appropriateness of continuing with diuretic therapy intravenously  (4/6) proceeded with additional intravenous diuretic therapy-Lasix IV 40 mg IV x 1  Reports compliance with oral Lasix in the outpatient setting  2D echocardiogram (4/4) preserved ejection fraction without evidence of overt diastolic dysfunction  Continue Lasix as patient was taking prior to admission on discharge.  "

## 2025-04-08 NOTE — DISCHARGE SUPPORT
Case Management Assessment & Discharge Planning Note    Patient name Fang Victor  Location /-01 MRN [id number]  : 1935 Date 2025       Current Admission Date: 2025  Current Admission Diagnosis:Acute respiratory failure with hypoxia (HCC)   Patient Active Problem List    Diagnosis Date Noted Date Diagnosed    Acute respiratory failure with hypoxia (HCC) 2025     Pulmonary fibrosis (HCC) 2025     Pulmonary edema 2025     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 2025     Stage 3b chronic kidney disease (HCC) 2025     Acute kidney injury superimposed on chronic kidney disease  (HCC) 2024     DVT (deep venous thrombosis) (HCC) 2024     ILD (interstitial lung disease) (HCC) 2024     HTN (hypertension) 2024     Lymphedema 2024       LOS (days): 3  Geometric Mean LOS (GMLOS) (days): 3.5  Days to GMLOS:0.6   Facility Authorization Initiated  [facility] received request for auth from : Neda Hauser  Authorization Request Submitted for: SNF  Requested Start of Care Date: 25  Facility Name: [facility]  Facility NPI: [id number]  Facility MD: Haley Rainey  Facility MD NPI: [id number]  Authorization initiated by contacting insurance: [employer]  Via: [employer]  Clinicals submitted via: Portal Attachment  Pending reference #: [id number]   notified: Neda Hauser     Updates to authorization status will be noted in chart.    Please reach out to CM for updates on any clinical information.

## 2025-04-08 NOTE — PLAN OF CARE
Problem: Prexisting or High Potential for Compromised Skin Integrity  Goal: Skin integrity is maintained or improved  Description: INTERVENTIONS:- Identify patients at risk for skin breakdown- Assess and monitor skin integrity- Assess and monitor nutrition and hydration status- Monitor labs - Assess for incontinence - Turn and reposition patient- Assist with mobility/ambulation- Relieve pressure over bony prominences- Avoid friction and shearing- Provide appropriate hygiene as needed including keeping skin clean and dry- Evaluate need for skin moisturizer/barrier cream- Collaborate with interdisciplinary team - Patient/family teaching- Consider wound care consult   Outcome: Progressing     Problem: PAIN - ADULT  Goal: Verbalizes/displays adequate comfort level or baseline comfort level  Description: Interventions:- Encourage patient to monitor pain and request assistance- Assess pain using appropriate pain scale- Administer analgesics based on type and severity of pain and evaluate response- Implement non-pharmacological measures as appropriate and evaluate response- Consider cultural and social influences on pain and pain management- Notify physician/advanced practitioner if interventions unsuccessful or patient reports new pain  Outcome: Progressing     Problem: INFECTION - ADULT  Goal: Absence or prevention of progression during hospitalization  Description: INTERVENTIONS:- Assess and monitor for signs and symptoms of infection- Monitor lab/diagnostic results- Monitor all insertion sites, i.e. indwelling lines, tubes, and drains- Monitor endotracheal if appropriate and nasal secretions for changes in amount and color- [place] appropriate cooling/warming therapies per order- Administer medications as ordered- Instruct and encourage patient and family to use good hand hygiene technique- Identify and instruct in appropriate isolation precautions for identified infection/condition  Outcome: Progressing  Goal:  Absence of fever/infection during neutropenic period  Description: INTERVENTIONS:- Monitor WBC  Outcome: Progressing     Problem: SAFETY ADULT  Goal: Patient will remain free of falls  Description: INTERVENTIONS:- Educate patient/family on patient safety including physical limitations- Instruct patient to call for assistance with activity - Consult OT/PT to assist with strengthening/mobility - Keep Call bell within reach- Keep bed low and locked with side rails adjusted as appropriate- Keep care items and personal belongings within reach- Initiate and maintain comfort rounds- Make Fall Risk Sign visible to staff- Offer Toileting every 2 Hours, in advance of need- Initiate/Maintain bed/chair alarm- Obtain necessary fall risk management equipment: - Apply yellow socks and bracelet for high fall risk patients- Consider moving patient to room near nurses station  Outcome: Progressing  Goal: Maintain or return to baseline ADL function  Description: INTERVENTIONS:-  Assess patient's ability to carry out ADLs; assess patient's baseline for ADL function and identify physical deficits which impact ability to perform ADLs (bathing, care of mouth/teeth, toileting, grooming, dressing, etc.)- Assess/evaluate cause of self-care deficits - Assess range of motion- Assess patient's mobility; develop plan if impaired- Assess patient's need for assistive devices and provide as appropriate- Encourage maximum independence but intervene and supervise when necessary- Involve family in performance of ADLs- Assess for home care needs following discharge - Consider OT consult to assist with ADL evaluation and planning for discharge- Provide patient education as appropriate  Outcome: Progressing  Goal: Maintains/Returns to pre admission functional level  Description: INTERVENTIONS:- Perform AM-PAC 6 Click Basic Mobility/ Daily Activity assessment daily.- Set and communicate daily mobility goal to care team and patient/family/caregiver. -  Collaborate with rehabilitation services on mobility goals if consulted- Perform Range of Motion 2 times a day.- Reposition patient every 2 hours.- Dangle patient 3 times a day- Stand patient 3 times a day- Ambulate patient 3 times a day- Out of bed to chair 3 times a day - Out of bed for meals 3 times a day- Out of bed for toileting- Record patient progress and toleration of activity level   Outcome: Progressing

## 2025-04-08 NOTE — PLAN OF CARE
Problem: PHYSICAL THERAPY ADULT  Goal: Performs mobility at highest level of function for planned discharge setting.  See evaluation for individualized goals.  Description: Treatment/Interventions: ADL retraining, Functional transfer training, Endurance training, Elevations, LE strengthening/ROM, Therapeutic exercise, Bed mobility, Gait training          See flowsheet documentation for full assessment, interventions and recommendations.  4/8/2025 0815 by Maru Aponte, PT  Note: Prognosis: Fair  Problem List: Decreased strength, Decreased range of motion, Impaired balance, Decreased endurance, Decreased mobility, Decreased safety awareness, Obesity, Pain  Assessment: Pt tolerated session fairly. She was able to complete transfers and ambulation with decreased assistance this date, but fatigues quickly and requires increased rest and time between ambulation trials and LE TE. She requires min cues for proper completion of LE TE for strengthening. She is limited by decreased endurance, balance, strength and c/o back pain. She reports she was self transferring to/from wheelchair and ambulating short distances in room prior to admission. She will benefit from continued PT services to maximize LOF. Resource intensity level II at this time to facilitate return to OF given current need for assistance, decreased endurance. Pt is motivated to improve mobility and verbalizes agreement in PAR prior to return to [facility].  Barriers to Discharge: Decreased caregiver support, Inaccessible home environment  Barriers to Discharge Comments: requires assistance to complete mobility, fall risk, decreased endurance  Rehab Resource Intensity Level, PT: II (Moderate Resource Intensity)    See flowsheet documentation for full assessment.

## 2025-04-08 NOTE — CASE MANAGEMENT
Case Management Discharge Planning Note    Patient name Fang Victor  Location /-01 MRN [id number]  : 1935 Date 2025       Current Admission Date: 2025  Current Admission Diagnosis:Acute respiratory failure with hypoxia (HCC)   Patient Active Problem List    Diagnosis Date Noted Date Diagnosed    Acute respiratory failure with hypoxia (HCC) 2025     Pulmonary fibrosis (HCC) 2025     Pulmonary edema 2025     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 2025     Stage 3b chronic kidney disease (HCC) 2025     Acute kidney injury superimposed on chronic kidney disease  (HCC) 2024     DVT (deep venous thrombosis) (HCC) 2024     ILD (interstitial lung disease) (HCC) 2024     HTN (hypertension) 2024     Lymphedema 2024       LOS (days): 3  Geometric Mean LOS (GMLOS) (days): 3.5  Days to GMLOS:0.3     OBJECTIVE:  Risk of Unplanned Readmission Score: 13.35         Current admission status: Inpatient   Preferred Pharmacy:   PATIENT/FAMILY REPORTS NO PREFERRED PHARMACY  No address on file      Primary Care Provider: Simba Jameson MD    Primary Insurance: [employer] [facility] RE[employer]  Secondary Insurance:     DISCHARGE DETAILS:           [facility] aware of transport time for patient                                                         Transported by (Company and Unit #): [facility]  ETA of Transport (Date): 25  ETA of Transport (Time): 1800

## 2025-04-08 NOTE — CASE MANAGEMENT
Case Management Discharge Planning Note    Patient name Fang Victor  Location /-01 MRN [id number]  : 1935 Date 2025       Current Admission Date: 2025  Current Admission Diagnosis:Acute respiratory failure with hypoxia (HCC)   Patient Active Problem List    Diagnosis Date Noted Date Diagnosed    Acute respiratory failure with hypoxia (HCC) 2025     Pulmonary fibrosis (HCC) 2025     Pulmonary edema 2025     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 2025     Stage 3b chronic kidney disease (HCC) 2025     Acute kidney injury superimposed on chronic kidney disease  (HCC) 2024     DVT (deep venous thrombosis) (HCC) 2024     ILD (interstitial lung disease) (HCC) 2024     HTN (hypertension) 2024     Lymphedema 2024       LOS (days): 3  Geometric Mean LOS (GMLOS) (days): 3.5  Days to GMLOS:0.6     OBJECTIVE:  Risk of Unplanned Readmission Score: 11.97         Current admission status: Inpatient   Preferred Pharmacy:   PATIENT/FAMILY REPORTS NO PREFERRED PHARMACY  No address on file      Primary Care Provider: Simba Jameson MD    Primary Insurance: [employer] [facility] [employer]  Secondary Insurance:     DISCHARGE DETAILS:        KEILA submitted auth for [facility] STR.    KEILA messaged [facility], in [facility], to explore bed availability.                                                                                               Accepting Facility Name, City & State : [facility]  Receiving Facility/Agency Phone Number: [phone]  Facility/Agency Fax Number: [phone]

## 2025-04-08 NOTE — CONSULTS
Pulmonary Medicine-Consultation  Fang Victor 89 y.o. female MRN: [id number]  Unit/Bed#: -01 Encounter: [id number]      Assessment:  Acute hypoxic respiratory failure  required 2 LPM with exertion  Suspect mixed etiology from mild pulmonary edema (noted mild GGO's on CT chest, lower extremity edema on admission) vs acute bronchitis (possible viral URI given dry cough on admission for 2 weeks)  Now improving, no hypoxemia at rest  Interstitial lung disease  Unclassified, subpleural reticular changes more at [place], medial portion of RLL  Possible early IPF/NSIP vs chronic HP/sarcoid given upper lobe location  Subacute cough  Suspect URI/acute bronchitis  vs ILD related  Morbid obesity  Chronic diastolic CHF-reported lower extremity edema/swelling more than baseline on admission with elevated BNP, which is trending down    Recommendations/discussion:  Continue to wean FiO2 for SpO2 above 88%  Given the elevated CRP, may start prednisone 40 mg for 5 days then stop, possible ILD flare/acute bronchitis  Consider gentle diuresis, or at least avoid positive fluid balance  Home oxygen evaluation, will reassess as an outpatient  workup for CTD/ILD  Prescribe DuoNeb every 6 as needed for cough/Tessalon Perles on discharge  OP follow up with pulmonary    Discussed with the primary team     Physician Requesting Consult: Maya Lopez MD    Reason for Consult / Principal Problem: ILD/hypoxic respiratory failure    HPI:   89 y.o. female with a h/o HTN, morbid obesity, dyslipidemia CKD 3, DVT/Eliquis.    Pt brought from the SNF/for due to hypoxemia on room air which is new to her.  She is known to have interstitial lung disease/fibrosis.  She was HD stable, initial chest x-ray was concerning for mild pulmonary congestion, CT chest showing stable fibrotic/reticular changes no acute infiltrate/consolidation.  Negative PCT on admission, mildly elevated BNP at 249.  TTE showed EF 75%, normal wall motion normal RV size and  function, with mild AS.  Negative COVID/flu/RSV required 2 LPM supplemental oxygen.  Admitted to [facility], tried diuresis for 2 days 4/5 and 4/6 with Lasix 40 mg daily.  Given IV Solu-Medrol 80 mg once on admission.    On my assessment, patient is sitting in chair.  Reports first Dx of ILD/lung fibrosis 4 years ago when she had a chest x-ray.  No prior Hx of pulmonary disorders or CTD.  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  No exposure to mold or birds.  At baseline able to ambulate with a walker and to be independent in ADLs, moved to this area recently to be close to her sister.  No significant environmental/occupational hazard exposure.    Current issues started approximately 2 weeks ago with dry cough, worse with laying flat, mild rhinorrhea.  Also reported lower extremity swelling that improved with diuretics given this admission.  No dyspnea at rest or with exertion with OT/PT today.  No fever, chills or night sweats.    Inpatient consult to Pulmonology  Consult performed by: Anson Dow MD  Consult ordered by: Maya Lopez MD          Review of Systems  As per hpi, all other systems reviewed and were negative      Studies:    Imaging Studies: I have personally reviewed pertinent films in PACS    CT chest 2/9/2023-subpleural interstitial thickening, mosaic attenuation slightly progressed from 2/2022.    CT chest 11/20/2023-multifocal interstitial thickening/reticulation mild GGO, traction bronchiectasis more at [place].  Also at medial RLL.  Enlarged pulmonary artery    CT chest/4/2025-unchanged/stable fibrotic changes    EKG, Pathology, and Other Studies: I have personally reviewed pertinent films in PACS    Pulmonary Results (PFTs, PSG): None in file    Historical Information   Past Medical History:   Diagnosis Date    Chronic pulmonary embolism (HCC)     COPD (chronic obstructive pulmonary disease) (HCC)     Edema     Hyperlipidemia     Hypertension   "    History reviewed. No pertinent surgical history.  Social History   Social History     Substance and Sexual Activity   Alcohol Use Never     Social History     Substance and Sexual Activity   Drug Use Never     Social History     Tobacco Use   Smoking Status Former   Smokeless Tobacco Never     E-Cigarette/Vaping    E-Cigarette Use Never User      E-Cigarette/Vaping Substances         Family History: History reviewed. No pertinent family history.    Meds/Allergies   all current active meds have been reviewed    Allergies   Allergen Reactions    Codeine Itching    Cephalosporins Rash    Penicillins Rash    Sulfa Antibiotics Rash       Objective   Vitals: Blood pressure 159/63, pulse 60, temperature 98.2 °F (36.8 °C), resp. rate 18, height 4' 7\" (1.397 m), weight 97.6 kg (215 lb 2.7 oz), SpO2 93%.,Body mass index is 50.01 kg/m².    Intake/Output Summary (Last 24 hours) at 4/8/2025 1144  Last data filed at 4/8/2025 1010  Gross per 24 hour   Intake 840 ml   Output 976 ml   Net -136 ml     Invasive Devices       None                   Physical Exam  Body mass index is 50.01 kg/m².   Gen: not in acute distress, obese  Neck/Eyes: supple, JVD 3 cm above clavicular line  Ear: normal appearance, no significant hearing impairment  Nose:  normal nasal mucosa, no drainage  Chest: normal respiratory efforts, clear breath sounds bilaterally  CV: RRR, no murmurs appreciated, LE lymphedema/soft with wrinkle   Abdomen: soft, non tender  Extremities:  No observed deformity   Neuro: AAO X3, no focal motor deficit       Lab Results: I have personally reviewed pertinent lab results.          None    Portions of the record may have been created with voice recognition software.  Occasional wrong word or \"sound a like\" substitutions may have occurred due to the inherent limitations of voice recognition software.  Read the chart carefully and recognize, using context, where substitutions have occurred.    "

## 2025-04-09 LAB
BACTERIA BLD CULT: NORMAL
BACTERIA BLD CULT: NORMAL

## 2025-04-09 NOTE — UTILIZATION REVIEW
NOTIFICATION OF ADMISSION DISCHARGE   This is a Notification of Discharge from [facility]. Please be advised that this patient has been discharge from our facility. Below you will find the admission and discharge date and time including the patient’s disposition.   UTILIZATION REVIEW CONTACT:  Utilization Review Assistants  Network Utilization Review Department  Phone: [phone] x carefully listen to the prompts. All voicemails are confidential.  Email: [email or web address]     ADMISSION INFORMATION  PRESENTATION DATE: 4/4/2025  8:52 AM  OBERVATION ADMISSION DATE: 04/04/2025 1151  INPATIENT ADMISSION DATE: 4/5/25 12:08 PM   DISCHARGE DATE: 4/8/2025  6:31 PM   DISPOSITION:Non [facility] SNF/TCU/SNU    Network Utilization Review Department  ATTENTION: Please call with any questions or concerns to [phone] and carefully listen to the prompts so that you are directed to the right person. All voicemails are confidential.   For Discharge needs, contact Care Management DC Support Team at [phone] opt. 2  Send all requests for admission clinical reviews, approved or denied determinations and any other requests to dedicated fax number below belonging to the campus where the patient is receiving treatment. List of dedicated fax numbers for the Facilities:  FACILITY NAME UR FAX NUMBER   ADMISSION DENIALS (Administrative/Medical Necessity) [phone]   DISCHARGE SUPPORT TEAM ([facility]) [phone]   PARENT CHILD HEALTH (Maternity/NICU/Pediatrics) [phone]   [facility] [phone]   [facility] [phone]   [facility] [phone]   [facility] [phone]   [facility] [phone]   [facility] [phone]   [facility] [phone]   [facility] [phone]    [facility] [phone]   [facility] [phone]   [facility] [phone]   [facility] [phone]

## 2025-08-02 ENCOUNTER — HOSPITAL ENCOUNTER (INPATIENT)
Facility: HOSPITAL | Age: 89
LOS: 2 days | Discharge: HOME HEALTH | DRG: 189 | End: 2025-08-05
Attending: STUDENT IN AN ORGANIZED HEALTH CARE EDUCATION/TRAINING PROGRAM | Admitting: FAMILY MEDICINE
Payer: COMMERCIAL

## 2025-08-02 ENCOUNTER — APPOINTMENT (EMERGENCY)
Dept: CT IMAGING | Facility: HOSPITAL | Age: 89
DRG: 189 | End: 2025-08-02
Payer: COMMERCIAL

## 2025-08-02 PROBLEM — E83.42 HYPOMAGNESEMIA: Status: ACTIVE | Noted: 2025-08-02

## 2025-08-02 PROBLEM — E87.6 HYPOKALEMIA: Status: ACTIVE | Noted: 2025-08-02

## 2025-08-05 PROBLEM — E83.42 HYPOMAGNESEMIA: Status: RESOLVED | Noted: 2025-08-02 | Resolved: 2025-08-05

## 2025-08-05 PROBLEM — E87.6 HYPOKALEMIA: Status: RESOLVED | Noted: 2025-08-02 | Resolved: 2025-08-05

## 2025-08-06 ENCOUNTER — HOME CARE VISIT (OUTPATIENT)
Dept: HOME HEALTH SERVICES | Facility: HOME HEALTHCARE | Age: 89
End: 2025-08-06
Payer: COMMERCIAL

## 2025-08-06 VITALS
TEMPERATURE: 97.3 F | DIASTOLIC BLOOD PRESSURE: 64 MMHG | HEART RATE: 62 BPM | SYSTOLIC BLOOD PRESSURE: 132 MMHG | OXYGEN SATURATION: 98 % | RESPIRATION RATE: 18 BRPM

## 2025-08-06 PROCEDURE — 400013 VN SOC

## 2025-08-06 PROCEDURE — G0299 HHS/HOSPICE OF RN EA 15 MIN: HCPCS

## 2025-08-08 ENCOUNTER — HOME CARE VISIT (OUTPATIENT)
Dept: HOME HEALTH SERVICES | Facility: HOME HEALTHCARE | Age: 89
End: 2025-08-08
Payer: COMMERCIAL

## 2025-08-08 VITALS — HEART RATE: 77 BPM | SYSTOLIC BLOOD PRESSURE: 134 MMHG | DIASTOLIC BLOOD PRESSURE: 72 MMHG | OXYGEN SATURATION: 97 %

## 2025-08-08 PROCEDURE — G0152 HHCP-SERV OF OT,EA 15 MIN: HCPCS

## 2025-08-11 ENCOUNTER — HOME CARE VISIT (OUTPATIENT)
Dept: HOME HEALTH SERVICES | Facility: HOME HEALTHCARE | Age: 89
End: 2025-08-11
Payer: COMMERCIAL

## 2025-08-12 ENCOUNTER — APPOINTMENT (OUTPATIENT)
Dept: LAB | Facility: CLINIC | Age: 89
End: 2025-08-12
Attending: FAMILY MEDICINE
Payer: COMMERCIAL

## 2025-08-12 ENCOUNTER — HOME CARE VISIT (OUTPATIENT)
Dept: HOME HEALTH SERVICES | Facility: HOME HEALTHCARE | Age: 89
End: 2025-08-12
Payer: COMMERCIAL

## 2025-08-13 ENCOUNTER — HOME CARE VISIT (OUTPATIENT)
Dept: HOME HEALTH SERVICES | Facility: HOME HEALTHCARE | Age: 89
End: 2025-08-13
Payer: COMMERCIAL

## 2025-08-14 ENCOUNTER — HOME CARE VISIT (OUTPATIENT)
Dept: HOME HEALTH SERVICES | Facility: HOME HEALTHCARE | Age: 89
End: 2025-08-14
Payer: COMMERCIAL

## 2025-08-18 ENCOUNTER — HOME CARE VISIT (OUTPATIENT)
Dept: HOME HEALTH SERVICES | Facility: HOME HEALTHCARE | Age: 89
End: 2025-08-18
Payer: COMMERCIAL

## 2025-08-18 VITALS
RESPIRATION RATE: 18 BRPM | HEART RATE: 72 BPM | DIASTOLIC BLOOD PRESSURE: 62 MMHG | SYSTOLIC BLOOD PRESSURE: 126 MMHG | OXYGEN SATURATION: 97 % | TEMPERATURE: 98 F

## 2025-08-18 PROCEDURE — G0299 HHS/HOSPICE OF RN EA 15 MIN: HCPCS

## 2025-08-19 ENCOUNTER — HOME CARE VISIT (OUTPATIENT)
Dept: HOME HEALTH SERVICES | Facility: HOME HEALTHCARE | Age: 89
End: 2025-08-19
Payer: COMMERCIAL

## 2025-08-19 VITALS — DIASTOLIC BLOOD PRESSURE: 66 MMHG | OXYGEN SATURATION: 98 % | SYSTOLIC BLOOD PRESSURE: 136 MMHG | HEART RATE: 70 BPM

## 2025-08-19 PROCEDURE — G0151 HHCP-SERV OF PT,EA 15 MIN: HCPCS

## 2025-08-20 ENCOUNTER — HOME CARE VISIT (OUTPATIENT)
Dept: HOME HEALTH SERVICES | Facility: HOME HEALTHCARE | Age: 89
End: 2025-08-20
Payer: COMMERCIAL

## 2025-08-20 VITALS — HEART RATE: 65 BPM | DIASTOLIC BLOOD PRESSURE: 68 MMHG | OXYGEN SATURATION: 96 % | SYSTOLIC BLOOD PRESSURE: 142 MMHG

## 2025-08-20 PROCEDURE — G0152 HHCP-SERV OF OT,EA 15 MIN: HCPCS

## 2025-08-21 ENCOUNTER — HOME CARE VISIT (OUTPATIENT)
Dept: HOME HEALTH SERVICES | Facility: HOME HEALTHCARE | Age: 89
End: 2025-08-21
Payer: COMMERCIAL

## 2025-08-21 VITALS — OXYGEN SATURATION: 95 % | SYSTOLIC BLOOD PRESSURE: 136 MMHG | HEART RATE: 74 BPM | DIASTOLIC BLOOD PRESSURE: 70 MMHG

## 2025-08-21 PROCEDURE — G0151 HHCP-SERV OF PT,EA 15 MIN: HCPCS
